# Patient Record
Sex: FEMALE | Race: WHITE | Employment: OTHER | URBAN - METROPOLITAN AREA
[De-identification: names, ages, dates, MRNs, and addresses within clinical notes are randomized per-mention and may not be internally consistent; named-entity substitution may affect disease eponyms.]

---

## 2016-08-31 LAB — PAP SMEAR, EXTERNAL: NORMAL

## 2017-01-05 ENCOUNTER — TELEPHONE (OUTPATIENT)
Dept: FAMILY MEDICINE CLINIC | Age: 52
End: 2017-01-05

## 2017-01-05 NOTE — TELEPHONE ENCOUNTER
Patient needs a refill for the Lantus 100 unit/ml injection vial. The pharmacy informed the patient that she needs to have a PA before they can fill the prescription. Patient also wants the medication sent to the [facility]. There phone number is [phone]. Please call when prescription has been approved. Her last fill was on 11- and her last OV was 11-16-17.

## 2017-01-17 ENCOUNTER — TELEPHONE (OUTPATIENT)
Dept: FAMILY MEDICINE CLINIC | Age: 52
End: 2017-01-17

## 2017-02-08 ENCOUNTER — TELEPHONE (OUTPATIENT)
Dept: FAMILY MEDICINE CLINIC | Age: 52
End: 2017-02-08

## 2017-02-13 ENCOUNTER — OFFICE VISIT (OUTPATIENT)
Dept: FAMILY MEDICINE CLINIC | Age: 52
End: 2017-02-13

## 2017-02-13 VITALS
OXYGEN SATURATION: 99 % | DIASTOLIC BLOOD PRESSURE: 60 MMHG | TEMPERATURE: 98.3 F | HEART RATE: 62 BPM | SYSTOLIC BLOOD PRESSURE: 124 MMHG | WEIGHT: 173 LBS | BODY MASS INDEX: 31.64 KG/M2

## 2017-02-13 DIAGNOSIS — J45.20 MILD INTERMITTENT ASTHMA WITHOUT COMPLICATION: ICD-10-CM

## 2017-02-13 DIAGNOSIS — E78.00 PURE HYPERCHOLESTEROLEMIA: ICD-10-CM

## 2017-02-13 DIAGNOSIS — I25.10 CORONARY ARTERY DISEASE INVOLVING NATIVE CORONARY ARTERY OF NATIVE HEART WITHOUT ANGINA PECTORIS: ICD-10-CM

## 2017-02-13 DIAGNOSIS — Z12.11 COLON CANCER SCREENING: ICD-10-CM

## 2017-02-13 DIAGNOSIS — F32.89 OTHER DEPRESSION: ICD-10-CM

## 2017-02-13 DIAGNOSIS — I10 ESSENTIAL HYPERTENSION: ICD-10-CM

## 2017-02-13 PROBLEM — E66.9 DIABETES MELLITUS TYPE 2 IN OBESE (HCC): Status: ACTIVE | Noted: 2017-01-31

## 2017-02-13 PROBLEM — E11.69 DIABETES MELLITUS TYPE 2 IN OBESE (HCC): Status: ACTIVE | Noted: 2017-01-31

## 2017-02-13 LAB
BILIRUBIN, POC: ABNORMAL
BLOOD URINE, POC: ABNORMAL
CLARITY, POC: CLEAR
COLOR, POC: YELLOW
GLUCOSE BLD-MCNC: 101 MG/DL
GLUCOSE URINE, POC: ABNORMAL
KETONES, POC: ABNORMAL
LEUKOCYTE EST, POC: ABNORMAL
NITRITE, POC: ABNORMAL
PH, POC: 7
PROTEIN, POC: ABNORMAL
SPECIFIC GRAVITY, POC: 1.02
UROBILINOGEN, POC: 1

## 2017-02-13 PROCEDURE — 81002 URINALYSIS NONAUTO W/O SCOPE: CPT | Performed by: FAMILY MEDICINE

## 2017-02-13 PROCEDURE — 99214 OFFICE O/P EST MOD 30 MIN: CPT | Performed by: FAMILY MEDICINE

## 2017-02-13 PROCEDURE — 82962 GLUCOSE BLOOD TEST: CPT | Performed by: FAMILY MEDICINE

## 2017-02-13 RX ORDER — INSULIN GLARGINE 100 [IU]/ML
24 INJECTION, SOLUTION SUBCUTANEOUS NIGHTLY
Qty: 1 VIAL | Refills: 2 | Status: CANCELLED | OUTPATIENT
Start: 2017-02-13

## 2017-02-13 RX ORDER — SYRINGE AND NEEDLE,INSULIN,1ML 31 GX5/16"
1 SYRINGE, EMPTY DISPOSABLE MISCELLANEOUS 2 TIMES DAILY
Qty: 100 EACH | Refills: 2 | Status: SHIPPED | OUTPATIENT
Start: 2017-02-13 | End: 2017-06-14 | Stop reason: SDUPTHER

## 2017-02-13 RX ORDER — FLUOXETINE HYDROCHLORIDE 20 MG/1
20 CAPSULE ORAL DAILY
Qty: 30 CAPSULE | Refills: 2 | Status: SHIPPED | OUTPATIENT
Start: 2017-02-13 | End: 2017-08-13 | Stop reason: SDUPTHER

## 2017-02-13 RX ORDER — NITROGLYCERIN 0.3 MG/1
TABLET SUBLINGUAL
COMMUNITY

## 2017-02-13 ASSESSMENT — ENCOUNTER SYMPTOMS
CHEST TIGHTNESS: 0
SHORTNESS OF BREATH: 0
BLOOD IN STOOL: 0
COUGH: 0
ABDOMINAL PAIN: 0

## 2017-03-16 ENCOUNTER — OFFICE VISIT (OUTPATIENT)
Dept: FAMILY MEDICINE CLINIC | Age: 52
End: 2017-03-16

## 2017-03-16 VITALS
DIASTOLIC BLOOD PRESSURE: 80 MMHG | HEIGHT: 62 IN | SYSTOLIC BLOOD PRESSURE: 122 MMHG | OXYGEN SATURATION: 99 % | HEART RATE: 58 BPM | WEIGHT: 176.4 LBS | RESPIRATION RATE: 13 BRPM | BODY MASS INDEX: 32.46 KG/M2

## 2017-03-16 DIAGNOSIS — I10 ESSENTIAL HYPERTENSION: ICD-10-CM

## 2017-03-16 DIAGNOSIS — Z13.9 SCREENING: ICD-10-CM

## 2017-03-16 DIAGNOSIS — M94.0 COSTOCHONDRITIS: Primary | ICD-10-CM

## 2017-03-16 DIAGNOSIS — T14.8XXA MUSCLE STRAIN: ICD-10-CM

## 2017-03-16 PROCEDURE — 99213 OFFICE O/P EST LOW 20 MIN: CPT | Performed by: CLINICAL NURSE SPECIALIST

## 2017-03-16 ASSESSMENT — ENCOUNTER SYMPTOMS
SHORTNESS OF BREATH: 0
CHEST TIGHTNESS: 0

## 2017-03-19 ASSESSMENT — ENCOUNTER SYMPTOMS
CHANGE IN BOWEL HABIT: 0
VISUAL CHANGE: 0
DIARRHEA: 0
ABDOMINAL PAIN: 0
VOMITING: 0
SORE THROAT: 0
NAUSEA: 0

## 2017-03-23 DIAGNOSIS — I10 ESSENTIAL HYPERTENSION: ICD-10-CM

## 2017-03-23 DIAGNOSIS — E78.00 PURE HYPERCHOLESTEROLEMIA: ICD-10-CM

## 2017-03-23 DIAGNOSIS — J45.20 MILD INTERMITTENT ASTHMA WITHOUT COMPLICATION: ICD-10-CM

## 2017-03-23 LAB
A/G RATIO: 1.7 (ref 1.1–2.2)
ALBUMIN SERPL-MCNC: 4.3 G/DL (ref 3.4–5)
ALP BLD-CCNC: 98 U/L (ref 40–129)
ALT SERPL-CCNC: 17 U/L (ref 10–40)
ANION GAP SERPL CALCULATED.3IONS-SCNC: 12 MMOL/L (ref 3–16)
AST SERPL-CCNC: 16 U/L (ref 15–37)
BASOPHILS ABSOLUTE: 0.1 K/UL (ref 0–0.2)
BASOPHILS RELATIVE PERCENT: 1.1 %
BILIRUB SERPL-MCNC: 0.3 MG/DL (ref 0–1)
BUN BLDV-MCNC: 14 MG/DL (ref 7–20)
CALCIUM SERPL-MCNC: 9.2 MG/DL (ref 8.3–10.6)
CHLORIDE BLD-SCNC: 101 MMOL/L (ref 99–110)
CHOLESTEROL, TOTAL: 174 MG/DL (ref 0–199)
CO2: 27 MMOL/L (ref 21–32)
CREAT SERPL-MCNC: 0.7 MG/DL (ref 0.6–1.1)
EOSINOPHILS ABSOLUTE: 0.3 K/UL (ref 0–0.6)
EOSINOPHILS RELATIVE PERCENT: 4.8 %
GFR AFRICAN AMERICAN: >60
GFR NON-AFRICAN AMERICAN: >60
GLOBULIN: 2.6 G/DL
GLUCOSE BLD-MCNC: 176 MG/DL (ref 70–99)
HCT VFR BLD CALC: 38.1 % (ref 36–48)
HDLC SERPL-MCNC: 87 MG/DL (ref 40–60)
HEMOGLOBIN: 12.6 G/DL (ref 12–16)
LDL CHOLESTEROL CALCULATED: 77 MG/DL
LYMPHOCYTES ABSOLUTE: 1.3 K/UL (ref 1–5.1)
LYMPHOCYTES RELATIVE PERCENT: 18.9 %
MCH RBC QN AUTO: 29.4 PG (ref 26–34)
MCHC RBC AUTO-ENTMCNC: 33.1 G/DL (ref 31–36)
MCV RBC AUTO: 88.9 FL (ref 80–100)
MONOCYTES ABSOLUTE: 0.5 K/UL (ref 0–1.3)
MONOCYTES RELATIVE PERCENT: 7.3 %
NEUTROPHILS ABSOLUTE: 4.6 K/UL (ref 1.7–7.7)
NEUTROPHILS RELATIVE PERCENT: 67.9 %
PDW BLD-RTO: 14.2 % (ref 12.4–15.4)
PLATELET # BLD: 298 K/UL (ref 135–450)
PMV BLD AUTO: 8.4 FL (ref 5–10.5)
POTASSIUM SERPL-SCNC: 4.6 MMOL/L (ref 3.5–5.1)
RBC # BLD: 4.29 M/UL (ref 4–5.2)
SODIUM BLD-SCNC: 140 MMOL/L (ref 136–145)
TOTAL CK: 50 U/L (ref 26–192)
TOTAL PROTEIN: 6.9 G/DL (ref 6.4–8.2)
TRIGL SERPL-MCNC: 52 MG/DL (ref 0–150)
VLDLC SERPL CALC-MCNC: 10 MG/DL
WBC # BLD: 6.7 K/UL (ref 4–11)

## 2017-03-24 LAB
ESTIMATED AVERAGE GLUCOSE: 205.9 MG/DL
HBA1C MFR BLD: 8.8 %

## 2017-03-29 RX ORDER — INSULIN GLARGINE 100 [IU]/ML
INJECTION, SOLUTION SUBCUTANEOUS
Qty: 5 PEN | Refills: 0 | Status: SHIPPED | OUTPATIENT
Start: 2017-03-29 | End: 2017-06-14 | Stop reason: SDUPTHER

## 2017-06-14 ENCOUNTER — OFFICE VISIT (OUTPATIENT)
Dept: FAMILY MEDICINE CLINIC | Age: 52
End: 2017-06-14

## 2017-06-14 VITALS
WEIGHT: 174.4 LBS | OXYGEN SATURATION: 99 % | DIASTOLIC BLOOD PRESSURE: 68 MMHG | HEART RATE: 62 BPM | TEMPERATURE: 98.1 F | BODY MASS INDEX: 31.9 KG/M2 | SYSTOLIC BLOOD PRESSURE: 126 MMHG

## 2017-06-14 DIAGNOSIS — E78.00 PURE HYPERCHOLESTEROLEMIA: ICD-10-CM

## 2017-06-14 DIAGNOSIS — F32.89 OTHER DEPRESSION: ICD-10-CM

## 2017-06-14 DIAGNOSIS — I25.10 CORONARY ARTERY DISEASE INVOLVING NATIVE CORONARY ARTERY OF NATIVE HEART WITHOUT ANGINA PECTORIS: ICD-10-CM

## 2017-06-14 DIAGNOSIS — I10 ESSENTIAL HYPERTENSION: ICD-10-CM

## 2017-06-14 DIAGNOSIS — E10.69 TYPE 1 DIABETES MELLITUS WITH OTHER SPECIFIED COMPLICATION (HCC): Primary | ICD-10-CM

## 2017-06-14 LAB
BILIRUBIN, POC: NEGATIVE
BLOOD URINE, POC: NEGATIVE
CLARITY, POC: ABNORMAL
COLOR, POC: YELLOW
GLUCOSE BLD-MCNC: 124 MG/DL
GLUCOSE URINE, POC: NEGATIVE
KETONES, POC: NEGATIVE
LEUKOCYTE EST, POC: ABNORMAL
NITRITE, POC: NEGATIVE
PH, POC: 6.5
PROTEIN, POC: 30
SPECIFIC GRAVITY, POC: 1.02
UROBILINOGEN, POC: 0.2

## 2017-06-14 PROCEDURE — 99214 OFFICE O/P EST MOD 30 MIN: CPT | Performed by: FAMILY MEDICINE

## 2017-06-14 PROCEDURE — 81002 URINALYSIS NONAUTO W/O SCOPE: CPT | Performed by: FAMILY MEDICINE

## 2017-06-14 PROCEDURE — 82962 GLUCOSE BLOOD TEST: CPT | Performed by: FAMILY MEDICINE

## 2017-06-14 RX ORDER — FLUOXETINE HYDROCHLORIDE 20 MG/1
20 CAPSULE ORAL DAILY
Qty: 30 CAPSULE | Refills: 2 | Status: CANCELLED | OUTPATIENT
Start: 2017-06-14

## 2017-06-14 RX ORDER — SYRINGE AND NEEDLE,INSULIN,1ML 31 GX5/16"
1 SYRINGE, EMPTY DISPOSABLE MISCELLANEOUS 2 TIMES DAILY
Qty: 100 EACH | Refills: 2 | Status: SHIPPED | OUTPATIENT
Start: 2017-06-14 | End: 2017-12-18 | Stop reason: SDUPTHER

## 2017-06-15 LAB
A/G RATIO: 1.6 (ref 1.1–2.2)
ALBUMIN SERPL-MCNC: 4.4 G/DL (ref 3.4–5)
ALP BLD-CCNC: 99 U/L (ref 40–129)
ALT SERPL-CCNC: 18 U/L (ref 10–40)
ANION GAP SERPL CALCULATED.3IONS-SCNC: 15 MMOL/L (ref 3–16)
AST SERPL-CCNC: 18 U/L (ref 15–37)
BASOPHILS ABSOLUTE: 0.1 K/UL (ref 0–0.2)
BASOPHILS RELATIVE PERCENT: 1 %
BILIRUB SERPL-MCNC: 0.6 MG/DL (ref 0–1)
BUN BLDV-MCNC: 10 MG/DL (ref 7–20)
CALCIUM SERPL-MCNC: 9.5 MG/DL (ref 8.3–10.6)
CHLORIDE BLD-SCNC: 100 MMOL/L (ref 99–110)
CHOLESTEROL, TOTAL: 169 MG/DL (ref 0–199)
CO2: 26 MMOL/L (ref 21–32)
CREAT SERPL-MCNC: 0.6 MG/DL (ref 0.6–1.1)
EOSINOPHILS ABSOLUTE: 0.3 K/UL (ref 0–0.6)
EOSINOPHILS RELATIVE PERCENT: 4.2 %
ESTIMATED AVERAGE GLUCOSE: 191.5 MG/DL
GFR AFRICAN AMERICAN: >60
GFR NON-AFRICAN AMERICAN: >60
GLOBULIN: 2.8 G/DL
GLUCOSE BLD-MCNC: 105 MG/DL (ref 70–99)
HBA1C MFR BLD: 8.3 %
HCT VFR BLD CALC: 39.1 % (ref 36–48)
HDLC SERPL-MCNC: 97 MG/DL (ref 40–60)
HEMOGLOBIN: 12.7 G/DL (ref 12–16)
HEPATITIS C ANTIBODY INTERPRETATION: NORMAL
LDL CHOLESTEROL CALCULATED: 59 MG/DL
LYMPHOCYTES ABSOLUTE: 1.5 K/UL (ref 1–5.1)
LYMPHOCYTES RELATIVE PERCENT: 22.4 %
MCH RBC QN AUTO: 30.2 PG (ref 26–34)
MCHC RBC AUTO-ENTMCNC: 32.4 G/DL (ref 31–36)
MCV RBC AUTO: 93.2 FL (ref 80–100)
MONOCYTES ABSOLUTE: 0.5 K/UL (ref 0–1.3)
MONOCYTES RELATIVE PERCENT: 7.8 %
NEUTROPHILS ABSOLUTE: 4.4 K/UL (ref 1.7–7.7)
NEUTROPHILS RELATIVE PERCENT: 64.6 %
PDW BLD-RTO: 13.4 % (ref 12.4–15.4)
PLATELET # BLD: 270 K/UL (ref 135–450)
PMV BLD AUTO: 8.5 FL (ref 5–10.5)
POTASSIUM SERPL-SCNC: 4.6 MMOL/L (ref 3.5–5.1)
RBC # BLD: 4.2 M/UL (ref 4–5.2)
SODIUM BLD-SCNC: 141 MMOL/L (ref 136–145)
TOTAL CK: 50 U/L (ref 26–192)
TOTAL PROTEIN: 7.2 G/DL (ref 6.4–8.2)
TRIGL SERPL-MCNC: 64 MG/DL (ref 0–150)
VLDLC SERPL CALC-MCNC: 13 MG/DL
WBC # BLD: 6.9 K/UL (ref 4–11)

## 2017-06-16 DIAGNOSIS — R80.9 PROTEIN IN URINE: Primary | ICD-10-CM

## 2017-06-20 ASSESSMENT — ENCOUNTER SYMPTOMS
SHORTNESS OF BREATH: 0
CHEST TIGHTNESS: 0
COUGH: 0
BLOOD IN STOOL: 0
ABDOMINAL PAIN: 0

## 2017-06-21 ENCOUNTER — TELEPHONE (OUTPATIENT)
Dept: FAMILY MEDICINE CLINIC | Age: 52
End: 2017-06-21

## 2017-06-23 DIAGNOSIS — R80.9 PROTEIN IN URINE: ICD-10-CM

## 2017-06-23 LAB
24HR URINE VOLUME (ML): 900 ML
MICROALBUMIN 24H UR-MCNC: 117 MG/DAY (ref 0–30)
MICROALBUMIN UR-MCNC: 81.3 MCG/MIN (ref 0–20)

## 2017-06-26 ENCOUNTER — NURSE ONLY (OUTPATIENT)
Dept: FAMILY MEDICINE CLINIC | Age: 52
End: 2017-06-26

## 2017-06-26 ENCOUNTER — TELEPHONE (OUTPATIENT)
Dept: FAMILY MEDICINE CLINIC | Age: 52
End: 2017-06-26

## 2017-06-26 DIAGNOSIS — Z12.11 COLON CANCER SCREENING: ICD-10-CM

## 2017-06-26 LAB
CONTROL: POSITIVE
HEMOCCULT STL QL: NEGATIVE

## 2017-06-26 PROCEDURE — 82274 ASSAY TEST FOR BLOOD FECAL: CPT | Performed by: FAMILY MEDICINE

## 2017-06-27 ENCOUNTER — TELEPHONE (OUTPATIENT)
Dept: FAMILY MEDICINE CLINIC | Age: 52
End: 2017-06-27

## 2017-06-27 ENCOUNTER — TELEPHONE (OUTPATIENT)
Dept: SLEEP MEDICINE | Age: 52
End: 2017-06-27

## 2017-06-29 DIAGNOSIS — R80.9 PROTEINURIA: Primary | ICD-10-CM

## 2017-06-29 DIAGNOSIS — R80.9 PROTEINURIA: ICD-10-CM

## 2017-06-30 LAB
24HR URINE VOLUME (ML): 1050 ML
MICROALBUMIN 24H UR-MCNC: 112.4 MG/DAY (ref 0–30)
MICROALBUMIN UR-MCNC: 78 MCG/MIN (ref 0–20)

## 2017-07-12 ENCOUNTER — OFFICE VISIT (OUTPATIENT)
Dept: FAMILY MEDICINE CLINIC | Age: 52
End: 2017-07-12

## 2017-07-12 VITALS
HEART RATE: 86 BPM | OXYGEN SATURATION: 98 % | SYSTOLIC BLOOD PRESSURE: 138 MMHG | DIASTOLIC BLOOD PRESSURE: 76 MMHG | TEMPERATURE: 98.9 F | WEIGHT: 174 LBS | BODY MASS INDEX: 31.83 KG/M2

## 2017-07-12 DIAGNOSIS — K59.00 CONSTIPATION, UNSPECIFIED CONSTIPATION TYPE: ICD-10-CM

## 2017-07-12 DIAGNOSIS — F32.A DEPRESSION, UNSPECIFIED DEPRESSION TYPE: ICD-10-CM

## 2017-07-12 DIAGNOSIS — E10.69 TYPE 1 DIABETES MELLITUS WITH OTHER SPECIFIED COMPLICATION (HCC): Primary | ICD-10-CM

## 2017-07-12 DIAGNOSIS — K58.9 IRRITABLE BOWEL SYNDROME, UNSPECIFIED TYPE: ICD-10-CM

## 2017-07-12 DIAGNOSIS — I10 ESSENTIAL HYPERTENSION: ICD-10-CM

## 2017-07-12 DIAGNOSIS — R10.11 RIGHT UPPER QUADRANT ABDOMINAL PAIN: ICD-10-CM

## 2017-07-12 DIAGNOSIS — R80.9 PROTEINURIA: ICD-10-CM

## 2017-07-12 LAB — GLUCOSE BLD-MCNC: 219 MG/DL

## 2017-07-12 PROCEDURE — 82962 GLUCOSE BLOOD TEST: CPT | Performed by: FAMILY MEDICINE

## 2017-07-12 PROCEDURE — 99214 OFFICE O/P EST MOD 30 MIN: CPT | Performed by: FAMILY MEDICINE

## 2017-07-12 RX ORDER — FLUOXETINE HYDROCHLORIDE 20 MG/1
20 CAPSULE ORAL DAILY
Qty: 30 CAPSULE | Refills: 0 | Status: SHIPPED | OUTPATIENT
Start: 2017-07-12 | End: 2017-09-15 | Stop reason: SDUPTHER

## 2017-07-14 RX ORDER — POLYETHYLENE GLYCOL 3350 17 G/17G
17 POWDER, FOR SOLUTION ORAL DAILY PRN
Qty: 527 G | Refills: 1 | Status: SHIPPED | OUTPATIENT
Start: 2017-07-14 | End: 2017-07-24

## 2017-07-16 ASSESSMENT — ENCOUNTER SYMPTOMS
BLOOD IN STOOL: 0
ABDOMINAL PAIN: 0
CHEST TIGHTNESS: 0
NAUSEA: 0
DIARRHEA: 0
CONSTIPATION: 1
SHORTNESS OF BREATH: 0
COUGH: 0
VOMITING: 0

## 2017-07-18 ENCOUNTER — HOSPITAL ENCOUNTER (OUTPATIENT)
Dept: ULTRASOUND IMAGING | Age: 52
Discharge: HOME | End: 2017-07-18
Attending: FAMILY MEDICINE | Admitting: FAMILY MEDICINE

## 2017-07-18 DIAGNOSIS — R10.11 RIGHT UPPER QUADRANT ABDOMINAL PAIN: ICD-10-CM

## 2017-07-18 DIAGNOSIS — R10.11 RIGHT UPPER QUADRANT PAIN: ICD-10-CM

## 2017-07-24 ENCOUNTER — TELEPHONE (OUTPATIENT)
Dept: FAMILY MEDICINE CLINIC | Age: 52
End: 2017-07-24

## 2017-07-24 ENCOUNTER — OFFICE VISIT (OUTPATIENT)
Dept: FAMILY MEDICINE CLINIC | Age: 52
End: 2017-07-24

## 2017-07-24 VITALS
SYSTOLIC BLOOD PRESSURE: 124 MMHG | BODY MASS INDEX: 31.9 KG/M2 | HEART RATE: 64 BPM | TEMPERATURE: 98.3 F | DIASTOLIC BLOOD PRESSURE: 70 MMHG | OXYGEN SATURATION: 99 % | WEIGHT: 174.4 LBS

## 2017-07-24 DIAGNOSIS — R42 VERTIGO, CONSTANT: ICD-10-CM

## 2017-07-24 DIAGNOSIS — K82.4 GALLBLADDER POLYP: ICD-10-CM

## 2017-07-24 DIAGNOSIS — K59.00 CONSTIPATION, UNSPECIFIED CONSTIPATION TYPE: Primary | ICD-10-CM
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
## 2017-07-24 DIAGNOSIS — R42 VERTIGO: ICD-10-CM

## 2017-07-24 DIAGNOSIS — F32.A DEPRESSION, UNSPECIFIED DEPRESSION TYPE: ICD-10-CM

## 2017-07-24 DIAGNOSIS — S06.0X0A CONCUSSION, WITHOUT LOSS OF CONSCIOUSNESS, INITIAL ENCOUNTER: ICD-10-CM

## 2017-07-24 DIAGNOSIS — S06.0X0A CONCUSSION, WITHOUT LOSS OF CONSCIOUSNESS, INITIAL ENCOUNTER: Primary | ICD-10-CM

## 2017-07-24 PROCEDURE — 99215 OFFICE O/P EST HI 40 MIN: CPT | Performed by: FAMILY MEDICINE

## 2017-07-24 RX ORDER — POLYETHYLENE GLYCOL 3350 17 G/17G
POWDER, FOR SOLUTION ORAL
Refills: 1 | COMMUNITY
Start: 2017-07-14 | End: 2021-11-16

## 2017-07-24 RX ORDER — MECLIZINE HYDROCHLORIDE 25 MG/1
25 TABLET ORAL 2 TIMES DAILY PRN
Qty: 12 TABLET | Refills: 0 | Status: SHIPPED | OUTPATIENT
Start: 2017-07-24 | End: 2017-08-03

## 2017-07-24 ASSESSMENT — ENCOUNTER SYMPTOMS
VOMITING: 0
ABDOMINAL PAIN: 0
COUGH: 0
NAUSEA: 0
CONSTIPATION: 0
BLOOD IN STOOL: 0
CHEST TIGHTNESS: 0
SHORTNESS OF BREATH: 0

## 2017-07-24 ASSESSMENT — PATIENT HEALTH QUESTIONNAIRE - PHQ9
SUM OF ALL RESPONSES TO PHQ9 QUESTIONS 1 & 2: 0
2. FEELING DOWN, DEPRESSED OR HOPELESS: 0
1. LITTLE INTEREST OR PLEASURE IN DOING THINGS: 0
SUM OF ALL RESPONSES TO PHQ QUESTIONS 1-9: 0

## 2017-07-26 ENCOUNTER — TELEPHONE (OUTPATIENT)
Dept: FAMILY MEDICINE CLINIC | Age: 52
End: 2017-07-26

## 2017-07-27 ENCOUNTER — TELEPHONE (OUTPATIENT)
Dept: FAMILY MEDICINE CLINIC | Age: 52
End: 2017-07-27

## 2017-07-28 RX ORDER — PEN NEEDLE, DIABETIC 30 GX3/16"
NEEDLE, DISPOSABLE MISCELLANEOUS
Qty: 100 EACH | Refills: 0 | Status: SHIPPED | OUTPATIENT
Start: 2017-07-28 | End: 2017-10-25 | Stop reason: SDUPTHER

## 2017-08-14 RX ORDER — FLUOXETINE HYDROCHLORIDE 20 MG/1
CAPSULE ORAL
Qty: 30 CAPSULE | Refills: 0 | Status: SHIPPED | OUTPATIENT
Start: 2017-08-14 | End: 2017-09-15

## 2017-08-25 ENCOUNTER — OFFICE VISIT (OUTPATIENT)
Dept: FAMILY MEDICINE CLINIC | Age: 52
End: 2017-08-25

## 2017-08-25 VITALS
OXYGEN SATURATION: 98 % | SYSTOLIC BLOOD PRESSURE: 128 MMHG | DIASTOLIC BLOOD PRESSURE: 80 MMHG | TEMPERATURE: 98.1 F | BODY MASS INDEX: 31.97 KG/M2 | HEART RATE: 68 BPM | WEIGHT: 174.8 LBS

## 2017-08-25 DIAGNOSIS — E66.9 DIABETES MELLITUS TYPE 2 IN OBESE (HCC): ICD-10-CM

## 2017-08-25 DIAGNOSIS — S20.212A CHEST WALL CONTUSION, LEFT, INITIAL ENCOUNTER: ICD-10-CM

## 2017-08-25 DIAGNOSIS — R82.2 BILIRUBIN IN URINE: ICD-10-CM

## 2017-08-25 DIAGNOSIS — S22.42XA CLOSED FRACTURE OF MULTIPLE RIBS OF LEFT SIDE, INITIAL ENCOUNTER: Primary | ICD-10-CM

## 2017-08-25 DIAGNOSIS — E11.69 DIABETES MELLITUS TYPE 2 IN OBESE (HCC): ICD-10-CM

## 2017-08-25 LAB
BACTERIA: ABNORMAL /HPF
BILIRUBIN URINE: ABNORMAL
BILIRUBIN, POC: NORMAL
BLOOD URINE, POC: NEGATIVE
BLOOD, URINE: NEGATIVE
CLARITY, POC: CLEAR
CLARITY: ABNORMAL
COLOR, POC: YELLOW
COLOR: YELLOW
EPITHELIAL CELLS, UA: 13 /HPF (ref 0–5)
GLUCOSE BLD-MCNC: 288 MG/DL
GLUCOSE URINE, POC: 1000
GLUCOSE URINE: >=1000 MG/DL
HYALINE CASTS: 4 /LPF (ref 0–8)
KETONES, POC: NEGATIVE
KETONES, URINE: NEGATIVE MG/DL
LEUKOCYTE EST, POC: NEGATIVE
LEUKOCYTE ESTERASE, URINE: NEGATIVE
MICROSCOPIC EXAMINATION: YES
NITRITE, POC: NEGATIVE
NITRITE, URINE: NEGATIVE
PH UA: 5.5
PH, POC: 5.5
PROTEIN UA: ABNORMAL MG/DL
PROTEIN, POC: NEGATIVE
RBC UA: 2 /HPF (ref 0–4)
SPECIFIC GRAVITY UA: >1.03
SPECIFIC GRAVITY, POC: 1.02
URINE TYPE: ABNORMAL
UROBILINOGEN, POC: 0.2
UROBILINOGEN, URINE: 0.2 E.U./DL
WBC UA: 3 /HPF (ref 0–5)

## 2017-08-25 PROCEDURE — 81003 URINALYSIS AUTO W/O SCOPE: CPT | Performed by: FAMILY MEDICINE

## 2017-08-25 PROCEDURE — 82962 GLUCOSE BLOOD TEST: CPT | Performed by: FAMILY MEDICINE

## 2017-08-25 PROCEDURE — 81002 URINALYSIS NONAUTO W/O SCOPE: CPT | Performed by: FAMILY MEDICINE

## 2017-08-25 PROCEDURE — 99213 OFFICE O/P EST LOW 20 MIN: CPT | Performed by: FAMILY MEDICINE

## 2017-08-25 RX ORDER — HYDROCODONE BITARTRATE AND ACETAMINOPHEN 7.5; 325 MG/1; MG/1
TABLET ORAL
Refills: 0 | COMMUNITY
Start: 2017-08-14 | End: 2018-07-12

## 2017-08-25 RX ORDER — BACLOFEN 10 MG/1
TABLET ORAL
Refills: 0 | COMMUNITY
Start: 2017-08-14 | End: 2021-11-16 | Stop reason: ALTCHOICE

## 2017-08-25 RX ORDER — MELOXICAM 15 MG/1
TABLET ORAL
COMMUNITY
Start: 2017-08-21 | End: 2018-07-12

## 2017-08-28 ENCOUNTER — TELEPHONE (OUTPATIENT)
Dept: FAMILY MEDICINE CLINIC | Age: 52
End: 2017-08-28

## 2017-08-28 DIAGNOSIS — R82.2 BILIRUBIN IN URINE: Primary | ICD-10-CM

## 2017-08-29 DIAGNOSIS — R82.2 BILIRUBIN IN URINE: ICD-10-CM

## 2017-08-29 LAB
A/G RATIO: 1.4 (ref 1.1–2.2)
ALBUMIN SERPL-MCNC: 3.9 G/DL (ref 3.4–5)
ALP BLD-CCNC: 96 U/L (ref 40–129)
ALT SERPL-CCNC: 11 U/L (ref 10–40)
ANION GAP SERPL CALCULATED.3IONS-SCNC: 13 MMOL/L (ref 3–16)
AST SERPL-CCNC: 14 U/L (ref 15–37)
BILIRUB SERPL-MCNC: 0.5 MG/DL (ref 0–1)
BUN BLDV-MCNC: 12 MG/DL (ref 7–20)
CALCIUM SERPL-MCNC: 9.3 MG/DL (ref 8.3–10.6)
CHLORIDE BLD-SCNC: 99 MMOL/L (ref 99–110)
CO2: 25 MMOL/L (ref 21–32)
CREAT SERPL-MCNC: 0.7 MG/DL (ref 0.6–1.1)
GFR AFRICAN AMERICAN: >60
GFR NON-AFRICAN AMERICAN: >60
GLOBULIN: 2.8 G/DL
GLUCOSE BLD-MCNC: 176 MG/DL (ref 70–99)
POTASSIUM SERPL-SCNC: 4.9 MMOL/L (ref 3.5–5.1)
SODIUM BLD-SCNC: 137 MMOL/L (ref 136–145)
TOTAL PROTEIN: 6.7 G/DL (ref 6.4–8.2)

## 2017-08-29 ASSESSMENT — ENCOUNTER SYMPTOMS
CHEST TIGHTNESS: 0
SINUS PRESSURE: 0
COUGH: 0
VOMITING: 0
NAUSEA: 0
CONSTIPATION: 0
ABDOMINAL PAIN: 0
DIARRHEA: 0
BLOOD IN STOOL: 0
SHORTNESS OF BREATH: 0

## 2017-09-15 ENCOUNTER — OFFICE VISIT (OUTPATIENT)
Dept: FAMILY MEDICINE CLINIC | Age: 52
End: 2017-09-15

## 2017-09-15 VITALS
DIASTOLIC BLOOD PRESSURE: 60 MMHG | HEART RATE: 64 BPM | BODY MASS INDEX: 30.87 KG/M2 | WEIGHT: 168.8 LBS | TEMPERATURE: 98.5 F | OXYGEN SATURATION: 98 % | SYSTOLIC BLOOD PRESSURE: 128 MMHG

## 2017-09-15 DIAGNOSIS — F32.A DEPRESSION, UNSPECIFIED DEPRESSION TYPE: ICD-10-CM

## 2017-09-15 DIAGNOSIS — E78.00 PURE HYPERCHOLESTEROLEMIA: ICD-10-CM

## 2017-09-15 DIAGNOSIS — I25.10 CORONARY ARTERY DISEASE INVOLVING NATIVE CORONARY ARTERY OF NATIVE HEART WITHOUT ANGINA PECTORIS: ICD-10-CM

## 2017-09-15 DIAGNOSIS — I10 ESSENTIAL HYPERTENSION: ICD-10-CM

## 2017-09-15 DIAGNOSIS — J45.20 MILD INTERMITTENT ASTHMA WITHOUT COMPLICATION: ICD-10-CM

## 2017-09-15 DIAGNOSIS — Z23 NEED FOR INFLUENZA VACCINATION: ICD-10-CM

## 2017-09-15 LAB
BILIRUBIN, POC: NORMAL
BLOOD URINE, POC: NORMAL
CLARITY, POC: CLEAR
COLOR, POC: YELLOW
GLUCOSE BLD-MCNC: 183 MG/DL
GLUCOSE URINE, POC: NORMAL
KETONES, POC: NORMAL
LEUKOCYTE EST, POC: NORMAL
NITRITE, POC: NORMAL
PH, POC: 6
PROTEIN, POC: NORMAL
SPECIFIC GRAVITY, POC: 1.01
UROBILINOGEN, POC: 0.2

## 2017-09-15 PROCEDURE — 90686 IIV4 VACC NO PRSV 0.5 ML IM: CPT | Performed by: FAMILY MEDICINE

## 2017-09-15 PROCEDURE — 81002 URINALYSIS NONAUTO W/O SCOPE: CPT | Performed by: FAMILY MEDICINE

## 2017-09-15 PROCEDURE — 90471 IMMUNIZATION ADMIN: CPT | Performed by: FAMILY MEDICINE

## 2017-09-15 PROCEDURE — 82962 GLUCOSE BLOOD TEST: CPT | Performed by: FAMILY MEDICINE

## 2017-09-15 PROCEDURE — 99214 OFFICE O/P EST MOD 30 MIN: CPT | Performed by: FAMILY MEDICINE

## 2017-09-15 RX ORDER — SYRINGE AND NEEDLE,INSULIN,1ML 31 GX5/16"
1 SYRINGE, EMPTY DISPOSABLE MISCELLANEOUS 2 TIMES DAILY
Qty: 100 EACH | Refills: 2 | Status: CANCELLED | OUTPATIENT
Start: 2017-09-15

## 2017-09-15 RX ORDER — FLUOXETINE HYDROCHLORIDE 20 MG/1
20 CAPSULE ORAL DAILY
Qty: 30 CAPSULE | Refills: 2 | Status: SHIPPED | OUTPATIENT
Start: 2017-09-15 | End: 2017-12-13 | Stop reason: SDUPTHER

## 2017-09-15 RX ORDER — BLOOD SUGAR DIAGNOSTIC
STRIP MISCELLANEOUS
Qty: 150 EACH | Refills: 1 | Status: CANCELLED | OUTPATIENT
Start: 2017-09-15

## 2017-09-15 RX ORDER — PEN NEEDLE, DIABETIC 30 GX3/16"
NEEDLE, DISPOSABLE MISCELLANEOUS
Qty: 100 EACH | Refills: 0 | Status: CANCELLED | OUTPATIENT
Start: 2017-09-15

## 2017-09-15 RX ORDER — LANCETS 28 GAUGE
1 EACH MISCELLANEOUS 2 TIMES DAILY
Qty: 100 EACH | Refills: 2 | Status: CANCELLED | OUTPATIENT
Start: 2017-09-15

## 2017-09-15 ASSESSMENT — PATIENT HEALTH QUESTIONNAIRE - PHQ9
SUM OF ALL RESPONSES TO PHQ QUESTIONS 1-9: 0
SUM OF ALL RESPONSES TO PHQ9 QUESTIONS 1 & 2: 0
2. FEELING DOWN, DEPRESSED OR HOPELESS: 0
1. LITTLE INTEREST OR PLEASURE IN DOING THINGS: 0

## 2017-09-24 ASSESSMENT — ENCOUNTER SYMPTOMS
COUGH: 0
CHEST TIGHTNESS: 0
BLOOD IN STOOL: 0
ABDOMINAL PAIN: 0
SHORTNESS OF BREATH: 0

## 2017-10-11 RX ORDER — INSULIN GLARGINE 100 [IU]/ML
INJECTION, SOLUTION SUBCUTANEOUS
Qty: 5 PEN | Refills: 0 | Status: SHIPPED | OUTPATIENT
Start: 2017-10-11 | End: 2017-11-07 | Stop reason: SDUPTHER

## 2017-10-11 NOTE — TELEPHONE ENCOUNTER
Felicia Medel last filled 9/15/17 5 pens/o refills  Last OV 9/15/17  Next OV for 3 month med check 12/8/17  Please advise

## 2017-10-27 RX ORDER — PEN NEEDLE, DIABETIC 30 GX3/16"
NEEDLE, DISPOSABLE MISCELLANEOUS
Qty: 100 EACH | Refills: 0 | Status: SHIPPED | OUTPATIENT
Start: 2017-10-27 | End: 2017-12-18 | Stop reason: SDUPTHER

## 2017-12-13 RX ORDER — FLUOXETINE HYDROCHLORIDE 20 MG/1
CAPSULE ORAL
Qty: 14 CAPSULE | Refills: 0 | Status: SHIPPED | OUTPATIENT
Start: 2017-12-13 | End: 2017-12-18 | Stop reason: SDUPTHER

## 2017-12-18 ENCOUNTER — OFFICE VISIT (OUTPATIENT)
Dept: FAMILY MEDICINE CLINIC | Age: 52
End: 2017-12-18

## 2017-12-18 VITALS
DIASTOLIC BLOOD PRESSURE: 68 MMHG | WEIGHT: 171.4 LBS | OXYGEN SATURATION: 98 % | SYSTOLIC BLOOD PRESSURE: 114 MMHG | TEMPERATURE: 98.3 F | BODY MASS INDEX: 31.35 KG/M2 | HEART RATE: 98 BPM

## 2017-12-18 DIAGNOSIS — E10.69 TYPE 1 DIABETES MELLITUS WITH OTHER SPECIFIED COMPLICATION (HCC): Primary | ICD-10-CM

## 2017-12-18 DIAGNOSIS — I10 ESSENTIAL HYPERTENSION: ICD-10-CM

## 2017-12-18 DIAGNOSIS — E78.00 PURE HYPERCHOLESTEROLEMIA: ICD-10-CM

## 2017-12-18 DIAGNOSIS — J45.30 MILD PERSISTENT ASTHMA WITHOUT COMPLICATION: ICD-10-CM

## 2017-12-18 DIAGNOSIS — I25.10 CORONARY ARTERY DISEASE INVOLVING NATIVE CORONARY ARTERY OF NATIVE HEART WITHOUT ANGINA PECTORIS: ICD-10-CM

## 2017-12-18 LAB — GLUCOSE BLD-MCNC: 195 MG/DL

## 2017-12-18 PROCEDURE — G8484 FLU IMMUNIZE NO ADMIN: HCPCS | Performed by: FAMILY MEDICINE

## 2017-12-18 PROCEDURE — 3014F SCREEN MAMMO DOC REV: CPT | Performed by: FAMILY MEDICINE

## 2017-12-18 PROCEDURE — G8598 ASA/ANTIPLAT THER USED: HCPCS | Performed by: FAMILY MEDICINE

## 2017-12-18 PROCEDURE — 3017F COLORECTAL CA SCREEN DOC REV: CPT | Performed by: FAMILY MEDICINE

## 2017-12-18 PROCEDURE — 3045F PR MOST RECENT HEMOGLOBIN A1C LEVEL 7.0-9.0%: CPT | Performed by: FAMILY MEDICINE

## 2017-12-18 PROCEDURE — G8417 CALC BMI ABV UP PARAM F/U: HCPCS | Performed by: FAMILY MEDICINE

## 2017-12-18 PROCEDURE — 82962 GLUCOSE BLOOD TEST: CPT | Performed by: FAMILY MEDICINE

## 2017-12-18 PROCEDURE — 1036F TOBACCO NON-USER: CPT | Performed by: FAMILY MEDICINE

## 2017-12-18 PROCEDURE — 99214 OFFICE O/P EST MOD 30 MIN: CPT | Performed by: FAMILY MEDICINE

## 2017-12-18 PROCEDURE — G8427 DOCREV CUR MEDS BY ELIG CLIN: HCPCS | Performed by: FAMILY MEDICINE

## 2017-12-18 RX ORDER — SYRINGE AND NEEDLE,INSULIN,1ML 31 GX5/16"
1 SYRINGE, EMPTY DISPOSABLE MISCELLANEOUS 2 TIMES DAILY
Qty: 100 EACH | Refills: 2 | Status: SHIPPED | OUTPATIENT
Start: 2017-12-18 | End: 2018-03-27 | Stop reason: SDUPTHER

## 2017-12-18 RX ORDER — PEN NEEDLE, DIABETIC 30 GX3/16"
1 NEEDLE, DISPOSABLE MISCELLANEOUS 4 TIMES DAILY
Qty: 100 EACH | Refills: 0 | Status: SHIPPED | OUTPATIENT
Start: 2017-12-18 | End: 2018-03-07 | Stop reason: SDUPTHER

## 2017-12-18 RX ORDER — FLUOXETINE HYDROCHLORIDE 20 MG/1
20 CAPSULE ORAL DAILY
Qty: 30 CAPSULE | Refills: 2 | Status: SHIPPED | OUTPATIENT
Start: 2017-12-18 | End: 2018-03-27 | Stop reason: SDUPTHER

## 2017-12-18 RX ORDER — BLOOD SUGAR DIAGNOSTIC
1 STRIP MISCELLANEOUS 3 TIMES DAILY
Qty: 150 EACH | Refills: 1 | Status: SHIPPED | OUTPATIENT
Start: 2017-12-18 | End: 2018-03-27 | Stop reason: SDUPTHER

## 2017-12-22 DIAGNOSIS — E10.69 TYPE 1 DIABETES MELLITUS WITH OTHER SPECIFIED COMPLICATION (HCC): ICD-10-CM

## 2017-12-22 DIAGNOSIS — E78.00 PURE HYPERCHOLESTEROLEMIA: ICD-10-CM

## 2017-12-22 LAB
A/G RATIO: 1.7 (ref 1.1–2.2)
ALBUMIN SERPL-MCNC: 4.3 G/DL (ref 3.4–5)
ALP BLD-CCNC: 96 U/L (ref 40–129)
ALT SERPL-CCNC: 13 U/L (ref 10–40)
ANION GAP SERPL CALCULATED.3IONS-SCNC: 12 MMOL/L (ref 3–16)
AST SERPL-CCNC: 15 U/L (ref 15–37)
BASOPHILS ABSOLUTE: 0.1 K/UL (ref 0–0.2)
BASOPHILS RELATIVE PERCENT: 1.1 %
BILIRUB SERPL-MCNC: 0.4 MG/DL (ref 0–1)
BUN BLDV-MCNC: 19 MG/DL (ref 7–20)
CALCIUM SERPL-MCNC: 9.3 MG/DL (ref 8.3–10.6)
CHLORIDE BLD-SCNC: 101 MMOL/L (ref 99–110)
CHOLESTEROL, TOTAL: 168 MG/DL (ref 0–199)
CO2: 29 MMOL/L (ref 21–32)
CREAT SERPL-MCNC: 0.8 MG/DL (ref 0.6–1.1)
EOSINOPHILS ABSOLUTE: 0.3 K/UL (ref 0–0.6)
EOSINOPHILS RELATIVE PERCENT: 4 %
GFR AFRICAN AMERICAN: >60
GFR NON-AFRICAN AMERICAN: >60
GLOBULIN: 2.5 G/DL
GLUCOSE BLD-MCNC: 216 MG/DL (ref 70–99)
HCT VFR BLD CALC: 37.9 % (ref 36–48)
HDLC SERPL-MCNC: 80 MG/DL (ref 40–60)
HEMOGLOBIN: 12.6 G/DL (ref 12–16)
LDL CHOLESTEROL CALCULATED: 77 MG/DL
LYMPHOCYTES ABSOLUTE: 1.5 K/UL (ref 1–5.1)
LYMPHOCYTES RELATIVE PERCENT: 22.6 %
MCH RBC QN AUTO: 30.9 PG (ref 26–34)
MCHC RBC AUTO-ENTMCNC: 33.2 G/DL (ref 31–36)
MCV RBC AUTO: 93.1 FL (ref 80–100)
MONOCYTES ABSOLUTE: 0.5 K/UL (ref 0–1.3)
MONOCYTES RELATIVE PERCENT: 7.2 %
NEUTROPHILS ABSOLUTE: 4.4 K/UL (ref 1.7–7.7)
NEUTROPHILS RELATIVE PERCENT: 65.1 %
PDW BLD-RTO: 13.4 % (ref 12.4–15.4)
PLATELET # BLD: 329 K/UL (ref 135–450)
PMV BLD AUTO: 8.9 FL (ref 5–10.5)
POTASSIUM SERPL-SCNC: 4.9 MMOL/L (ref 3.5–5.1)
RBC # BLD: 4.07 M/UL (ref 4–5.2)
SODIUM BLD-SCNC: 142 MMOL/L (ref 136–145)
TOTAL CK: 68 U/L (ref 26–192)
TOTAL PROTEIN: 6.8 G/DL (ref 6.4–8.2)
TRIGL SERPL-MCNC: 56 MG/DL (ref 0–150)
VLDLC SERPL CALC-MCNC: 11 MG/DL
WBC # BLD: 6.8 K/UL (ref 4–11)

## 2017-12-22 RX ORDER — BLOOD-GLUCOSE METER
1 KIT MISCELLANEOUS 4 TIMES DAILY
Qty: 1 DEVICE | Refills: 0 | Status: SHIPPED | OUTPATIENT
Start: 2017-12-22 | End: 2018-03-27 | Stop reason: SDUPTHER

## 2017-12-22 NOTE — TELEPHONE ENCOUNTER
Pt calling states she is a diabetic and has lost her glucose monitor, pt going to [facility] to get a new one.      Pt states if a Rx: is written then medicaid will pay for it.     [phone] [id number] Isis Blandon to leave dtvm

## 2017-12-23 LAB
ESTIMATED AVERAGE GLUCOSE: 200.1 MG/DL
HBA1C MFR BLD: 8.6 %

## 2017-12-27 ASSESSMENT — ENCOUNTER SYMPTOMS
CHEST TIGHTNESS: 0
BLOOD IN STOOL: 0
WHEEZING: 0
ABDOMINAL PAIN: 0
COUGH: 0
SHORTNESS OF BREATH: 0

## 2017-12-27 NOTE — PROGRESS NOTES
SUBJECTIVE:  Erendira Petty   1965   female   No Known Allergies    Chief Complaint   Patient presents with    Diabetes     3 month med check         Patient Active Problem List    Diagnosis Date Noted    Diabetes mellitus type 2 in obese ([address] 01/31/2017    Angina effort ([address] 11/09/2015    Coronary artery disease involving native coronary artery of native heart without angina pectoris 10/29/2015    Mild intermittent asthma without complication 80/60/2060    Type 1 diabetes mellitus ([address]      IDDM      Diabetic retinopathy ([address]     Asthma     Irritable bowel syndrome     Dysthymia 04/21/2014    HTN (hypertension) 04/21/2014    IDDM (insulin dependent diabetes mellitus) ([address] 04/21/2014    CAD (coronary artery disease) 04/21/2014    Hyperlipidemia 04/21/2014    Dyslipidemia 10/28/2012       HPI   Pt Is here today for fu on HTN, hyperlipidemia, CAD, depression, and diabetes. She has been stable on current mgt. Denies CP,SOB or myalgias. No ha,change in vision or neurologic sx. jeremi sx of depression or anxiety. Sleeping well. Reports ambulatory BS to be good. Has not been adhering to a very good diet. Pt has a hx of CAD and follows up regularly with cardiologist. No recent changes in meds or recommendations. Remote hx of asthma. Has not had any recent exacerbations. Very occasional Alb use. Past Medical History:   Diagnosis Date    Asthma     CAD (coronary artery disease)     Depression     Diabetes ([address]     IDDM    Diabetic retinopathy ([address]     Irritable bowel syndrome      Social History     Social History    Marital status: Single     Spouse name: N/A    Number of children: N/A    Years of education: N/A     Occupational History    Not on file.      Social History Main Topics    Smoking status: Former Smoker     Quit date: 4/21/2002    Smokeless tobacco: Never Used    Alcohol use 0.0 oz/week    Drug use: No    Sexual activity: Not on file     Other Topics Concern    Not on file     Social History Narrative    Lives by herself     Family History   Problem Relation Age of Onset    Depression Mother     Heart Disease Mother     Depression Maternal Grandmother     Heart Disease Maternal Grandmother     Heart Disease Maternal Grandfather     Heart Disease Paternal Aunt        Review of Systems   Constitutional: Negative for activity change, appetite change and unexpected weight change. Respiratory: Negative for cough, chest tightness, shortness of breath and wheezing. Cardiovascular: Negative for chest pain, palpitations and leg swelling. Gastrointestinal: Negative for abdominal pain and blood in stool. Endocrine: Negative for cold intolerance and heat intolerance. Musculoskeletal: Negative for arthralgias and myalgias. Skin: Negative for rash. Neurological: Negative for light-headedness and headaches. Hematological: Negative for adenopathy. Does not bruise/bleed easily. Psychiatric/Behavioral: Negative for dysphoric mood, sleep disturbance and suicidal ideas. The patient is not nervous/anxious. OBJECTIVE:  /68 (Site: Right Arm, Position: Sitting, Cuff Size: Medium Adult)   Pulse 98   Temp 98.3 °F (36.8 °C) (Oral)   Wt 171 lb 6.4 oz (77.7 kg)   SpO2 98%   BMI 31.35 kg/m²   Physical Exam   Constitutional: She is oriented to person, place, and time. She appears well-developed and well-nourished. Eyes: EOM are normal. Pupils are equal, round, and reactive to light. Neck: Normal range of motion. Neck supple. No JVD present. No thyromegaly present. Cardiovascular: Normal rate and regular rhythm. Pulmonary/Chest: Effort normal and breath sounds normal.   Abdominal: Soft. Bowel sounds are normal. There is no tenderness. Musculoskeletal:   Feet-no lesions   Neurological: She is alert and oriented to person, place, and time. No cranial nerve deficit. Skin: No rash noted. Psychiatric: She has a normal mood and affect.  Her behavior is normal. Thought content normal.   Nursing note and vitals reviewed. ASSESSMENT/PLAN:    1. Type 1 diabetes mellitus with other specified complication (HCC)  Continue current mgt  Refill meds  Continue ambulatory BS checks  Labs  Encouraged appr diet mgt  - POCT Glucose  - Comprehensive Metabolic Panel; Future  - Hemoglobin A1C; Future    2. Coronary artery disease involving native coronary artery of native heart without angina pectoris  Continue fu with cardiologist as advised    3. Essential hypertension  Refill meds  Intermittent ambulatory BP checks  labs    4. Pure hypercholesterolemia  Refill Lipitor  appr diet mgt  labs  - CK; Future  - CBC Auto Differential; Future  - Lipid Panel; Future    5.  Mild persistent asthma without complication  Prn Alb    6.depression  Continue Prozac  Orders Placed This Encounter   Procedures    Comprehensive Metabolic Panel     Standing Status:   Future     Number of Occurrences:   1     Standing Expiration Date:   1/7/2019    CK     Standing Status:   Future     Number of Occurrences:   1     Standing Expiration Date:   1/7/2019    CBC Auto Differential     Standing Status:   Future     Number of Occurrences:   1     Standing Expiration Date:   1/7/2019    Lipid Panel     Standing Status:   Future     Number of Occurrences:   1     Standing Expiration Date:   1/7/2019     Order Specific Question:   Is Patient Fasting?/# of Hours     Answer:   10 hrs    Hemoglobin A1C     Standing Status:   Future     Number of Occurrences:   1     Standing Expiration Date:   1/7/2019    POCT Glucose     Current Outpatient Prescriptions   Medication Sig Dispense Refill    insulin glargine (BASAGLAR KWIKPEN) 100 UNIT/ML injection pen Inject 24 Units into the skin nightly INJECT 24 UNITS UNDER THE SKIN EVERY NIGHT 10 pen 0    insulin aspart (NOVOLOG) 100 UNIT/ML injection vial Inject 5 Units into the skin 3 times daily (before meals) 1 vial 1    FLUoxetine (PROZAC) 20 MG capsule Take 1 capsule patient needs the One Touch Ultra Glucometer. Would not come up in epic.  aspirin 81 MG tablet Take 81 mg by mouth daily.  clopidogrel (PLAVIX) 75 MG tablet Take 75 mg by mouth daily. No current facility-administered medications for this visit. Mammogram and pap advised  Return in about 3 months (around 3/18/2018), or if symptoms worsen or fail to improve, for diabetes,HTN,depression.     Car Driver MD

## 2018-01-17 RX ORDER — INSULIN GLARGINE 100 [IU]/ML
INJECTION, SOLUTION SUBCUTANEOUS
Qty: 10 PEN | Refills: 1 | Status: SHIPPED | OUTPATIENT
Start: 2018-01-17 | End: 2018-03-27 | Stop reason: SDUPTHER

## 2018-01-24 ENCOUNTER — TELEPHONE (OUTPATIENT)
Dept: FAMILY MEDICINE CLINIC | Age: 53
End: 2018-01-24

## 2018-01-24 NOTE — TELEPHONE ENCOUNTER
The patient had labs at Dr. Fanta Rivera office and is due there for a med check but her ride cancelled and she cant get there. They wont go over her test results with her. She cant get in there until the end of next month and she wonders if any of the results how anything critical that she needs to know about. They are in [facility]. She would like Dr. Yoli Bass to review them if possible and someone call her from here since we are her PCP.   Told her I will ask Yoli Sneed

## 2018-03-07 RX ORDER — PEN NEEDLE, DIABETIC 30 GX3/16"
NEEDLE, DISPOSABLE MISCELLANEOUS
Qty: 50 EACH | Refills: 0 | Status: SHIPPED | OUTPATIENT
Start: 2018-03-07 | End: 2018-03-27 | Stop reason: SDUPTHER

## 2018-03-27 ENCOUNTER — OFFICE VISIT (OUTPATIENT)
Dept: FAMILY MEDICINE CLINIC | Age: 53
End: 2018-03-27

## 2018-03-27 VITALS
DIASTOLIC BLOOD PRESSURE: 60 MMHG | HEART RATE: 63 BPM | OXYGEN SATURATION: 98 % | WEIGHT: 177.8 LBS | BODY MASS INDEX: 32.52 KG/M2 | SYSTOLIC BLOOD PRESSURE: 122 MMHG | TEMPERATURE: 98.4 F

## 2018-03-27 DIAGNOSIS — I25.10 CORONARY ARTERY DISEASE INVOLVING NATIVE CORONARY ARTERY OF NATIVE HEART WITHOUT ANGINA PECTORIS: ICD-10-CM

## 2018-03-27 DIAGNOSIS — J45.20 MILD INTERMITTENT ASTHMA WITHOUT COMPLICATION: ICD-10-CM

## 2018-03-27 DIAGNOSIS — R80.9 MICROALBUMINURIA: ICD-10-CM

## 2018-03-27 DIAGNOSIS — I10 ESSENTIAL HYPERTENSION: ICD-10-CM

## 2018-03-27 DIAGNOSIS — E78.00 PURE HYPERCHOLESTEROLEMIA: ICD-10-CM

## 2018-03-27 DIAGNOSIS — F32.A DEPRESSION, UNSPECIFIED DEPRESSION TYPE: ICD-10-CM

## 2018-03-27 LAB
BILIRUBIN, POC: NORMAL
BLOOD URINE, POC: NORMAL
CLARITY, POC: CLEAR
COLOR, POC: YELLOW
GLUCOSE BLD-MCNC: 146 MG/DL
GLUCOSE URINE, POC: NORMAL
KETONES, POC: NORMAL
LEUKOCYTE EST, POC: NORMAL
NITRITE, POC: NORMAL
PH, POC: 7
PROTEIN, POC: NORMAL
SPECIFIC GRAVITY, POC: 1.02
UROBILINOGEN, POC: 0.2

## 2018-03-27 PROCEDURE — G8427 DOCREV CUR MEDS BY ELIG CLIN: HCPCS | Performed by: FAMILY MEDICINE

## 2018-03-27 PROCEDURE — 3046F HEMOGLOBIN A1C LEVEL >9.0%: CPT | Performed by: FAMILY MEDICINE

## 2018-03-27 PROCEDURE — G8417 CALC BMI ABV UP PARAM F/U: HCPCS | Performed by: FAMILY MEDICINE

## 2018-03-27 PROCEDURE — 1036F TOBACCO NON-USER: CPT | Performed by: FAMILY MEDICINE

## 2018-03-27 PROCEDURE — 82962 GLUCOSE BLOOD TEST: CPT | Performed by: FAMILY MEDICINE

## 2018-03-27 PROCEDURE — 81002 URINALYSIS NONAUTO W/O SCOPE: CPT | Performed by: FAMILY MEDICINE

## 2018-03-27 PROCEDURE — G8482 FLU IMMUNIZE ORDER/ADMIN: HCPCS | Performed by: FAMILY MEDICINE

## 2018-03-27 PROCEDURE — 3014F SCREEN MAMMO DOC REV: CPT | Performed by: FAMILY MEDICINE

## 2018-03-27 PROCEDURE — 99214 OFFICE O/P EST MOD 30 MIN: CPT | Performed by: FAMILY MEDICINE

## 2018-03-27 PROCEDURE — G8598 ASA/ANTIPLAT THER USED: HCPCS | Performed by: FAMILY MEDICINE

## 2018-03-27 PROCEDURE — 3017F COLORECTAL CA SCREEN DOC REV: CPT | Performed by: FAMILY MEDICINE

## 2018-03-27 RX ORDER — PEN NEEDLE, DIABETIC 30 GX3/16"
1 NEEDLE, DISPOSABLE MISCELLANEOUS 4 TIMES DAILY
Qty: 50 EACH | Refills: 0 | Status: SHIPPED | OUTPATIENT
Start: 2018-03-27 | End: 2018-04-21 | Stop reason: SDUPTHER

## 2018-03-27 RX ORDER — BLOOD SUGAR DIAGNOSTIC
1 STRIP MISCELLANEOUS 3 TIMES DAILY
Qty: 150 EACH | Refills: 1 | Status: SHIPPED | OUTPATIENT
Start: 2018-03-27 | End: 2018-06-25 | Stop reason: SDUPTHER

## 2018-03-27 RX ORDER — FLUOXETINE HYDROCHLORIDE 20 MG/1
20 CAPSULE ORAL DAILY
Qty: 30 CAPSULE | Refills: 2 | Status: SHIPPED | OUTPATIENT
Start: 2018-03-27 | End: 2018-06-25 | Stop reason: SDUPTHER

## 2018-03-27 RX ORDER — BLOOD-GLUCOSE METER
1 KIT MISCELLANEOUS 4 TIMES DAILY
Qty: 1 DEVICE | Refills: 0 | Status: SHIPPED | OUTPATIENT
Start: 2018-03-27 | End: 2021-11-16 | Stop reason: ALTCHOICE

## 2018-03-27 RX ORDER — SYRINGE AND NEEDLE,INSULIN,1ML 31 GX5/16"
1 SYRINGE, EMPTY DISPOSABLE MISCELLANEOUS 2 TIMES DAILY
Qty: 100 EACH | Refills: 2 | Status: SHIPPED | OUTPATIENT
Start: 2018-03-27 | End: 2019-04-15 | Stop reason: SDUPTHER

## 2018-03-27 ASSESSMENT — PATIENT HEALTH QUESTIONNAIRE - PHQ9
SUM OF ALL RESPONSES TO PHQ9 QUESTIONS 1 & 2: 0
1. LITTLE INTEREST OR PLEASURE IN DOING THINGS: 0
2. FEELING DOWN, DEPRESSED OR HOPELESS: 0
SUM OF ALL RESPONSES TO PHQ QUESTIONS 1-9: 0

## 2018-03-28 ASSESSMENT — ENCOUNTER SYMPTOMS
CHEST TIGHTNESS: 0
SHORTNESS OF BREATH: 0
BLOOD IN STOOL: 0
ABDOMINAL PAIN: 0
COUGH: 0

## 2018-03-28 NOTE — PROGRESS NOTES
SUBJECTIVE:  Bal Petty   1965   female   No Known Allergies    Chief Complaint   Patient presents with    Diabetes     3 month check    Anxiety        Patient Active Problem List    Diagnosis Date Noted    Diabetes mellitus type 2 in obese ([address] 01/31/2017    Angina effort ([address] 11/09/2015    CAD S/P percutaneous coronary angioplasty 11/09/2015    Coronary artery disease involving native coronary artery of native heart without angina pectoris 10/29/2015     Overview:   ang mLAD + pLCX st'd diffuse D + dLCX dz  '03  ang dRCAst'd LCA same '05  dRCA 35%ISR LCA same EF 60  5/06  MPI nml EF58  8/07      Mild intermittent asthma without complication 49/79/7176    Type 1 diabetes mellitus ([address]      IDDM      Di[facility] [address]     Asthma     Irritable bowel syndrome     Dysthymia 04/21/2014    HTN (hypertension) 04/21/2014    IDDM (insulin dependent diabetes mellitus) ([facility] 04/21/2014    CAD (coronary artery disease) 04/21/2014    Hyperlipidemia 04/21/2014    Dyslipidemia 10/28/2012       HPI   Pt is here today for fu on type 1 diabetes, HTN, hyperlipidemia, depression, mild/intermittent asthma, and CAD. She is currently being evaluated by nephrologist and has been advised to fu for lab results and further consultation. She has been stable on current medication regimen and reports BS are good at home. She is also followed by cardiology for CAD and no recent changes in tx or new recommendations. No other complaints today. Denies sx of depression or anxiety. Sleeping well. No GI sx. No asthma flare up or need for tx for a while.   Past Medical History:   Diagnosis Date    Asthma     CAD (coronary artery disease)     Depression     Diabetes ([facility]     IDDM    Diabetic retinopathy ([facility]     Irritable bowel syndrome      Social History     Social History    Marital status: Single     Spouse name: N/A    Number of children: N/A    Years of education: N/A     Occupational History    Not tenderness. Musculoskeletal:   Feet-no lesions   Neurological: She is alert and oriented to person, place, and time. Skin: No rash noted. Psychiatric: She has a normal mood and affect. Her behavior is normal. Thought content normal.   Nursing note and vitals reviewed. ASSESSMENT/PLAN:    1. IDDM (insulin dependent diabetes mellitus) ([facility]  continue current mgt  Labs  appr diet mgt and increase physical activity    - POCT Urinalysis no Micro  - POCT Glucose  - CBC Auto Differential; Future  - Hemoglobin A1C; Future    2. Coronary artery disease involving native coronary artery of native heart without angina pectoris  continue fu with cardiologist as advised    3. Pure hypercholesterolemia  Refill Lipitor  Labs  Diet mgt  - Comprehensive Metabolic Panel; Future  - Lipid Panel; Future    4. Depression, unspecified depression type  Continue Prozac    5. Mild intermittent asthma without complication  Prn tx    6. Essential hypertension  Continue Zestril  Intermittent ambulatory BP checks    7.  Microalbuminuria  contineu fu with nephrologist      Orders Placed This Encounter   Procedures    CBC Auto Differential     Standing Status:   Future     Standing Expiration Date:   4/16/2019    Comprehensive Metabolic Panel     Standing Status:   Future     Standing Expiration Date:   4/16/2019    Lipid Panel     Standing Status:   Future     Standing Expiration Date:   4/16/2019     Order Specific Question:   Is Patient Fasting?/# of Hours     Answer:   10 hrs    Hemoglobin A1C     Standing Status:   Future     Standing Expiration Date:   4/16/2019    POCT Urinalysis no Micro    POCT Glucose     Current Outpatient Prescriptions   Medication Sig Dispense Refill    Insulin Pen Needle (PEN NEEDLES) 32G X 4 MM MISC Inject 1 Units into the skin 4 times daily 50 each 0    insulin glargine (BASAGLAR KWIKPEN) 100 UNIT/ML injection pen INJECT 24 UNITS INTO THE SKIN NIGHTLY 10 pen 1    Blood Glucose Monitoring Suppl (FREESTYLE LITE) QUAN Inject 1 Device into the skin 4 times daily 1 Device 0    insulin aspart (NOVOLOG) 100 UNIT/ML injection vial Inject 5 Units into the skin 3 times daily (before meals) 1 vial 1    FLUoxetine (PROZAC) 20 MG capsule Take 1 capsule by mouth daily 30 capsule 2    Insulin Syringe-Needle U-100 (B-D INS SYR ULTRAFINE 1CC/31G) 31G X 5/16\" 1 ML MISC Inject 1 each into the skin 3 times daily 150 each 1    glucose blood VI test strips (ASCENSIA AUTODISC VI;ONE TOUCH ULTRA TEST VI) strip 1 each by In Vitro route 6 times daily The patient tests six times daily. She uses the freestyle strips 180 each 2    COMFORT ASSIST INSULIN SYRINGE 31G X 5/16\" 1 ML MISC Inject 1 each into the skin 2 times daily 100 each 2    polyethylene glycol (GLYCOLAX) powder MIX 17 GRAMS IN LIQUID OF CHOICE AND TK ONCE D PRF CONSTIPATION  1    nitroGLYCERIN (NITROSTAT) 0.3 MG SL tablet       FREESTYLE LANCETS MISC Inject 1 each into the skin 2 times daily 100 each 2    VENTOLIN  (90 BASE) MCG/ACT inhaler INHALE TWO PUFFS BY MOUTH EVERY 6 HOURS AS NEEDED FOR WHEEZING 1 Inhaler 0    atorvastatin (LIPITOR) 80 MG tablet       lisinopril (PRINIVIL;ZESTRIL) 20 MG tablet Take 20 mg by mouth      metoprolol (TOPROL-XL) 50 MG XL tablet Take 50 mg by mouth      amLODIPine (NORVASC) 5 MG tablet Take 5 mg by mouth      Blood Glucose Monitoring Suppl (ONE TOUCH ULTRA MINI) W/DEVICE KIT 1 kit by Does not apply route daily as needed 1 kit 0    Tuberculin-Allergy Syringes (B-D PLASTIPAK SYRINGES ALLERGY) 28G X 1/2\" 1 ML MISC 1 each by Does not apply route daily as needed 50 each 1    Glucose Blood (GLUCOMETER ELITE TEST VI) by In Vitro route. The patient needs the One Touch Ultra Glucometer. Would not come up in epic.  aspirin 81 MG tablet Take 81 mg by mouth daily.  clopidogrel (PLAVIX) 75 MG tablet Take 75 mg by mouth daily.       baclofen (LIORESAL) 10 MG tablet TK 1 T PO TID PRF MUSCLE SPASMS  0   

## 2018-03-29 DIAGNOSIS — E78.00 PURE HYPERCHOLESTEROLEMIA: ICD-10-CM

## 2018-03-29 LAB
A/G RATIO: 1.8 (ref 1.1–2.2)
ALBUMIN SERPL-MCNC: 4.2 G/DL (ref 3.4–5)
ALP BLD-CCNC: 89 U/L (ref 40–129)
ALT SERPL-CCNC: 31 U/L (ref 10–40)
ANION GAP SERPL CALCULATED.3IONS-SCNC: 13 MMOL/L (ref 3–16)
AST SERPL-CCNC: 24 U/L (ref 15–37)
BASOPHILS ABSOLUTE: 0.1 K/UL (ref 0–0.2)
BASOPHILS RELATIVE PERCENT: 1.3 %
BILIRUB SERPL-MCNC: 0.4 MG/DL (ref 0–1)
BUN BLDV-MCNC: 14 MG/DL (ref 7–20)
CALCIUM SERPL-MCNC: 8.9 MG/DL (ref 8.3–10.6)
CHLORIDE BLD-SCNC: 99 MMOL/L (ref 99–110)
CHOLESTEROL, TOTAL: 145 MG/DL (ref 0–199)
CO2: 28 MMOL/L (ref 21–32)
CREAT SERPL-MCNC: 0.7 MG/DL (ref 0.6–1.1)
EOSINOPHILS ABSOLUTE: 0.3 K/UL (ref 0–0.6)
EOSINOPHILS RELATIVE PERCENT: 5.3 %
GFR AFRICAN AMERICAN: >60
GFR NON-AFRICAN AMERICAN: >60
GLOBULIN: 2.4 G/DL
GLUCOSE BLD-MCNC: 156 MG/DL (ref 70–99)
HCT VFR BLD CALC: 37.3 % (ref 36–48)
HDLC SERPL-MCNC: 67 MG/DL (ref 40–60)
HEMOGLOBIN: 12.5 G/DL (ref 12–16)
LDL CHOLESTEROL CALCULATED: 63 MG/DL
LYMPHOCYTES ABSOLUTE: 1.8 K/UL (ref 1–5.1)
LYMPHOCYTES RELATIVE PERCENT: 27 %
MCH RBC QN AUTO: 30 PG (ref 26–34)
MCHC RBC AUTO-ENTMCNC: 33.4 G/DL (ref 31–36)
MCV RBC AUTO: 90 FL (ref 80–100)
MONOCYTES ABSOLUTE: 0.6 K/UL (ref 0–1.3)
MONOCYTES RELATIVE PERCENT: 8.5 %
NEUTROPHILS ABSOLUTE: 3.8 K/UL (ref 1.7–7.7)
NEUTROPHILS RELATIVE PERCENT: 57.9 %
PDW BLD-RTO: 12.7 % (ref 12.4–15.4)
PLATELET # BLD: 317 K/UL (ref 135–450)
PMV BLD AUTO: 8.8 FL (ref 5–10.5)
POTASSIUM SERPL-SCNC: 4.9 MMOL/L (ref 3.5–5.1)
RBC # BLD: 4.15 M/UL (ref 4–5.2)
SODIUM BLD-SCNC: 140 MMOL/L (ref 136–145)
TOTAL PROTEIN: 6.6 G/DL (ref 6.4–8.2)
TRIGL SERPL-MCNC: 73 MG/DL (ref 0–150)
VLDLC SERPL CALC-MCNC: 15 MG/DL
WBC # BLD: 6.6 K/UL (ref 4–11)

## 2018-03-30 LAB
ESTIMATED AVERAGE GLUCOSE: 197.3 MG/DL
HBA1C MFR BLD: 8.5 %

## 2018-04-24 RX ORDER — PEN NEEDLE, DIABETIC 30 GX3/16"
NEEDLE, DISPOSABLE MISCELLANEOUS
Qty: 100 EACH | Refills: 0 | Status: SHIPPED | OUTPATIENT
Start: 2018-04-24 | End: 2022-01-04 | Stop reason: SDUPTHER

## 2018-05-09 ENCOUNTER — TELEPHONE (OUTPATIENT)
Dept: FAMILY MEDICINE CLINIC | Age: 53
End: 2018-05-09

## 2018-05-14 RX ORDER — PEN NEEDLE, DIABETIC 30 GX3/16"
NEEDLE, DISPOSABLE MISCELLANEOUS
Qty: 30 EACH | Refills: 0 | Status: SHIPPED | OUTPATIENT
Start: 2018-05-14 | End: 2018-06-25 | Stop reason: SDUPTHER

## 2018-06-25 ENCOUNTER — OFFICE VISIT (OUTPATIENT)
Dept: FAMILY MEDICINE CLINIC | Age: 53
End: 2018-06-25

## 2018-06-25 VITALS
SYSTOLIC BLOOD PRESSURE: 134 MMHG | HEART RATE: 56 BPM | BODY MASS INDEX: 31.17 KG/M2 | OXYGEN SATURATION: 98 % | WEIGHT: 170.4 LBS | TEMPERATURE: 97.8 F | DIASTOLIC BLOOD PRESSURE: 72 MMHG

## 2018-06-25 DIAGNOSIS — Z12.11 COLON CANCER SCREENING: ICD-10-CM

## 2018-06-25 DIAGNOSIS — E78.00 PURE HYPERCHOLESTEROLEMIA: ICD-10-CM

## 2018-06-25 DIAGNOSIS — R07.89 CHEST WALL PAIN: ICD-10-CM

## 2018-06-25 DIAGNOSIS — I25.10 CORONARY ARTERY DISEASE INVOLVING NATIVE CORONARY ARTERY OF NATIVE HEART WITHOUT ANGINA PECTORIS: ICD-10-CM

## 2018-06-25 DIAGNOSIS — I10 ESSENTIAL HYPERTENSION: ICD-10-CM

## 2018-06-25 DIAGNOSIS — E10.69 TYPE 1 DIABETES MELLITUS WITH OTHER SPECIFIED COMPLICATION (HCC): Primary | ICD-10-CM

## 2018-06-25 DIAGNOSIS — F32.A DEPRESSION, UNSPECIFIED DEPRESSION TYPE: ICD-10-CM

## 2018-06-25 LAB — GLUCOSE BLD-MCNC: 311 MG/DL

## 2018-06-25 PROCEDURE — 81002 URINALYSIS NONAUTO W/O SCOPE: CPT | Performed by: FAMILY MEDICINE

## 2018-06-25 PROCEDURE — 99214 OFFICE O/P EST MOD 30 MIN: CPT | Performed by: FAMILY MEDICINE

## 2018-06-25 PROCEDURE — G8427 DOCREV CUR MEDS BY ELIG CLIN: HCPCS | Performed by: FAMILY MEDICINE

## 2018-06-25 PROCEDURE — 3017F COLORECTAL CA SCREEN DOC REV: CPT | Performed by: FAMILY MEDICINE

## 2018-06-25 PROCEDURE — 3045F PR MOST RECENT HEMOGLOBIN A1C LEVEL 7.0-9.0%: CPT | Performed by: FAMILY MEDICINE

## 2018-06-25 PROCEDURE — G8417 CALC BMI ABV UP PARAM F/U: HCPCS | Performed by: FAMILY MEDICINE

## 2018-06-25 PROCEDURE — 2022F DILAT RTA XM EVC RTNOPTHY: CPT | Performed by: FAMILY MEDICINE

## 2018-06-25 PROCEDURE — 1036F TOBACCO NON-USER: CPT | Performed by: FAMILY MEDICINE

## 2018-06-25 PROCEDURE — 82962 GLUCOSE BLOOD TEST: CPT | Performed by: FAMILY MEDICINE

## 2018-06-25 PROCEDURE — G8598 ASA/ANTIPLAT THER USED: HCPCS | Performed by: FAMILY MEDICINE

## 2018-06-25 RX ORDER — PEN NEEDLE, DIABETIC 30 GX3/16"
NEEDLE, DISPOSABLE MISCELLANEOUS
Qty: 30 EACH | Refills: 0 | Status: SHIPPED | OUTPATIENT
Start: 2018-06-25 | End: 2019-04-15 | Stop reason: SDUPTHER

## 2018-06-25 RX ORDER — ATORVASTATIN CALCIUM 80 MG/1
TABLET, FILM COATED ORAL
COMMUNITY
Start: 2018-06-15 | End: 2018-06-25

## 2018-06-25 RX ORDER — METOPROLOL SUCCINATE 50 MG/1
TABLET, EXTENDED RELEASE ORAL
COMMUNITY
Start: 2018-06-15 | End: 2018-06-25

## 2018-06-25 RX ORDER — BLOOD SUGAR DIAGNOSTIC
1 STRIP MISCELLANEOUS 3 TIMES DAILY
Qty: 150 EACH | Refills: 1 | Status: SHIPPED | OUTPATIENT
Start: 2018-06-25 | End: 2022-03-31 | Stop reason: SDUPTHER

## 2018-06-25 RX ORDER — SYRINGE AND NEEDLE,INSULIN,1ML 31 GX5/16"
1 SYRINGE, EMPTY DISPOSABLE MISCELLANEOUS 2 TIMES DAILY
Qty: 100 EACH | Refills: 2 | Status: CANCELLED | OUTPATIENT
Start: 2018-06-25

## 2018-06-25 RX ORDER — FLUOXETINE HYDROCHLORIDE 20 MG/1
20 CAPSULE ORAL DAILY
Qty: 30 CAPSULE | Refills: 2 | Status: SHIPPED | OUTPATIENT
Start: 2018-06-25 | End: 2018-09-18 | Stop reason: SDUPTHER

## 2018-06-25 RX ORDER — AMLODIPINE BESYLATE 5 MG/1
TABLET ORAL
COMMUNITY
Start: 2018-04-23 | End: 2018-06-25

## 2018-06-25 RX ORDER — BLOOD-GLUCOSE METER
1 KIT MISCELLANEOUS 4 TIMES DAILY
Qty: 1 DEVICE | Refills: 0 | Status: CANCELLED | OUTPATIENT
Start: 2018-06-25

## 2018-06-25 ASSESSMENT — PATIENT HEALTH QUESTIONNAIRE - PHQ9
1. LITTLE INTEREST OR PLEASURE IN DOING THINGS: 0
SUM OF ALL RESPONSES TO PHQ QUESTIONS 1-9: 0
SUM OF ALL RESPONSES TO PHQ9 QUESTIONS 1 & 2: 0
2. FEELING DOWN, DEPRESSED OR HOPELESS: 0

## 2018-06-26 ASSESSMENT — ENCOUNTER SYMPTOMS
ABDOMINAL PAIN: 0
DIARRHEA: 0
COUGH: 0
CONSTIPATION: 0
SHORTNESS OF BREATH: 0
WHEEZING: 0
BLOOD IN STOOL: 0
CHEST TIGHTNESS: 0

## 2018-07-06 DIAGNOSIS — E10.69 TYPE 1 DIABETES MELLITUS WITH OTHER SPECIFIED COMPLICATION (HCC): ICD-10-CM

## 2018-07-06 DIAGNOSIS — E78.00 PURE HYPERCHOLESTEROLEMIA: ICD-10-CM

## 2018-07-06 LAB
A/G RATIO: 2 (ref 1.1–2.2)
ALBUMIN SERPL-MCNC: 4.3 G/DL (ref 3.4–5)
ALP BLD-CCNC: 87 U/L (ref 40–129)
ALT SERPL-CCNC: 19 U/L (ref 10–40)
ANION GAP SERPL CALCULATED.3IONS-SCNC: 10 MMOL/L (ref 3–16)
AST SERPL-CCNC: 16 U/L (ref 15–37)
BASOPHILS ABSOLUTE: 0.1 K/UL (ref 0–0.2)
BASOPHILS RELATIVE PERCENT: 1.2 %
BILIRUB SERPL-MCNC: 0.4 MG/DL (ref 0–1)
BUN BLDV-MCNC: 12 MG/DL (ref 7–20)
CALCIUM SERPL-MCNC: 9.2 MG/DL (ref 8.3–10.6)
CHLORIDE BLD-SCNC: 103 MMOL/L (ref 99–110)
CHOLESTEROL, TOTAL: 136 MG/DL (ref 0–199)
CO2: 27 MMOL/L (ref 21–32)
CREAT SERPL-MCNC: 0.6 MG/DL (ref 0.6–1.1)
EOSINOPHILS ABSOLUTE: 0.4 K/UL (ref 0–0.6)
EOSINOPHILS RELATIVE PERCENT: 6.1 %
GFR AFRICAN AMERICAN: >60
GFR NON-AFRICAN AMERICAN: >60
GLOBULIN: 2.2 G/DL
GLUCOSE BLD-MCNC: 93 MG/DL (ref 70–99)
HCT VFR BLD CALC: 36.5 % (ref 36–48)
HDLC SERPL-MCNC: 66 MG/DL (ref 40–60)
HEMOGLOBIN: 12.5 G/DL (ref 12–16)
LDL CHOLESTEROL CALCULATED: 58 MG/DL
LYMPHOCYTES ABSOLUTE: 1.5 K/UL (ref 1–5.1)
LYMPHOCYTES RELATIVE PERCENT: 25.3 %
MCH RBC QN AUTO: 30.4 PG (ref 26–34)
MCHC RBC AUTO-ENTMCNC: 34.2 G/DL (ref 31–36)
MCV RBC AUTO: 88.9 FL (ref 80–100)
MONOCYTES ABSOLUTE: 0.4 K/UL (ref 0–1.3)
MONOCYTES RELATIVE PERCENT: 7.2 %
NEUTROPHILS ABSOLUTE: 3.5 K/UL (ref 1.7–7.7)
NEUTROPHILS RELATIVE PERCENT: 60.2 %
PDW BLD-RTO: 12.8 % (ref 12.4–15.4)
PLATELET # BLD: 316 K/UL (ref 135–450)
PMV BLD AUTO: 8.8 FL (ref 5–10.5)
POTASSIUM SERPL-SCNC: 4.6 MMOL/L (ref 3.5–5.1)
RBC # BLD: 4.1 M/UL (ref 4–5.2)
SODIUM BLD-SCNC: 140 MMOL/L (ref 136–145)
TOTAL CK: 57 U/L (ref 26–192)
TOTAL PROTEIN: 6.5 G/DL (ref 6.4–8.2)
TRIGL SERPL-MCNC: 61 MG/DL (ref 0–150)
VLDLC SERPL CALC-MCNC: 12 MG/DL
WBC # BLD: 5.9 K/UL (ref 4–11)

## 2018-07-07 LAB
ESTIMATED AVERAGE GLUCOSE: 205.9 MG/DL
HBA1C MFR BLD: 8.8 %

## 2018-07-16 ENCOUNTER — HOSPITAL ENCOUNTER (OUTPATIENT)
Dept: ENDOSCOPY | Age: 53
Discharge: HOME | End: 2018-07-16
Attending: INTERNAL MEDICINE | Admitting: INTERNAL MEDICINE

## 2018-07-16 LAB
GLUCOSE BLD-MCNC: 250 MG/DL (ref 70–99)
GLUCOSE BLD-MCNC: 267 MG/DL (ref 70–99)
PERFORMED ON: ABNORMAL
PERFORMED ON: ABNORMAL

## 2018-07-16 NOTE — ANESTHESIA PRE-OP
(before meals) 6/25/18   Jeremiah Magallanes MD   FLUoxetine (PROZAC) 20 MG capsule Take 1 capsule by mouth daily 6/25/18   Jeremiah Magallanes MD   Insulin Syringe-Needle U-100 (B-D INS SYR ULTRAFINE 1CC/31G) 31G X 5/16\" 1 ML MISC Inject 1 each into the skin 3 times daily 6/25/18   Dimple Varma MD   blood glucose test strips (ASCENSIA AUTODISC VI;ONE TOUCH ULTRA TEST VI) strip 1 each by In Vitro route 6 times daily The patient tests six times daily.   She uses the freestyle strips 6/25/18   Dimple Varma MD   Insulin Pen Needle (PEN NEEDLES) 32G X 4 MM MISC USE ONE NEW NEEDLE DAILY WITH BASAGLAR 4/24/18   Dimple Varma MD   Blood Glucose Monitoring Suppl (FREESTYLE LITE) QUAN Inject 1 Device into the skin 4 times daily 3/27/18   Jeremiah Magallanes MD   COMFORT ASSIST INSULIN SYRINGE 31G X 5/16\" 1 ML MISC Inject 1 each into the skin 2 times daily 3/27/18   Jeremiah Magallanes MD   baclofen (LIORESAL) 10 MG tablet TK 1 T PO TID PRF MUSCLE SPASMS 8/14/17   Historical Provider, MD   polyethylene glycol (GLYCOLAX) powder MIX 17 GRAMS IN LIQUID OF CHOICE AND TK ONCE D PRF CONSTIPATION 7/14/17   Historical Provider, MD   nitroGLYCERIN (NITROSTAT) 0.3 MG SL tablet     Historical Provider, MD   FREESTYLE LANCETS MISC Inject 1 each into the skin 2 times daily 11/16/16   Lucas Varma MD   VENTOLIN  (90 BASE) MCG/ACT inhaler INHALE TWO PUFFS BY MOUTH EVERY 6 HOURS AS NEEDED FOR WHEEZING 10/24/16   Jeremiah Magallanes MD   atorvastatin (LIPITOR) 80 MG tablet  9/24/16   Historical Provider, MD   lisinopril (PRINIVIL;ZESTRIL) 20 MG tablet Take 20 mg by mouth    Historical Provider, MD   metoprolol (TOPROL-XL) 50 MG XL tablet Take 50 mg by mouth 12/14/15   Historical Provider, MD   amLODIPine (NORVASC) 5 MG tablet Take 5 mg by mouth 12/14/15   Historical Provider, MD   Blood Glucose Monitoring Suppl (ONE TOUCH ULTRA MINI) W/DEVICE KIT 1 kit by Does not apply route daily as needed 6/6/16   Jeremiah Magallanes MD   Tuberculin-Allergy Syringes (B-D PLASTIPAK (NITROSTAT) 0.3 MG SL tablet       FREESTYLE LANCETS MISC Inject 1 each into the skin 2 times daily 100 each 2    VENTOLIN  (90 BASE) MCG/ACT inhaler INHALE TWO PUFFS BY MOUTH EVERY 6 HOURS AS NEEDED FOR WHEEZING 1 Inhaler 0    atorvastatin (LIPITOR) 80 MG tablet       lisinopril (PRINIVIL;ZESTRIL) 20 MG tablet Take 20 mg by mouth      metoprolol (TOPROL-XL) 50 MG XL tablet Take 50 mg by mouth      amLODIPine (NORVASC) 5 MG tablet Take 5 mg by mouth      Blood Glucose Monitoring Suppl (ONE TOUCH ULTRA MINI) W/DEVICE KIT 1 kit by Does not apply route daily as needed 1 kit 0    Tuberculin-Allergy Syringes (B-D PLASTIPAK SYRINGES ALLERGY) 28G X 1/2\" 1 ML MISC 1 each by Does not apply route daily as needed 50 each 1    Insulin NPH Isophane & Regular (HUMULIN 70/30 PEN) (70-30) 100 UNIT/ML injection pen Inject 25 Units into the skin      Glucose Blood (GLUCOMETER ELITE TEST VI) by In Vitro route. The patient needs the One Touch Ultra Glucometer. Would not come up in epic.  aspirin 81 MG tablet Take 81 mg by mouth daily.  clopidogrel (PLAVIX) 75 MG tablet Take 75 mg by mouth daily. No current facility-administered medications for this encounter. Vital Signs  (Current) There were no vitals filed for this visit.   (for past 48 hrs)  No Data Recorded  (last three values)   BP Readings from Last 3 Encounters:   06/25/18 134/72   03/27/18 122/60   12/18/17 114/68       CBC  Lab Results   Component Value Date    WBC 5.9 07/06/2018    RBC 4.10 07/06/2018    HGB 12.5 07/06/2018    HCT 36.5 07/06/2018    MCV 88.9 07/06/2018    RDW 12.8 07/06/2018     07/06/2018       CMP    Lab Results   Component Value Date     07/06/2018    K 4.6 07/06/2018     07/06/2018    CO2 27 07/06/2018    BUN 12 07/06/2018    CREATININE 0.6 07/06/2018    GFRAA >60 07/06/2018    AGRATIO 2.0 07/06/2018    LABGLOM >60 07/06/2018    GLUCOSE 93 07/06/2018    PROT 6.5 07/06/2018    CALCIUM 9.2 07/06/2018    BILITOT 0.4 07/06/2018    ALKPHOS 87 07/06/2018    AST 16 07/06/2018    ALT 19 07/06/2018       BMP    Lab Results   Component Value Date     07/06/2018    K 4.6 07/06/2018     07/06/2018    CO2 27 07/06/2018    BUN 12 07/06/2018    CREATININE 0.6 07/06/2018    CALCIUM 9.2 07/06/2018    GFRAA >60 07/06/2018    LABGLOM >60 07/06/2018    GLUCOSE 93 07/06/2018       Coags   No results found for: PROTIME, INR, APTT    HCG (If Applicable) No results found for: PREGTESTUR, PREGSERUM, HCG, HCGQUANT     ABGs  No results found for: PHART, PO2ART, KOS8PEP, XLB9TKO, BEART, Z8XLSJKO     Type & Screen (If Applicable)  No results found for: LABABO, LABRH      POCGlucose  No results for input(s): GLUCOSE in the last 72 hours. NPO Status  > 8 hours                       BMI  There is no height or weight on file to calculate BMI. Estimated body mass index is 31.09 kg/m² as calculated from the following:    Height as of 7/12/18: 5' 2\" (1.575 m). Weight as of 7/12/18: 170 lb (77.1 kg). Additional Testing (Echo, Stress, ECG, PFTs, etc)        Anesthesia Evaluation    Airway: Mallampati: II  TM distance: >3 FB   Neck ROM: full  Mouth opening: > = 3 FB Dental:          Pulmonary:   (+) asthma:                            Cardiovascular:    (+) hypertension:, angina:, CAD:,         Rhythm: regular  Rate: normal                    Neuro/Psych:   (+) psychiatric history:            GI/Hepatic/Renal:             Endo/Other:    (+) Diabetes, . Abdominal:           Vascular:                                        Anesthesia Plan      MAC     ASA 2       Induction: intravenous. Anesthetic plan and risks discussed with patient. Plan discussed with CRNA. DOS STAFF ADDENDUM:    Pt seen and examined, chart reviewed (including anesthesia, drug and allergy history). No interval changes to history and physical examination.   Anesthetic plan, risks, benefits, alternatives,

## 2018-07-16 NOTE — ANESTHESIA POST-OP
[address] [facility] Anesthesiology  Post-Anesthesia Note       Name:  Dawit Renteria                                         Age:  48 y.o. MRN:  [id number]     Last Vitals & Oxygen Saturation: [facility] 08/26/2016   No data found. Level of consciousness:  Awake, alert    Respiratory: Respirations easy, no distress. Stable. Cardiovascular: Hemodynamically stable. Hydration: Adequate. PONV: Adequately managed. Post-op pain: Adequately controlled. Post-op assessment: Tolerated anesthetic well without complication. Complications:  None.     Shannan Spain MD  July 16, 2018   11:30 AM

## 2018-08-14 RX ORDER — PEN NEEDLE, DIABETIC 30 GX3/16"
NEEDLE, DISPOSABLE MISCELLANEOUS
Qty: 100 EACH | Refills: 0 | Status: SHIPPED | OUTPATIENT
Start: 2018-08-14 | End: 2018-10-08 | Stop reason: SDUPTHER

## 2018-08-14 NOTE — TELEPHONE ENCOUNTER
Medication:   Requested Prescriptions     Pending Prescriptions Disp Refills    Insulin Pen Needle (PEN NEEDLES) 32G X 4 MM MISC [Pharmacy Med Name: Delmer Rao PEN NEEDLES 32G 4MM]  0     Sig: USE 1 NEEDLE DAILY WITH BASAGLAR     Last Filled:  6/25/2018  30/0    Last appt: 6/25/2018   Next appt: 9/24/2018    Last OARRS: No flowsheet data found.    Please advise

## 2018-08-27 ENCOUNTER — OFFICE VISIT (OUTPATIENT)
Dept: FAMILY MEDICINE CLINIC | Age: 53
End: 2018-08-27

## 2018-08-27 VITALS
BODY MASS INDEX: 31.28 KG/M2 | WEIGHT: 171 LBS | OXYGEN SATURATION: 99 % | HEART RATE: 61 BPM | SYSTOLIC BLOOD PRESSURE: 130 MMHG | DIASTOLIC BLOOD PRESSURE: 76 MMHG | TEMPERATURE: 98.2 F

## 2018-08-27 DIAGNOSIS — E11.69 DIABETES MELLITUS TYPE 2 IN OBESE (HCC): ICD-10-CM

## 2018-08-27 DIAGNOSIS — M79.641 RIGHT HAND PAIN: Primary | ICD-10-CM

## 2018-08-27 DIAGNOSIS — E66.9 DIABETES MELLITUS TYPE 2 IN OBESE (HCC): ICD-10-CM

## 2018-08-27 LAB — GLUCOSE BLD-MCNC: 162 MG/DL

## 2018-08-27 PROCEDURE — 82962 GLUCOSE BLOOD TEST: CPT | Performed by: FAMILY MEDICINE

## 2018-08-27 PROCEDURE — G8417 CALC BMI ABV UP PARAM F/U: HCPCS | Performed by: FAMILY MEDICINE

## 2018-08-27 PROCEDURE — 2022F DILAT RTA XM EVC RTNOPTHY: CPT | Performed by: FAMILY MEDICINE

## 2018-08-27 PROCEDURE — 3045F PR MOST RECENT HEMOGLOBIN A1C LEVEL 7.0-9.0%: CPT | Performed by: FAMILY MEDICINE

## 2018-08-27 PROCEDURE — 99213 OFFICE O/P EST LOW 20 MIN: CPT | Performed by: FAMILY MEDICINE

## 2018-08-27 PROCEDURE — 3017F COLORECTAL CA SCREEN DOC REV: CPT | Performed by: FAMILY MEDICINE

## 2018-08-27 PROCEDURE — G8427 DOCREV CUR MEDS BY ELIG CLIN: HCPCS | Performed by: FAMILY MEDICINE

## 2018-08-27 PROCEDURE — G8598 ASA/ANTIPLAT THER USED: HCPCS | Performed by: FAMILY MEDICINE

## 2018-08-27 PROCEDURE — 1036F TOBACCO NON-USER: CPT | Performed by: FAMILY MEDICINE

## 2018-08-27 RX ORDER — NAPROXEN SODIUM 550 MG/1
550 TABLET ORAL 2 TIMES DAILY WITH MEALS
Qty: 30 TABLET | Refills: 0 | Status: SHIPPED | OUTPATIENT
Start: 2018-08-27 | End: 2021-11-16 | Stop reason: ALTCHOICE

## 2018-08-28 ASSESSMENT — ENCOUNTER SYMPTOMS
COUGH: 0
ABDOMINAL PAIN: 0
SHORTNESS OF BREATH: 0

## 2018-08-28 NOTE — PROGRESS NOTES
reviewed. ASSESSMENT/PLAN:    1. Right hand pain  Ice, Anaprox with food prn  - XR HAND RIGHT (2 VIEWS); Future    2. Diabetes mellitus type 2 in obese (HCC)  Continue  Current mgt  - POCT Glucose      Orders Placed This Encounter   Procedures    XR HAND RIGHT (2 VIEWS)     Standing Status:   Future     Standing Expiration Date:   8/27/2019     Order Specific Question:   Reason for exam:     Answer:   pain    POCT Glucose     Current Outpatient Prescriptions   Medication Sig Dispense Refill    naproxen sodium (ANAPROX DS) 550 MG tablet Take 1 tablet by mouth 2 times daily (with meals) 30 tablet 0    Insulin Pen Needle (PEN NEEDLES) 32G X 4 MM MISC USE 1 NEEDLE DAILY WITH BASAGLAR 100 each 0    Insulin Pen Needle (PEN NEEDLES) 32G X 4 MM MISC USE ONE NEW NEEDLE DAILY WITH BASAGLAR 30 each 0    insulin glargine (BASAGLAR KWIKPEN) 100 UNIT/ML injection pen INJECT 24 UNITS INTO THE SKIN NIGHTLY 10 pen 1    insulin aspart (NOVOLOG) 100 UNIT/ML injection vial Inject 5 Units into the skin 3 times daily (before meals) 1 vial 1    FLUoxetine (PROZAC) 20 MG capsule Take 1 capsule by mouth daily 30 capsule 2    Insulin Syringe-Needle U-100 (B-D INS SYR ULTRAFINE 1CC/31G) 31G X 5/16\" 1 ML MISC Inject 1 each into the skin 3 times daily 150 each 1    blood glucose test strips (ASCENSIA AUTODISC VI;ONE TOUCH ULTRA TEST VI) strip 1 each by In Vitro route 6 times daily The patient tests six times daily.   She uses the freestyle strips 180 each 2    Insulin Pen Needle (PEN NEEDLES) 32G X 4 MM MISC USE ONE NEW NEEDLE DAILY WITH BASAGLAR 100 each 0    Blood Glucose Monitoring Suppl (FREESTYLE LITE) QUAN Inject 1 Device into the skin 4 times daily 1 Device 0    COMFORT ASSIST INSULIN SYRINGE 31G X 5/16\" 1 ML MISC Inject 1 each into the skin 2 times daily 100 each 2    FREESTYLE LANCETS MISC Inject 1 each into the skin 2 times daily 100 each 2    atorvastatin (LIPITOR) 80 MG tablet       lisinopril (PRINIVIL;ZESTRIL) 20 MG tablet Take 20 mg by mouth      metoprolol (TOPROL-XL) 50 MG XL tablet Take 50 mg by mouth      amLODIPine (NORVASC) 5 MG tablet Take 5 mg by mouth      Blood Glucose Monitoring Suppl (ONE TOUCH ULTRA MINI) W/DEVICE KIT 1 kit by Does not apply route daily as needed 1 kit 0    Tuberculin-Allergy Syringes (B-D PLASTIPAK SYRINGES ALLERGY) 28G X 1/2\" 1 ML MISC 1 each by Does not apply route daily as needed 50 each 1    Insulin NPH Isophane & Regular (HUMULIN 70/30 PEN) (70-30) 100 UNIT/ML injection pen Inject 25 Units into the skin      Glucose Blood (GLUCOMETER ELITE TEST VI) by In Vitro route. The patient needs the One Touch Ultra Glucometer. Would not come up in epic.  aspirin 81 MG tablet Take 81 mg by mouth daily.  clopidogrel (PLAVIX) 75 MG tablet Take 75 mg by mouth daily.  baclofen (LIORESAL) 10 MG tablet TK 1 T PO TID PRF MUSCLE SPASMS  0    polyethylene glycol (GLYCOLAX) powder MIX 17 GRAMS IN LIQUID OF CHOICE AND TK ONCE D PRF CONSTIPATION  1    nitroGLYCERIN (NITROSTAT) 0.3 MG SL tablet       VENTOLIN  (90 BASE) MCG/ACT inhaler INHALE TWO PUFFS BY MOUTH EVERY 6 HOURS AS NEEDED FOR WHEEZING 1 Inhaler 0     No current facility-administered medications for this visit. Return if symptoms worsen or fail to improve.     Hussein Garza MD

## 2018-08-30 ENCOUNTER — OFFICE VISIT (OUTPATIENT)
Dept: FAMILY MEDICINE CLINIC | Age: 53
End: 2018-08-30

## 2018-08-30 VITALS
HEIGHT: 62 IN | HEART RATE: 92 BPM | SYSTOLIC BLOOD PRESSURE: 120 MMHG | BODY MASS INDEX: 30.99 KG/M2 | DIASTOLIC BLOOD PRESSURE: 80 MMHG | WEIGHT: 168.4 LBS | OXYGEN SATURATION: 98 % | RESPIRATION RATE: 17 BRPM | TEMPERATURE: 100.5 F

## 2018-08-30 DIAGNOSIS — R05.9 COUGH: ICD-10-CM

## 2018-08-30 DIAGNOSIS — J40 BRONCHITIS: Primary | ICD-10-CM

## 2018-08-30 DIAGNOSIS — I10 ESSENTIAL HYPERTENSION: ICD-10-CM

## 2018-08-30 DIAGNOSIS — E10.69 TYPE 1 DIABETES MELLITUS WITH OTHER SPECIFIED COMPLICATION (HCC): ICD-10-CM

## 2018-08-30 PROCEDURE — G8427 DOCREV CUR MEDS BY ELIG CLIN: HCPCS | Performed by: CLINICAL NURSE SPECIALIST

## 2018-08-30 PROCEDURE — 99214 OFFICE O/P EST MOD 30 MIN: CPT | Performed by: CLINICAL NURSE SPECIALIST

## 2018-08-30 PROCEDURE — 1036F TOBACCO NON-USER: CPT | Performed by: CLINICAL NURSE SPECIALIST

## 2018-08-30 PROCEDURE — 94640 AIRWAY INHALATION TREATMENT: CPT | Performed by: CLINICAL NURSE SPECIALIST

## 2018-08-30 PROCEDURE — G8417 CALC BMI ABV UP PARAM F/U: HCPCS | Performed by: CLINICAL NURSE SPECIALIST

## 2018-08-30 PROCEDURE — 3017F COLORECTAL CA SCREEN DOC REV: CPT | Performed by: CLINICAL NURSE SPECIALIST

## 2018-08-30 PROCEDURE — 3045F PR MOST RECENT HEMOGLOBIN A1C LEVEL 7.0-9.0%: CPT | Performed by: CLINICAL NURSE SPECIALIST

## 2018-08-30 PROCEDURE — G8598 ASA/ANTIPLAT THER USED: HCPCS | Performed by: CLINICAL NURSE SPECIALIST

## 2018-08-30 PROCEDURE — 2022F DILAT RTA XM EVC RTNOPTHY: CPT | Performed by: CLINICAL NURSE SPECIALIST

## 2018-08-30 RX ORDER — BENZONATATE 100 MG/1
100 CAPSULE ORAL 3 TIMES DAILY PRN
Qty: 30 CAPSULE | Refills: 0 | Status: SHIPPED | OUTPATIENT
Start: 2018-08-30 | End: 2018-09-09

## 2018-08-30 RX ORDER — ALBUTEROL SULFATE 2.5 MG/3ML
2.5 SOLUTION RESPIRATORY (INHALATION) ONCE
Status: COMPLETED | OUTPATIENT
Start: 2018-08-30 | End: 2018-08-30

## 2018-08-30 RX ORDER — ALBUTEROL SULFATE 90 UG/1
2 AEROSOL, METERED RESPIRATORY (INHALATION) EVERY 6 HOURS PRN
Qty: 1 INHALER | Refills: 0 | Status: SHIPPED | OUTPATIENT
Start: 2018-08-30

## 2018-08-30 RX ORDER — METHYLPREDNISOLONE 4 MG/1
TABLET ORAL
Qty: 1 KIT | Refills: 0 | Status: SHIPPED | OUTPATIENT
Start: 2018-08-30 | End: 2018-09-05

## 2018-08-30 RX ORDER — AMOXICILLIN AND CLAVULANATE POTASSIUM 500; 125 MG/1; MG/1
1 TABLET, FILM COATED ORAL 2 TIMES DAILY
Qty: 14 TABLET | Refills: 0 | Status: SHIPPED | OUTPATIENT
Start: 2018-08-30 | End: 2018-09-06

## 2018-08-30 RX ADMIN — ALBUTEROL SULFATE 2.5 MG: 2.5 SOLUTION RESPIRATORY (INHALATION) at 13:53

## 2018-08-30 ASSESSMENT — ENCOUNTER SYMPTOMS
COUGH: 1
SINUS PRESSURE: 1
RHINORRHEA: 0
DIARRHEA: 0
SHORTNESS OF BREATH: 1
VOMITING: 0
WHEEZING: 1
ABDOMINAL PAIN: 0
SORE THROAT: 0
CHEST TIGHTNESS: 1
NAUSEA: 1

## 2018-08-30 NOTE — PROGRESS NOTES
canal normal.   Nose: Nose normal. Right sinus exhibits no maxillary sinus tenderness and no frontal sinus tenderness. Left sinus exhibits no maxillary sinus tenderness and no frontal sinus tenderness. Mouth/Throat: Oropharynx is clear and moist and mucous membranes are normal.   Eyes: Pupils are equal, round, and reactive to light. Conjunctivae are normal.   Neck: Normal range of motion. Neck supple. No tracheal deviation present. Cardiovascular: Normal rate, regular rhythm and normal heart sounds. Pulmonary/Chest: Effort normal. No respiratory distress. She has wheezes. She has no rales. She exhibits no tenderness. Abdominal: Soft. Bowel sounds are normal.   Musculoskeletal: Normal range of motion. She exhibits no edema. Neurological: She is alert and oriented to person, place, and time. Skin: Skin is warm and dry. No rash noted. Psychiatric: She has a normal mood and affect. Her behavior is normal.   Nursing note and vitals reviewed. /80   Pulse 92   Temp 100.5 °F (38.1 °C)   Resp 17   Ht 5' 2\" (1.575 m)   Wt 168 lb 6.4 oz (76.4 kg)   LMP 08/26/2016   SpO2 98%   BMI 30.80 kg/m²    BP Readings from Last 3 Encounters:   08/30/18 120/80   08/27/18 130/76   06/25/18 134/72      Wt Readings from Last 3 Encounters:   08/30/18 168 lb 6.4 oz (76.4 kg)   08/27/18 171 lb (77.6 kg)   07/12/18 170 lb (77.1 kg)       ASSESSMENT & PLAN:    1. Bronchitis  - amoxicillin-clavulanate (AUGMENTIN) 500-125 MG per tablet; Take 1 tablet by mouth 2 times daily for 7 days  Dispense: 14 tablet; Refill: 0  - albuterol sulfate HFA (PROAIR HFA) 108 (90 Base) MCG/ACT inhaler; Inhale 2 puffs into the lungs every 6 hours as needed for Wheezing  Dispense: 1 Inhaler; Refill: 0  - benzonatate (TESSALON PERLES) 100 MG capsule; Take 1 capsule by mouth 3 times daily as needed for Cough  Dispense: 30 capsule; Refill: 0  - methylPREDNISolone (MEDROL DOSEPACK) 4 MG tablet; Take by mouth. Dispense: 1 kit;  Refill: 0  - 48746 - [place] INHAL RX, AIRWAY OBST/DX SPUTUM INDUCT  - albuterol (PROVENTIL) nebulizer solution 2.5 mg; Take 3 mLs by nebulization once (positive response to respiratory treatment)    2. Cough  - albuterol sulfate HFA (PROAIR HFA) 108 (90 Base) MCG/ACT inhaler; Inhale 2 puffs into the lungs every 6 hours as needed for Wheezing  Dispense: 1 Inhaler; Refill: 0  - benzonatate (TESSALON PERLES) 100 MG capsule; Take 1 capsule by mouth 3 times daily as needed for Cough  Dispense: 30 capsule; Refill: 0  - methylPREDNISolone (MEDROL DOSEPACK) 4 MG tablet; Take by mouth. Dispense: 1 kit; Refill: 0  - 72767 - SC INHAL RX, AIRWAY OBST/DX SPUTUM INDUCT  - albuterol (PROVENTIL) nebulizer solution 2.5 mg; Take 3 mLs by nebulization once (positive response to respiratory treatment)    3. Acute URI  - Antibiotic as directed, twice a day for 7 days  - Flonase 1-2 puffs to each nostril daily. - Albuterol 2 puffs 4 times a day for 1-2 days then as needed. - Steroids as directed, take with food. - Watch blood sugars call is consistently above 350    - Prescription cough medication 3 times a day as needed for cough, may cause drowsiness.  - Cepacol Lozenges as needed for sore throat. - Tylenol and or Motrin as needed/directed for pain and fever.    - Encourage rest and fluids. 4. Type 1 diabetes mellitus with other specified complication (HCC)  - Stable, continue current regimen  - Monitor blood sugars closely well on steroids, call if glucose consistently greater than 350    5. Essential hypertension  - Stable on current regimen      Continue current treatment plan.     Current Outpatient Prescriptions   Medication Sig Dispense Refill    amoxicillin-clavulanate (AUGMENTIN) 500-125 MG per tablet Take 1 tablet by mouth 2 times daily for 7 days 14 tablet 0    albuterol sulfate HFA (PROAIR HFA) 108 (90 Base) MCG/ACT inhaler Inhale 2 puffs into the lungs every 6 hours as needed for Wheezing 1 Inhaler 0    benzonatate mouth      amLODIPine (NORVASC) 5 MG tablet Take 5 mg by mouth      Blood Glucose Monitoring Suppl (ONE TOUCH ULTRA MINI) W/DEVICE KIT 1 kit by Does not apply route daily as needed 1 kit 0    Tuberculin-Allergy Syringes (B-D PLASTIPAK SYRINGES ALLERGY) 28G X 1/2\" 1 ML MISC 1 each by Does not apply route daily as needed 50 each 1    Insulin NPH Isophane & Regular (HUMULIN 70/30 PEN) (70-30) 100 UNIT/ML injection pen Inject 25 Units into the skin      Glucose Blood (GLUCOMETER ELITE TEST VI) by In Vitro route. The patient needs the One Touch Ultra Glucometer. Would not come up in epic.  aspirin 81 MG tablet Take 81 mg by mouth daily.  clopidogrel (PLAVIX) 75 MG tablet Take 75 mg by mouth daily. No current facility-administered medications for this visit. Return if symptoms worsen or fail to improve, for bronchitis, cough, UTI, diabetes, HTN. Call office if experience side effects from medications. Please note that some or all of this record was generated using voice recognition software. If there are any questions about the content of this document, please contact the author as some errors in transcription may have occurred.

## 2018-09-18 ENCOUNTER — OFFICE VISIT (OUTPATIENT)
Dept: FAMILY MEDICINE CLINIC | Age: 53
End: 2018-09-18

## 2018-09-18 VITALS
HEART RATE: 61 BPM | TEMPERATURE: 98.6 F | BODY MASS INDEX: 30.95 KG/M2 | OXYGEN SATURATION: 98 % | DIASTOLIC BLOOD PRESSURE: 70 MMHG | WEIGHT: 169.2 LBS | SYSTOLIC BLOOD PRESSURE: 120 MMHG

## 2018-09-18 DIAGNOSIS — Z12.39 BREAST CANCER SCREENING: ICD-10-CM

## 2018-09-18 DIAGNOSIS — Z12.4 CERVICAL CANCER SCREENING: ICD-10-CM

## 2018-09-18 DIAGNOSIS — E78.5 HYPERLIPIDEMIA, UNSPECIFIED HYPERLIPIDEMIA TYPE: ICD-10-CM

## 2018-09-18 DIAGNOSIS — I10 ESSENTIAL HYPERTENSION: ICD-10-CM

## 2018-09-18 DIAGNOSIS — E66.9 DIABETES MELLITUS TYPE 2 IN OBESE (HCC): Primary | ICD-10-CM

## 2018-09-18 DIAGNOSIS — I25.10 CORONARY ARTERY DISEASE INVOLVING NATIVE CORONARY ARTERY OF NATIVE HEART WITHOUT ANGINA PECTORIS: ICD-10-CM

## 2018-09-18 DIAGNOSIS — F32.A DEPRESSION, UNSPECIFIED DEPRESSION TYPE: ICD-10-CM

## 2018-09-18 DIAGNOSIS — Z23 NEED FOR INFLUENZA VACCINATION: ICD-10-CM

## 2018-09-18 DIAGNOSIS — E11.69 DIABETES MELLITUS TYPE 2 IN OBESE (HCC): Primary | ICD-10-CM

## 2018-09-18 LAB
BILIRUBIN, POC: NORMAL
BLOOD URINE, POC: NORMAL
CLARITY, POC: CLEAR
COLOR, POC: YELLOW
GLUCOSE BLD-MCNC: 155 MG/DL
GLUCOSE URINE, POC: NORMAL
KETONES, POC: NORMAL
LEUKOCYTE EST, POC: NORMAL
NITRITE, POC: NORMAL
PH, POC: 6.5
PROTEIN, POC: NORMAL
SPECIFIC GRAVITY, POC: 1.02
UROBILINOGEN, POC: 0.2

## 2018-09-18 PROCEDURE — 1036F TOBACCO NON-USER: CPT | Performed by: FAMILY MEDICINE

## 2018-09-18 PROCEDURE — 90686 IIV4 VACC NO PRSV 0.5 ML IM: CPT | Performed by: FAMILY MEDICINE

## 2018-09-18 PROCEDURE — G8427 DOCREV CUR MEDS BY ELIG CLIN: HCPCS | Performed by: FAMILY MEDICINE

## 2018-09-18 PROCEDURE — 90471 IMMUNIZATION ADMIN: CPT | Performed by: FAMILY MEDICINE

## 2018-09-18 PROCEDURE — G8598 ASA/ANTIPLAT THER USED: HCPCS | Performed by: FAMILY MEDICINE

## 2018-09-18 PROCEDURE — G8417 CALC BMI ABV UP PARAM F/U: HCPCS | Performed by: FAMILY MEDICINE

## 2018-09-18 PROCEDURE — 81002 URINALYSIS NONAUTO W/O SCOPE: CPT | Performed by: FAMILY MEDICINE

## 2018-09-18 PROCEDURE — 82962 GLUCOSE BLOOD TEST: CPT | Performed by: FAMILY MEDICINE

## 2018-09-18 PROCEDURE — 99214 OFFICE O/P EST MOD 30 MIN: CPT | Performed by: FAMILY MEDICINE

## 2018-09-18 PROCEDURE — 2022F DILAT RTA XM EVC RTNOPTHY: CPT | Performed by: FAMILY MEDICINE

## 2018-09-18 PROCEDURE — 3017F COLORECTAL CA SCREEN DOC REV: CPT | Performed by: FAMILY MEDICINE

## 2018-09-18 PROCEDURE — 3045F PR MOST RECENT HEMOGLOBIN A1C LEVEL 7.0-9.0%: CPT | Performed by: FAMILY MEDICINE

## 2018-09-18 RX ORDER — FLUOXETINE HYDROCHLORIDE 20 MG/1
20 CAPSULE ORAL DAILY
Qty: 30 CAPSULE | Refills: 2 | Status: SHIPPED | OUTPATIENT
Start: 2018-09-18 | End: 2018-12-07 | Stop reason: SDUPTHER

## 2018-09-18 ASSESSMENT — ENCOUNTER SYMPTOMS
ABDOMINAL PAIN: 0
BLOOD IN STOOL: 0
CHEST TIGHTNESS: 0
COUGH: 0
SHORTNESS OF BREATH: 0

## 2018-09-18 NOTE — PROGRESS NOTES
Vaccine Information Sheet, \"Influenza - Inactivated\"  given to [facility], or parent/legal guardian of  [facility] and verbalized understanding. Patient responses:    Have you ever had a reaction to a flu vaccine? No  Are you able to eat eggs without adverse effects? Yes  Do you have any current illness? No  Have you ever had Guillian Houma Syndrome? No    Flu vaccine given per order. Please see immunization tab.

## 2018-09-18 NOTE — PROGRESS NOTES
Years of education: N/A     Occupational History    Not on file. Social History Main Topics    Smoking status: Former Smoker     Quit date: 4/21/2002    Smokeless tobacco: Never Used    Alcohol use No    Drug use: No    Sexual activity: Not on file     Other Topics Concern    Not on file     Social History Narrative    Lives by herself     Family History   Problem Relation Age of Onset    Depression Mother     Heart Disease Mother     Depression Maternal Grandmother     Heart Disease Maternal Grandmother     Heart Disease Maternal Grandfather     Heart Disease Paternal Aunt        Review of Systems   Constitutional: Negative for activity change, appetite change and unexpected weight change. Respiratory: Negative for cough, chest tightness and shortness of breath. Cardiovascular: Negative for chest pain, palpitations and leg swelling. Gastrointestinal: Negative for abdominal pain and blood in stool. Endocrine: Negative for cold intolerance and heat intolerance. Musculoskeletal: Negative for arthralgias and myalgias. Skin: Negative for rash. Neurological: Negative for light-headedness and headaches. Hematological: Negative for adenopathy. Does not bruise/bleed easily. Psychiatric/Behavioral: Negative for dysphoric mood, sleep disturbance and suicidal ideas. The patient is not nervous/anxious. OBJECTIVE:  /70 (Site: Left Upper Arm, Position: Sitting, Cuff Size: Medium Adult)   Pulse 61   Temp 98.6 °F (37 °C) (Oral)   Wt 169 lb 3.2 oz (76.7 kg)   LMP 08/26/2016   SpO2 98%   BMI 30.95 kg/m²   Physical Exam   Constitutional: She is oriented to person, place, and time. She appears well-developed and well-nourished. Eyes: Pupils are equal, round, and reactive to light. EOM are normal.   Neck: Normal range of motion. Neck supple. No JVD present. No thyromegaly present. Cardiovascular: Normal rate and regular rhythm.     Pulmonary/Chest: Effort normal and breath sounds normal.   Abdominal: Soft. Bowel sounds are normal. There is no tenderness. Musculoskeletal:   Feet-no lesions   Neurological: She is alert and oriented to person, place, and time. Skin: No rash noted. Psychiatric: She has a normal mood and affect. Her behavior is normal. Thought content normal.   Nursing note and vitals reviewed. ASSESSMENT/PLAN:    1. Diabetes mellitus type 2 in obese (HCC)  conitinue current mgt  Discussed using sliding scale regularly and ambulatory BS checks  Diabetic eye exam advised   - POCT Glucose  - POCT Urinalysis no Micro    2. Depression, unspecified depression type  Refill prozac    3. Breast cancer screening    - [facility] DIGITAL SCREEN W OR WO CAD BILATERAL; Future    4. Need for influenza vaccination    - INFLUENZA, QUADV, 3 YRS AND OLDER, IM, PF, PREFILL SYR OR SDV, 0.5ML (FLUZONE QUADV, PF)    5. Coronary artery disease involving native coronary artery of native heart without angina pectoris  Continue fu with cardiology    6. Essential hypertension  Refill meds  Intermittent ambulatory BP checks    7.  Hyperlipidemia, unspecified hyperlipidemia type  continue Lipitor   Reviewed appr diet mg      Orders Placed This Encounter   Procedures    [facility] DIGITAL SCREEN W OR WO CAD BILATERAL     Standing Status:   Future     Standing Expiration Date:   11/18/2019    INFLUENZA, QUADV, 3 YRS AND OLDER, IM, PF, PREFILL SYR OR SDV, 0.5ML (Keyla Escalona, PF)   Zay Baugh MD     Referral Priority:   Routine     Referral Type:   Consult for Advice and Opinion     Referral Reason:   Specialty Services Required     Referred to Provider:   Shana Lunsford MD     Requested Specialty:   Obstetrics & Gynecology     Number of Visits Requested:   1    POCT Glucose    POCT Urinalysis no Micro     Current Outpatient Prescriptions   Medication Sig Dispense Refill    FLUoxetine (PROZAC) 20 MG capsule Take 1 capsule by mouth daily 30 capsule 2    albuterol sulfate HFA (PROAIR HFA)

## 2018-09-28 ENCOUNTER — TELEPHONE (OUTPATIENT)
Dept: FAMILY MEDICINE CLINIC | Age: 53
End: 2018-09-28

## 2018-09-28 NOTE — TELEPHONE ENCOUNTER
Called  ophthalmology spoke with Armando Srinivasan. They are not accepting referrals from PCP outside of the [address]. They are unable to accept a referral from us. Spoke to patient and gave her some more names. Patient will check [employer] and see if in network and call back with name. Placed referral in sytem but will need to be updated with doctor's name.

## 2018-09-28 NOTE — TELEPHONE ENCOUNTER
Pt called stating that [facility] no longer accepts her insurance and needs a referral to  Opthamology  For her diabetic retinopathy. Phone # [phone], fax # [phone].

## 2018-10-01 ENCOUNTER — TELEPHONE (OUTPATIENT)
Dept: FAMILY MEDICINE CLINIC | Age: 53
End: 2018-10-01

## 2018-10-08 RX ORDER — PEN NEEDLE, DIABETIC 32GX 5/32"
NEEDLE, DISPOSABLE MISCELLANEOUS
Qty: 100 EACH | Refills: 0 | Status: SHIPPED | OUTPATIENT
Start: 2018-10-08 | End: 2018-11-14 | Stop reason: SDUPTHER

## 2018-11-14 RX ORDER — PEN NEEDLE, DIABETIC 30 GX3/16"
NEEDLE, DISPOSABLE MISCELLANEOUS
Qty: 100 EACH | Refills: 0 | Status: SHIPPED | OUTPATIENT
Start: 2018-11-14 | End: 2021-11-16 | Stop reason: ALTCHOICE

## 2018-12-07 RX ORDER — FLUOXETINE HYDROCHLORIDE 20 MG/1
20 CAPSULE ORAL DAILY
Qty: 30 CAPSULE | Refills: 2 | OUTPATIENT
Start: 2018-12-07

## 2018-12-07 RX ORDER — FLUOXETINE HYDROCHLORIDE 20 MG/1
20 CAPSULE ORAL DAILY
Qty: 7 CAPSULE | Refills: 0 | Status: SHIPPED | OUTPATIENT
Start: 2018-12-07 | End: 2019-01-22 | Stop reason: SDUPTHER

## 2018-12-13 ENCOUNTER — TELEPHONE (OUTPATIENT)
Dept: FAMILY MEDICINE CLINIC | Age: 53
End: 2018-12-13

## 2019-01-22 ENCOUNTER — OFFICE VISIT (OUTPATIENT)
Dept: FAMILY MEDICINE CLINIC | Age: 54
End: 2019-01-22
Payer: COMMERCIAL

## 2019-01-22 VITALS
BODY MASS INDEX: 32.61 KG/M2 | TEMPERATURE: 98.6 F | RESPIRATION RATE: 16 BRPM | HEIGHT: 62 IN | WEIGHT: 177.2 LBS | OXYGEN SATURATION: 99 % | DIASTOLIC BLOOD PRESSURE: 72 MMHG | HEART RATE: 62 BPM | SYSTOLIC BLOOD PRESSURE: 124 MMHG

## 2019-01-22 DIAGNOSIS — I10 ESSENTIAL HYPERTENSION: ICD-10-CM

## 2019-01-22 DIAGNOSIS — E78.00 PURE HYPERCHOLESTEROLEMIA: ICD-10-CM

## 2019-01-22 DIAGNOSIS — F43.9 SITUATIONAL STRESS: ICD-10-CM

## 2019-01-22 DIAGNOSIS — F32.A DEPRESSION, UNSPECIFIED DEPRESSION TYPE: ICD-10-CM

## 2019-01-22 DIAGNOSIS — T14.8XXA HEMATOMA: ICD-10-CM

## 2019-01-22 DIAGNOSIS — F07.81 POST CONCUSSION SYNDROME: ICD-10-CM

## 2019-01-22 DIAGNOSIS — I25.10 CORONARY ARTERY DISEASE INVOLVING NATIVE CORONARY ARTERY OF NATIVE HEART WITHOUT ANGINA PECTORIS: ICD-10-CM

## 2019-01-22 DIAGNOSIS — I10 ESSENTIAL HYPERTENSION: Primary | ICD-10-CM

## 2019-01-22 DIAGNOSIS — E10.69 TYPE 1 DIABETES MELLITUS WITH OTHER SPECIFIED COMPLICATION (HCC): ICD-10-CM

## 2019-01-22 LAB
A/G RATIO: 1.6 (ref 1.1–2.2)
ALBUMIN SERPL-MCNC: 4.6 G/DL (ref 3.4–5)
ALP BLD-CCNC: 111 U/L (ref 40–129)
ALT SERPL-CCNC: 20 U/L (ref 10–40)
ANION GAP SERPL CALCULATED.3IONS-SCNC: 14 MMOL/L (ref 3–16)
AST SERPL-CCNC: 23 U/L (ref 15–37)
BASOPHILS ABSOLUTE: 0.1 K/UL (ref 0–0.2)
BASOPHILS RELATIVE PERCENT: 1.2 %
BILIRUB SERPL-MCNC: 0.3 MG/DL (ref 0–1)
BUN BLDV-MCNC: 13 MG/DL (ref 7–20)
CALCIUM SERPL-MCNC: 9.8 MG/DL (ref 8.3–10.6)
CHLORIDE BLD-SCNC: 100 MMOL/L (ref 99–110)
CHOLESTEROL, TOTAL: 215 MG/DL (ref 0–199)
CO2: 26 MMOL/L (ref 21–32)
CREAT SERPL-MCNC: 0.7 MG/DL (ref 0.6–1.1)
EOSINOPHILS ABSOLUTE: 0.2 K/UL (ref 0–0.6)
EOSINOPHILS RELATIVE PERCENT: 3.8 %
GFR AFRICAN AMERICAN: >60
GFR NON-AFRICAN AMERICAN: >60
GLOBULIN: 2.9 G/DL
GLUCOSE BLD-MCNC: 71 MG/DL (ref 70–99)
HCT VFR BLD CALC: 37.3 % (ref 36–48)
HDLC SERPL-MCNC: 82 MG/DL (ref 40–60)
HEMOGLOBIN: 12.6 G/DL (ref 12–16)
LDL CHOLESTEROL CALCULATED: 123 MG/DL
LYMPHOCYTES ABSOLUTE: 1.6 K/UL (ref 1–5.1)
LYMPHOCYTES RELATIVE PERCENT: 25.5 %
MCH RBC QN AUTO: 30.6 PG (ref 26–34)
MCHC RBC AUTO-ENTMCNC: 33.8 G/DL (ref 31–36)
MCV RBC AUTO: 90.6 FL (ref 80–100)
MONOCYTES ABSOLUTE: 0.5 K/UL (ref 0–1.3)
MONOCYTES RELATIVE PERCENT: 8.1 %
NEUTROPHILS ABSOLUTE: 4 K/UL (ref 1.7–7.7)
NEUTROPHILS RELATIVE PERCENT: 61.4 %
PDW BLD-RTO: 13.1 % (ref 12.4–15.4)
PLATELET # BLD: 381 K/UL (ref 135–450)
PMV BLD AUTO: 8 FL (ref 5–10.5)
POTASSIUM SERPL-SCNC: 4.5 MMOL/L (ref 3.5–5.1)
RBC # BLD: 4.12 M/UL (ref 4–5.2)
SODIUM BLD-SCNC: 140 MMOL/L (ref 136–145)
TOTAL CK: 57 U/L (ref 26–192)
TOTAL PROTEIN: 7.5 G/DL (ref 6.4–8.2)
TRIGL SERPL-MCNC: 49 MG/DL (ref 0–150)
VLDLC SERPL CALC-MCNC: 10 MG/DL
WBC # BLD: 6.4 K/UL (ref 4–11)

## 2019-01-22 PROCEDURE — 2022F DILAT RTA XM EVC RTNOPTHY: CPT | Performed by: CLINICAL NURSE SPECIALIST

## 2019-01-22 PROCEDURE — 3017F COLORECTAL CA SCREEN DOC REV: CPT | Performed by: CLINICAL NURSE SPECIALIST

## 2019-01-22 PROCEDURE — G8417 CALC BMI ABV UP PARAM F/U: HCPCS | Performed by: CLINICAL NURSE SPECIALIST

## 2019-01-22 PROCEDURE — G8598 ASA/ANTIPLAT THER USED: HCPCS | Performed by: CLINICAL NURSE SPECIALIST

## 2019-01-22 PROCEDURE — G8427 DOCREV CUR MEDS BY ELIG CLIN: HCPCS | Performed by: CLINICAL NURSE SPECIALIST

## 2019-01-22 PROCEDURE — G8482 FLU IMMUNIZE ORDER/ADMIN: HCPCS | Performed by: CLINICAL NURSE SPECIALIST

## 2019-01-22 PROCEDURE — 99214 OFFICE O/P EST MOD 30 MIN: CPT | Performed by: CLINICAL NURSE SPECIALIST

## 2019-01-22 PROCEDURE — 3045F PR MOST RECENT HEMOGLOBIN A1C LEVEL 7.0-9.0%: CPT | Performed by: CLINICAL NURSE SPECIALIST

## 2019-01-22 PROCEDURE — 1036F TOBACCO NON-USER: CPT | Performed by: CLINICAL NURSE SPECIALIST

## 2019-01-22 RX ORDER — FLUOXETINE HYDROCHLORIDE 20 MG/1
20 CAPSULE ORAL DAILY
Qty: 90 CAPSULE | Refills: 0 | Status: SHIPPED | OUTPATIENT
Start: 2019-01-22 | End: 2019-04-15 | Stop reason: SDUPTHER

## 2019-01-22 ASSESSMENT — ENCOUNTER SYMPTOMS
BLURRED VISION: 1
NAUSEA: 0
CONSTIPATION: 0
CHEST TIGHTNESS: 0
VOMITING: 0
COUGH: 0
DIARRHEA: 0
WHEEZING: 0
SHORTNESS OF BREATH: 0
ABDOMINAL PAIN: 0

## 2019-01-23 LAB
ESTIMATED AVERAGE GLUCOSE: 200.1 MG/DL
HBA1C MFR BLD: 8.6 %

## 2019-01-30 ENCOUNTER — TELEPHONE (OUTPATIENT)
Dept: FAMILY MEDICINE CLINIC | Age: 54
End: 2019-01-30

## 2019-02-17 ASSESSMENT — ENCOUNTER SYMPTOMS
ORTHOPNEA: 0
VISUAL CHANGE: 0

## 2019-04-10 RX ORDER — PEN NEEDLE, DIABETIC 30 GX3/16"
NEEDLE, DISPOSABLE MISCELLANEOUS
Qty: 90 EACH | Refills: 0 | Status: SHIPPED | OUTPATIENT
Start: 2019-04-10 | End: 2019-12-06 | Stop reason: SDUPTHER

## 2019-04-10 NOTE — TELEPHONE ENCOUNTER
Requested Prescriptions     Pending Prescriptions Disp Refills    Insulin Pen Needle (PEN NEEDLES) 32G X 4 MM MISC [Pharmacy Med Name: Amy Aragon PEN NEEDLES 32G 4MM]  0     Sig: ONE NEEDLE - DAILY WITH BASAGLAR     LV: 01/22/2019  NV: 04/15/2019

## 2019-04-15 ENCOUNTER — OFFICE VISIT (OUTPATIENT)
Dept: FAMILY MEDICINE CLINIC | Age: 54
End: 2019-04-15
Payer: COMMERCIAL

## 2019-04-15 VITALS
WEIGHT: 178.2 LBS | RESPIRATION RATE: 16 BRPM | OXYGEN SATURATION: 99 % | TEMPERATURE: 98.1 F | HEART RATE: 57 BPM | SYSTOLIC BLOOD PRESSURE: 122 MMHG | DIASTOLIC BLOOD PRESSURE: 68 MMHG | BODY MASS INDEX: 32.59 KG/M2

## 2019-04-15 DIAGNOSIS — F32.A DEPRESSION, UNSPECIFIED DEPRESSION TYPE: ICD-10-CM

## 2019-04-15 DIAGNOSIS — I25.10 CORONARY ARTERY DISEASE INVOLVING NATIVE CORONARY ARTERY OF NATIVE HEART WITHOUT ANGINA PECTORIS: ICD-10-CM

## 2019-04-15 DIAGNOSIS — E78.5 HYPERLIPIDEMIA, UNSPECIFIED HYPERLIPIDEMIA TYPE: ICD-10-CM

## 2019-04-15 DIAGNOSIS — I10 ESSENTIAL HYPERTENSION: ICD-10-CM

## 2019-04-15 LAB
A/G RATIO: 1.6 (ref 1.1–2.2)
ALBUMIN SERPL-MCNC: 4.2 G/DL (ref 3.4–5)
ALP BLD-CCNC: 94 U/L (ref 40–129)
ALT SERPL-CCNC: 17 U/L (ref 10–40)
ANION GAP SERPL CALCULATED.3IONS-SCNC: 11 MMOL/L (ref 3–16)
AST SERPL-CCNC: 19 U/L (ref 15–37)
BASOPHILS ABSOLUTE: 0.1 K/UL (ref 0–0.2)
BASOPHILS RELATIVE PERCENT: 1.2 %
BILIRUB SERPL-MCNC: 0.4 MG/DL (ref 0–1)
BILIRUBIN, POC: NEGATIVE
BLOOD URINE, POC: NEGATIVE
BUN BLDV-MCNC: 11 MG/DL (ref 7–20)
CALCIUM SERPL-MCNC: 9.2 MG/DL (ref 8.3–10.6)
CHLORIDE BLD-SCNC: 101 MMOL/L (ref 99–110)
CHOLESTEROL, TOTAL: 145 MG/DL (ref 0–199)
CHP ED QC CHECK: NORMAL
CLARITY, POC: CLEAR
CO2: 27 MMOL/L (ref 21–32)
COLOR, POC: YELLOW
CREAT SERPL-MCNC: 0.7 MG/DL (ref 0.6–1.1)
EOSINOPHILS ABSOLUTE: 0.2 K/UL (ref 0–0.6)
EOSINOPHILS RELATIVE PERCENT: 4.5 %
GFR AFRICAN AMERICAN: >60
GFR NON-AFRICAN AMERICAN: >60
GLOBULIN: 2.6 G/DL
GLUCOSE BLD-MCNC: 134 MG/DL
GLUCOSE BLD-MCNC: 138 MG/DL (ref 70–99)
GLUCOSE URINE, POC: NEGATIVE
HCT VFR BLD CALC: 37.2 % (ref 36–48)
HDLC SERPL-MCNC: 65 MG/DL (ref 40–60)
HEMOGLOBIN: 12.3 G/DL (ref 12–16)
KETONES, POC: NEGATIVE
LDL CHOLESTEROL CALCULATED: 68 MG/DL
LEUKOCYTE EST, POC: NEGATIVE
LYMPHOCYTES ABSOLUTE: 1.6 K/UL (ref 1–5.1)
LYMPHOCYTES RELATIVE PERCENT: 29.6 %
MCH RBC QN AUTO: 29.4 PG (ref 26–34)
MCHC RBC AUTO-ENTMCNC: 33 G/DL (ref 31–36)
MCV RBC AUTO: 88.9 FL (ref 80–100)
MONOCYTES ABSOLUTE: 0.4 K/UL (ref 0–1.3)
MONOCYTES RELATIVE PERCENT: 8 %
NEUTROPHILS ABSOLUTE: 3.1 K/UL (ref 1.7–7.7)
NEUTROPHILS RELATIVE PERCENT: 56.7 %
NITRITE, POC: NEGATIVE
PDW BLD-RTO: 12.9 % (ref 12.4–15.4)
PH, POC: 6
PLATELET # BLD: 366 K/UL (ref 135–450)
PMV BLD AUTO: 8.6 FL (ref 5–10.5)
POTASSIUM SERPL-SCNC: 4.8 MMOL/L (ref 3.5–5.1)
PROTEIN, POC: 30
RBC # BLD: 4.18 M/UL (ref 4–5.2)
SODIUM BLD-SCNC: 139 MMOL/L (ref 136–145)
SPECIFIC GRAVITY, POC: >=1.03
TOTAL CK: 52 U/L (ref 26–192)
TOTAL PROTEIN: 6.8 G/DL (ref 6.4–8.2)
TRIGL SERPL-MCNC: 59 MG/DL (ref 0–150)
UROBILINOGEN, POC: 0.2
VLDLC SERPL CALC-MCNC: 12 MG/DL
WBC # BLD: 5.6 K/UL (ref 4–11)

## 2019-04-15 PROCEDURE — 1036F TOBACCO NON-USER: CPT | Performed by: FAMILY MEDICINE

## 2019-04-15 PROCEDURE — 2022F DILAT RTA XM EVC RTNOPTHY: CPT | Performed by: FAMILY MEDICINE

## 2019-04-15 PROCEDURE — 3045F PR MOST RECENT HEMOGLOBIN A1C LEVEL 7.0-9.0%: CPT | Performed by: FAMILY MEDICINE

## 2019-04-15 PROCEDURE — 3017F COLORECTAL CA SCREEN DOC REV: CPT | Performed by: FAMILY MEDICINE

## 2019-04-15 PROCEDURE — 82962 GLUCOSE BLOOD TEST: CPT | Performed by: FAMILY MEDICINE

## 2019-04-15 PROCEDURE — G8417 CALC BMI ABV UP PARAM F/U: HCPCS | Performed by: FAMILY MEDICINE

## 2019-04-15 PROCEDURE — 81002 URINALYSIS NONAUTO W/O SCOPE: CPT | Performed by: FAMILY MEDICINE

## 2019-04-15 PROCEDURE — G8598 ASA/ANTIPLAT THER USED: HCPCS | Performed by: FAMILY MEDICINE

## 2019-04-15 PROCEDURE — 99214 OFFICE O/P EST MOD 30 MIN: CPT | Performed by: FAMILY MEDICINE

## 2019-04-15 PROCEDURE — G8427 DOCREV CUR MEDS BY ELIG CLIN: HCPCS | Performed by: FAMILY MEDICINE

## 2019-04-15 RX ORDER — PEN NEEDLE, DIABETIC 30 GX3/16"
NEEDLE, DISPOSABLE MISCELLANEOUS
Qty: 100 EACH | Refills: 0 | Status: SHIPPED | OUTPATIENT
Start: 2019-04-15 | End: 2019-07-08 | Stop reason: SDUPTHER

## 2019-04-15 RX ORDER — SYRINGE AND NEEDLE,INSULIN,1ML 31 GX5/16"
1 SYRINGE, EMPTY DISPOSABLE MISCELLANEOUS 2 TIMES DAILY
Qty: 100 EACH | Refills: 2 | Status: SHIPPED | OUTPATIENT
Start: 2019-04-15 | End: 2019-05-03 | Stop reason: SDUPTHER

## 2019-04-15 RX ORDER — FLUOXETINE HYDROCHLORIDE 20 MG/1
20 CAPSULE ORAL DAILY
Qty: 90 CAPSULE | Refills: 0 | Status: SHIPPED | OUTPATIENT
Start: 2019-04-15 | End: 2020-06-15

## 2019-04-15 RX ORDER — LANCETS 28 GAUGE
1 EACH MISCELLANEOUS 2 TIMES DAILY
Qty: 100 EACH | Refills: 2 | Status: SHIPPED | OUTPATIENT
Start: 2019-04-15 | End: 2019-05-03 | Stop reason: SDUPTHER

## 2019-04-16 LAB
ESTIMATED AVERAGE GLUCOSE: 203 MG/DL
HBA1C MFR BLD: 8.7 %

## 2019-04-20 ASSESSMENT — ENCOUNTER SYMPTOMS
CHEST TIGHTNESS: 0
SHORTNESS OF BREATH: 0
ABDOMINAL PAIN: 0
BLOOD IN STOOL: 0
COUGH: 0

## 2019-04-20 NOTE — PROGRESS NOTES
SUBJECTIVE:  More FERNANDEZ [facility]   1965   female   No Known Allergies    Chief Complaint   Patient presents with    Diabetes    Hyperlipidemia    Hypertension        Patient Active Problem List    Diagnosis Date Noted    Diabetes mellitus type 2 in obese ([address] 01/31/2017    Angina effort ([address] 11/09/2015    CAD S/P percutaneous coronary angioplasty 11/09/2015    Coronary artery disease involving native coronary artery of native heart without angina pectoris 10/29/2015     Overview:   ang mLAD + pLCX st'd diffuse D + dLCX dz  '03  ang dRCAst'd LCA same '05  dRCA 35%ISR LCA same EF 60  5/06  MPI nml EF58  8/07      Mild intermittent asthma without complication 42/44/2456    Type 1 diabetes mellitus ([facility]      IDDM      Diabetic retinopathy ([facility]     Asthma     Irritable bowel syndrome     Dysthymia 04/21/2014    HTN (hypertension) 04/21/2014    IDDM (insulin dependent diabetes mellitus) ([facility] 04/21/2014    CAD (coronary artery disease) 04/21/2014    Hyperlipidemia 04/21/2014    Dyslipidemia 10/28/2012       HPI   Pt is here today for fu on hyperlipidemia, HTN, CAD, IDDM and depression. She has been stable on current mgt. Denies CP,SOB or myalgias. No ha,change in vision or neurologic sx. Last HgA1c 8.6. Reports she has not been adhering to a very good diet. Denies depression or anxiety. Sleeping well. She is followed by cardiology once a yr. Has not had any trouble with asthma.   Past Medical History:   Diagnosis Date    Asthma     CAD (coronary artery disease)     Depression     Diabetes ([facility]     IDDM    Diabetic retinopathy ([facility]     Hyperlipidemia     Hypertension     Irritable bowel syndrome      Social History     Socioeconomic History    Marital status: Single     Spouse name: Not on file    Number of children: Not on file    Years of education: Not on file    Highest education level: Not on file   Occupational History    Not on file   Social Needs    Financial resource strain: Not on file    Food insecurity:     Worry: Not on file     Inability: Not on file    Transportation needs:     Medical: Not on file     Non-medical: Not on file   Tobacco Use    Smoking status: Former Smoker     Last attempt to quit: 2002     Years since quittin.0    Smokeless tobacco: Never Used   Substance and Sexual Activity    Alcohol use: No     Alcohol/week: 0.0 oz    Drug use: No    Sexual activity: Not on file   Lifestyle    Physical activity:     Days per week: Not on file     Minutes per session: Not on file    Stress: Not on file   Relationships    Social connections:     Talks on phone: Not on file     Gets together: Not on file     Attends Yazidism service: Not on file     Active member of club or organization: Not on file     Attends meetings of clubs or organizations: Not on file     Relationship status: Not on file    Intimate partner violence:     Fear of current or ex partner: Not on file     Emotionally abused: Not on file     Physically abused: Not on file     Forced sexual activity: Not on file   Other Topics Concern    Not on file   Social History Narrative    Lives by herself     Family History   Problem Relation Age of Onset    Depression Mother     Heart Disease Mother     Depression Maternal Grandmother     Heart Disease Maternal Grandmother     Heart Disease Maternal Grandfather     Heart Disease Paternal Aunt         Review of Systems   Constitutional: Negative for activity change, appetite change and unexpected weight change. Respiratory: Negative for cough, chest tightness and shortness of breath. Cardiovascular: Negative for chest pain, palpitations and leg swelling. Gastrointestinal: Negative for abdominal pain and blood in stool. Musculoskeletal: Negative for arthralgias and myalgias. Skin: Negative for rash. Neurological: Negative for light-headedness and headaches. Hematological: Negative for adenopathy. Does not bruise/bleed easily. Psychiatric/Behavioral: Negative for dysphoric mood, sleep disturbance and suicidal ideas. The patient is not nervous/anxious. OBJECTIVE:  /68 (Site: Right Upper Arm, Position: Sitting, Cuff Size: Medium Adult)   Pulse 57   Temp 98.1 °F (36.7 °C) (Oral)   Resp 16   Wt 178 lb 3.2 oz (80.8 kg)   LMP 08/26/2016   SpO2 99%   BMI 32.59 kg/m²   Physical Exam   Constitutional: She is oriented to person, place, and time. She appears well-developed and well-nourished. Eyes: Pupils are equal, round, and reactive to light. EOM are normal.   Neck: Normal range of motion. Neck supple. No JVD present. No thyromegaly present. Cardiovascular: Normal rate and regular rhythm. Pulmonary/Chest: Effort normal and breath sounds normal.   Abdominal: Soft. Bowel sounds are normal. There is no tenderness. Neurological: She is alert and oriented to person, place, and time. Skin: No rash noted. Psychiatric: She has a normal mood and affect. Her behavior is normal. Thought content normal.   Nursing note and vitals reviewed. ASSESSMENT/PLAN:    1. IDDM (insulin dependent diabetes mellitus) ([facility]  Continue current mgt  Discussed appr diet and increase physical activity  Labs  Encouraged regular ambulatory BS checks  Diabetic eye exam  - POCT Glucose  - POCT Urinalysis no Micro  - insulin aspart (NOVOLOG) 100 UNIT/ML injection vial; Inject 5 Units into the skin 3 times daily (before meals)  Dispense: 1 vial; Refill: 1  - insulin glargine (LANTUS SOLOSTAR) 100 UNIT/ML injection pen; Inject 24 Units into the skin nightly  Dispense: 30 pen; Refill: 0  - Hemoglobin A1C; Future    2. Depression, unspecified depression type    - FLUoxetine (PROZAC) 20 MG capsule; Take 1 capsule by mouth daily  Dispense: 90 capsule; Refill: 0    3. Hyperlipidemia, unspecified hyperlipidemia type  Refill Lipitor  Labs  Diet mgt  - Comprehensive Metabolic Panel; Future  - CK;  Future  - CBC Auto Differential; Future  - Lipid Panel; Future    4. Coronary artery disease involving native coronary artery of native heart without angina pectoris  Continue fu with cardiology as advised    5.  Essential hypertension  Refill Lisinopril  Intermittent ambulatory BP checks      Orders Placed This Encounter   Procedures    Comprehensive Metabolic Panel     Standing Status:   Future     Number of Occurrences:   1     Standing Expiration Date:   5/4/2020    CK     Standing Status:   Future     Number of Occurrences:   1     Standing Expiration Date:   5/4/2020    CBC Auto Differential     Standing Status:   Future     Number of Occurrences:   1     Standing Expiration Date:   5/4/2020    Lipid Panel     Standing Status:   Future     Number of Occurrences:   1     Standing Expiration Date:   5/4/2020     Order Specific Question:   Is Patient Fasting?/# of Hours     Answer:   10 hrs    Hemoglobin A1C     Standing Status:   Future     Number of Occurrences:   1     Standing Expiration Date:   5/4/2020    POCT Glucose    POCT Urinalysis no Micro     Current Outpatient Medications   Medication Sig Dispense Refill    FLUoxetine (PROZAC) 20 MG capsule Take 1 capsule by mouth daily 90 capsule 0    insulin aspart (NOVOLOG) 100 UNIT/ML injection vial Inject 5 Units into the skin 3 times daily (before meals) 1 vial 1    insulin glargine (LANTUS SOLOSTAR) 100 UNIT/ML injection pen Inject 24 Units into the skin nightly 30 pen 0    COMFORT ASSIST INSULIN SYRINGE 31G X 5/16\" 1 ML MISC Inject 1 each into the skin 2 times daily 100 each 2    FREESTYLE LANCETS MISC Inject 1 each into the skin 2 times daily 100 each 2    Insulin Pen Needle (PEN NEEDLES) 32G X 4 MM MISC USE ONE NEW NEEDLE DAILY WITH BASAGLAR 100 each 0    Insulin Pen Needle (PEN NEEDLES) 32G X 4 MM MISC ONE NEEDLE - DAILY WITH BASAGLAR 90 each 0    blood glucose test strips (FREESTYLE LITE) strip 4 times a day and as needed 100 each 2    Insulin Pen Needle (PEN NEEDLES) 32G X 4 MM MISC ONE NEEDLE - DAILY WITH BASAGLAR 100 each 0    albuterol sulfate HFA (PROAIR HFA) 108 (90 Base) MCG/ACT inhaler Inhale 2 puffs into the lungs every 6 hours as needed for Wheezing 1 Inhaler 0    naproxen sodium (ANAPROX DS) 550 MG tablet Take 1 tablet by mouth 2 times daily (with meals) 30 tablet 0    insulin glargine (BASAGLAR KWIKPEN) 100 UNIT/ML injection pen INJECT 24 UNITS INTO THE SKIN NIGHTLY 10 pen 1    Insulin Syringe-Needle U-100 (B-D INS SYR ULTRAFINE 1CC/31G) 31G X 5/16\" 1 ML MISC Inject 1 each into the skin 3 times daily 150 each 1    blood glucose test strips (ASCENSIA AUTODISC VI;ONE TOUCH ULTRA TEST VI) strip 1 each by In Vitro route 6 times daily The patient tests six times daily. She uses the freestyle strips 180 each 2    Insulin Pen Needle (PEN NEEDLES) 32G X 4 MM MISC USE ONE NEW NEEDLE DAILY WITH BASAGLAR 100 each 0    Blood Glucose Monitoring Suppl (FREESTYLE LITE) QUAN Inject 1 Device into the skin 4 times daily 1 Device 0    baclofen (LIORESAL) 10 MG tablet TK 1 T PO TID PRF MUSCLE SPASMS  0    polyethylene glycol (GLYCOLAX) powder MIX 17 GRAMS IN LIQUID OF CHOICE AND TK ONCE D PRF CONSTIPATION  1    nitroGLYCERIN (NITROSTAT) 0.3 MG SL tablet       atorvastatin (LIPITOR) 80 MG tablet       lisinopril (PRINIVIL;ZESTRIL) 20 MG tablet Take 20 mg by mouth      metoprolol (TOPROL-XL) 50 MG XL tablet Take 50 mg by mouth      amLODIPine (NORVASC) 5 MG tablet Take 5 mg by mouth      Blood Glucose Monitoring Suppl (ONE TOUCH ULTRA MINI) W/DEVICE KIT 1 kit by Does not apply route daily as needed 1 kit 0    Tuberculin-Allergy Syringes (B-D PLASTIPAK SYRINGES ALLERGY) 28G X 1/2\" 1 ML MISC 1 each by Does not apply route daily as needed 50 each 1    Insulin NPH Isophane & Regular (HUMULIN 70/30 PEN) (70-30) 100 UNIT/ML injection pen Inject 25 Units into the skin      Glucose Blood (GLUCOMETER ELITE TEST VI) by In Vitro route. The patient needs the One Touch Ultra Glucometer.   Would not come up in epic.      aspirin 81 MG tablet Take 81 mg by mouth daily.  clopidogrel (PLAVIX) 75 MG tablet Take 75 mg by mouth daily. No current facility-administered medications for this visit. Pap and GYN exam advised  Shingrix advised  Mammogram advised   Return in about 3 months (around 7/15/2019), or if symptoms worsen or fail to improve.     Lazaro Scanlon MD

## 2019-05-06 ENCOUNTER — TELEPHONE (OUTPATIENT)
Dept: INTERNAL MEDICINE CLINIC | Age: 54
End: 2019-05-06

## 2019-05-06 NOTE — TELEPHONE ENCOUNTER
PA SUBMITTED FOR Comfort EZ Insulin Syringe 31G X 5/16\"0.3 ML XX MISC  Key: [id number] - PA Case ID: [id number] - Rx #: [id number]  STATUS: PENDING

## 2019-06-17 ENCOUNTER — OFFICE VISIT (OUTPATIENT)
Dept: FAMILY MEDICINE CLINIC | Age: 54
End: 2019-06-17
Payer: COMMERCIAL

## 2019-06-17 VITALS
DIASTOLIC BLOOD PRESSURE: 72 MMHG | WEIGHT: 175 LBS | OXYGEN SATURATION: 98 % | BODY MASS INDEX: 32.01 KG/M2 | SYSTOLIC BLOOD PRESSURE: 136 MMHG | TEMPERATURE: 98.1 F | HEART RATE: 64 BPM

## 2019-06-17 DIAGNOSIS — I25.10 CORONARY ARTERY DISEASE INVOLVING NATIVE CORONARY ARTERY OF NATIVE HEART WITHOUT ANGINA PECTORIS: ICD-10-CM

## 2019-06-17 DIAGNOSIS — E10.9 TYPE 1 DIABETES MELLITUS WITHOUT COMPLICATION (HCC): ICD-10-CM

## 2019-06-17 DIAGNOSIS — R10.11 RIGHT UPPER QUADRANT ABDOMINAL PAIN: Primary | ICD-10-CM

## 2019-06-17 DIAGNOSIS — I10 ESSENTIAL HYPERTENSION: ICD-10-CM

## 2019-06-17 DIAGNOSIS — R53.83 FATIGUE, UNSPECIFIED TYPE: ICD-10-CM

## 2019-06-17 DIAGNOSIS — F32.A DEPRESSION, UNSPECIFIED DEPRESSION TYPE: ICD-10-CM

## 2019-06-17 PROCEDURE — G8598 ASA/ANTIPLAT THER USED: HCPCS | Performed by: FAMILY MEDICINE

## 2019-06-17 PROCEDURE — 99214 OFFICE O/P EST MOD 30 MIN: CPT | Performed by: FAMILY MEDICINE

## 2019-06-17 PROCEDURE — G8427 DOCREV CUR MEDS BY ELIG CLIN: HCPCS | Performed by: FAMILY MEDICINE

## 2019-06-17 PROCEDURE — 2022F DILAT RTA XM EVC RTNOPTHY: CPT | Performed by: FAMILY MEDICINE

## 2019-06-17 PROCEDURE — 3045F PR MOST RECENT HEMOGLOBIN A1C LEVEL 7.0-9.0%: CPT | Performed by: FAMILY MEDICINE

## 2019-06-17 PROCEDURE — G8417 CALC BMI ABV UP PARAM F/U: HCPCS | Performed by: FAMILY MEDICINE

## 2019-06-17 PROCEDURE — 1036F TOBACCO NON-USER: CPT | Performed by: FAMILY MEDICINE

## 2019-06-17 PROCEDURE — 3017F COLORECTAL CA SCREEN DOC REV: CPT | Performed by: FAMILY MEDICINE

## 2019-06-24 ASSESSMENT — ENCOUNTER SYMPTOMS
SHORTNESS OF BREATH: 0
ABDOMINAL PAIN: 0
NAUSEA: 0
DIARRHEA: 0
BLOOD IN STOOL: 0
VOMITING: 0
COUGH: 0
CONSTIPATION: 0
CHEST TIGHTNESS: 0

## 2019-06-25 NOTE — PROGRESS NOTES
SUBJECTIVE:  Eli FERNANDEZ [facility]   1965   female   No Known Allergies    Chief Complaint   Patient presents with    Abdominal Pain     upper abdominal pain seeing cardio july 2        Patient Active Problem List    Diagnosis Date Noted    Diabetes mellitus type 2 in obese ([address] 01/31/2017    Angina effort 11/09/2015     Replacing deprecated diagnoses      CAD S/P percutaneous coronary angioplasty 11/09/2015    Coronary artery disease involving native coronary artery of native heart without angina pectoris 10/29/2015     Overview:   ang mLAD + pLCX st'd diffuse D + dLCX dz  '03  ang dRCAst'd LCA same '05  dRCA 35%ISR LCA same EF 60  5/06  MPI nml EF58  8/07      Mild intermittent asthma without complication 87/15/8979    Type 1 diabetes mellitus ([address]      IDDM      Diabetic retinopathy ([address]     Asthma     Irritable bowel syndrome     Dysthymia 04/21/2014    HTN (hypertension) 04/21/2014    IDDM (insulin dependent diabetes mellitus) ([address] 04/21/2014    CAD (coronary artery disease) 04/21/2014    Hyperlipidemia 04/21/2014    Dyslipidemia 10/28/2012       HPI   Pt with hx of diabetes, HTN, CAD, and depression is here today co ongoing intermittent RUQ abdominal \"discomfort\". Reports she thinks sx can worsen with eating. Denies N/V or bowel changes. Good PO. No weight changes. She has had a gallbladder US which was negative in 2017 when co similar sx. Denies CP,SOB. She has been getting more tired during the day. Sleeping ok but waking up tired. She has contacted her cardiologist and has an appointment coming up. No cough or URI x. Last HgA1c was elevated at 8.7. Reports she has not been adhering to a very good diet. Little physical activity. Last LDL 68.   Past Medical History:   Diagnosis Date    Asthma     CAD (coronary artery disease)     Depression     Diabetes ([address]     IDDM    Diabetic retinopathy ([address]     Hyperlipidemia     Hypertension     Irritable bowel syndrome      Social History daily. No current facility-administered medications for this visit. Return if symptoms worsen or fail to improve.     Farzana Fair MD

## 2019-07-08 ENCOUNTER — OFFICE VISIT (OUTPATIENT)
Dept: FAMILY MEDICINE CLINIC | Age: 54
End: 2019-07-08
Payer: COMMERCIAL

## 2019-07-08 VITALS
TEMPERATURE: 99.1 F | HEART RATE: 60 BPM | DIASTOLIC BLOOD PRESSURE: 78 MMHG | OXYGEN SATURATION: 98 % | WEIGHT: 172.8 LBS | SYSTOLIC BLOOD PRESSURE: 124 MMHG | RESPIRATION RATE: 16 BRPM | BODY MASS INDEX: 31.61 KG/M2

## 2019-07-08 DIAGNOSIS — E78.5 HYPERLIPIDEMIA, UNSPECIFIED HYPERLIPIDEMIA TYPE: ICD-10-CM

## 2019-07-08 DIAGNOSIS — F32.A DEPRESSION, UNSPECIFIED DEPRESSION TYPE: ICD-10-CM

## 2019-07-08 DIAGNOSIS — M79.641 RIGHT HAND PAIN: Primary | ICD-10-CM

## 2019-07-08 DIAGNOSIS — I25.10 CORONARY ARTERY DISEASE INVOLVING NATIVE CORONARY ARTERY OF NATIVE HEART WITHOUT ANGINA PECTORIS: ICD-10-CM

## 2019-07-08 LAB
BILIRUBIN, POC: NORMAL
BLOOD URINE, POC: NORMAL
CHP ED QC CHECK: NORMAL
CLARITY, POC: CLEAR
COLOR, POC: YELLOW
GLUCOSE BLD-MCNC: 319 MG/DL
GLUCOSE URINE, POC: NORMAL
KETONES, POC: NORMAL
LEUKOCYTE EST, POC: NORMAL
NITRITE, POC: NORMAL
PH, POC: 5.5
PROTEIN, POC: NORMAL
SPECIFIC GRAVITY, POC: 1.02
UROBILINOGEN, POC: 0.2

## 2019-07-08 PROCEDURE — 3017F COLORECTAL CA SCREEN DOC REV: CPT | Performed by: FAMILY MEDICINE

## 2019-07-08 PROCEDURE — G8598 ASA/ANTIPLAT THER USED: HCPCS | Performed by: FAMILY MEDICINE

## 2019-07-08 PROCEDURE — 1036F TOBACCO NON-USER: CPT | Performed by: FAMILY MEDICINE

## 2019-07-08 PROCEDURE — 81002 URINALYSIS NONAUTO W/O SCOPE: CPT | Performed by: FAMILY MEDICINE

## 2019-07-08 PROCEDURE — G8428 CUR MEDS NOT DOCUMENT: HCPCS | Performed by: FAMILY MEDICINE

## 2019-07-08 PROCEDURE — G8417 CALC BMI ABV UP PARAM F/U: HCPCS | Performed by: FAMILY MEDICINE

## 2019-07-08 PROCEDURE — 2022F DILAT RTA XM EVC RTNOPTHY: CPT | Performed by: FAMILY MEDICINE

## 2019-07-08 PROCEDURE — 3045F PR MOST RECENT HEMOGLOBIN A1C LEVEL 7.0-9.0%: CPT | Performed by: FAMILY MEDICINE

## 2019-07-08 PROCEDURE — 82962 GLUCOSE BLOOD TEST: CPT | Performed by: FAMILY MEDICINE

## 2019-07-08 PROCEDURE — 99214 OFFICE O/P EST MOD 30 MIN: CPT | Performed by: FAMILY MEDICINE

## 2019-07-08 RX ORDER — SYRINGE AND NEEDLE,INSULIN,1ML 31 GX5/16"
1 SYRINGE, EMPTY DISPOSABLE MISCELLANEOUS 2 TIMES DAILY
Qty: 100 EACH | Refills: 2 | Status: SHIPPED | OUTPATIENT
Start: 2019-07-08 | End: 2019-10-07 | Stop reason: SDUPTHER

## 2019-07-08 RX ORDER — FLUOXETINE HYDROCHLORIDE 20 MG/1
20 CAPSULE ORAL DAILY
Qty: 30 CAPSULE | Refills: 2 | Status: SHIPPED | OUTPATIENT
Start: 2019-07-08 | End: 2019-10-07 | Stop reason: SDUPTHER

## 2019-07-08 RX ORDER — PEN NEEDLE, DIABETIC 30 GX3/16"
NEEDLE, DISPOSABLE MISCELLANEOUS
Qty: 90 EACH | Refills: 0 | Status: CANCELLED | OUTPATIENT
Start: 2019-07-08

## 2019-07-08 RX ORDER — PEN NEEDLE, DIABETIC 30 GX3/16"
NEEDLE, DISPOSABLE MISCELLANEOUS
Qty: 100 EACH | Refills: 0 | Status: SHIPPED | OUTPATIENT
Start: 2019-07-08 | End: 2019-09-10 | Stop reason: SDUPTHER

## 2019-07-14 ASSESSMENT — ENCOUNTER SYMPTOMS
SHORTNESS OF BREATH: 0
COUGH: 0
BLOOD IN STOOL: 0
CHEST TIGHTNESS: 0
ABDOMINAL PAIN: 0

## 2019-07-14 NOTE — PROGRESS NOTES
[facility]     Referral Priority:   Routine     Referral Type:   Eval and Treat     Referral Reason:   Specialty Services Required     Referred to Provider:   Chiara Purcell MD     Requested Specialty:   Orthopedic Surgery     Number of Visits Requested:   1    POCT Glucose    POCT Urinalysis no Micro     Current Outpatient Medications   Medication Sig Dispense Refill    insulin aspart (NOVOLOG) 100 UNIT/ML injection vial Inject 5 Units into the skin 3 times daily (before meals) 1 vial 1    Insulin Pen Needle (PEN NEEDLES) 32G X 4 MM MISC USE ONE NEW NEEDLE DAILY WITH BASAGLAR 100 each 0    insulin glargine (LANTUS SOLOSTAR) 100 UNIT/ML injection pen Inject 24 Units into the skin nightly 10 pen 0    FLUoxetine (PROZAC) 20 MG capsule Take 1 capsule by mouth daily 30 capsule 2    COMFORT ASSIST INSULIN SYRINGE 31G X 5/16\" 1 ML MISC Inject 1 each into the skin 2 times daily 100 each 2    blood glucose test strips (FREESTYLE LITE) strip USE ONE STRIP TO TEST FOUR TIMES A DAY AND AS NEEDED 100 strip 1    FREESTYLE LANCETS MISC Inject 1 each into the skin 2 times daily 100 each 2    FLUoxetine (PROZAC) 20 MG capsule Take 1 capsule by mouth daily 90 capsule 0    Insulin Pen Needle (PEN NEEDLES) 32G X 4 MM MISC ONE NEEDLE - DAILY WITH BASAGLAR 90 each 0    Insulin Pen Needle (PEN NEEDLES) 32G X 4 MM MISC ONE NEEDLE - DAILY WITH BASAGLAR 100 each 0    albuterol sulfate HFA (PROAIR HFA) 108 (90 Base) MCG/ACT inhaler Inhale 2 puffs into the lungs every 6 hours as needed for Wheezing 1 Inhaler 0    naproxen sodium (ANAPROX DS) 550 MG tablet Take 1 tablet by mouth 2 times daily (with meals) 30 tablet 0    insulin glargine (BASAGLAR KWIKPEN) 100 UNIT/ML injection pen INJECT 24 UNITS INTO THE SKIN NIGHTLY 10 pen 1    Insulin Syringe-Needle U-100 (B-D INS SYR ULTRAFINE 1CC/31G) 31G X 5/16\" 1 ML MISC Inject 1 each into the skin 3 times daily 150 each 1    blood glucose test strips (ASCENSIA AUTODISC VI;ONE TOUCH ULTRA TEST VI) strip 1 each by In Vitro route 6 times daily The patient tests six times daily. She uses the freestyle strips 180 each 2    Insulin Pen Needle (PEN NEEDLES) 32G X 4 MM MISC USE ONE NEW NEEDLE DAILY WITH BASAGLAR 100 each 0    Blood Glucose Monitoring Suppl (FREESTYLE LITE) QUAN Inject 1 Device into the skin 4 times daily 1 Device 0    baclofen (LIORESAL) 10 MG tablet TK 1 T PO TID PRF MUSCLE SPASMS  0    polyethylene glycol (GLYCOLAX) powder MIX 17 GRAMS IN LIQUID OF CHOICE AND TK ONCE D PRF CONSTIPATION  1    nitroGLYCERIN (NITROSTAT) 0.3 MG SL tablet       atorvastatin (LIPITOR) 80 MG tablet       lisinopril (PRINIVIL;ZESTRIL) 20 MG tablet Take 20 mg by mouth      metoprolol (TOPROL-XL) 50 MG XL tablet Take 50 mg by mouth      amLODIPine (NORVASC) 5 MG tablet Take 5 mg by mouth      Blood Glucose Monitoring Suppl (ONE TOUCH ULTRA MINI) W/DEVICE KIT 1 kit by Does not apply route daily as needed 1 kit 0    Tuberculin-Allergy Syringes (B-D PLASTIPAK SYRINGES ALLERGY) 28G X 1/2\" 1 ML MISC 1 each by Does not apply route daily as needed 50 each 1    Insulin NPH Isophane & Regular (HUMULIN 70/30 PEN) (70-30) 100 UNIT/ML injection pen Inject 25 Units into the skin      Glucose Blood (GLUCOMETER ELITE TEST VI) by In Vitro route. The patient needs the One Touch Ultra Glucometer. Would not come up in epic.  aspirin 81 MG tablet Take 81 mg by mouth daily.  clopidogrel (PLAVIX) 75 MG tablet Take 75 mg by mouth daily. No current facility-administered medications for this visit. pap and GYN exam advised  Shingrix advised  mammogram advised     Return in about 3 months (around 10/8/2019), or if symptoms worsen or fail to improve.     Delonte Pineda MD

## 2019-07-23 ENCOUNTER — OFFICE VISIT (OUTPATIENT)
Dept: ORTHOPEDIC SURGERY | Age: 54
End: 2019-07-23
Payer: COMMERCIAL

## 2019-07-23 VITALS — WEIGHT: 172.84 LBS | HEIGHT: 62 IN | BODY MASS INDEX: 31.81 KG/M2

## 2019-07-23 DIAGNOSIS — M65.341 TRIGGER FINGER, RIGHT RING FINGER: ICD-10-CM

## 2019-07-23 DIAGNOSIS — M79.642 LEFT HAND PAIN: ICD-10-CM

## 2019-07-23 DIAGNOSIS — M79.641 RIGHT HAND PAIN: Primary | ICD-10-CM

## 2019-07-23 PROCEDURE — G8427 DOCREV CUR MEDS BY ELIG CLIN: HCPCS | Performed by: ORTHOPAEDIC SURGERY

## 2019-07-23 PROCEDURE — G8417 CALC BMI ABV UP PARAM F/U: HCPCS | Performed by: ORTHOPAEDIC SURGERY

## 2019-07-23 PROCEDURE — 1036F TOBACCO NON-USER: CPT | Performed by: ORTHOPAEDIC SURGERY

## 2019-07-23 PROCEDURE — 3017F COLORECTAL CA SCREEN DOC REV: CPT | Performed by: ORTHOPAEDIC SURGERY

## 2019-07-23 PROCEDURE — G8598 ASA/ANTIPLAT THER USED: HCPCS | Performed by: ORTHOPAEDIC SURGERY

## 2019-07-23 PROCEDURE — 20550 NJX 1 TENDON SHEATH/LIGAMENT: CPT | Performed by: ORTHOPAEDIC SURGERY

## 2019-07-23 PROCEDURE — 99213 OFFICE O/P EST LOW 20 MIN: CPT | Performed by: ORTHOPAEDIC SURGERY

## 2019-07-23 NOTE — PROGRESS NOTES
swelling, fluctuance or additional skin changes throughout the right upper extremity        right ring finger                              moderate  tenderness at the A1 pulley                            moderate triggering with flexion and     extension. All other fingers/thumbs:   No tenderness at A1 pulley, no triggering with                                                   flexion/extension, and no locking    Neurological:  Sensation is subjectively normal in hands bilaterally with gross sensation intact to median ulnar and radial nerve without deficit        Radiology:     X-rays obtained and reviewed in office:  Views 3  Location right hand  Impression no acute fracture dislocation  Degenerative changes throughout multiple IP joints without destructive osseous abnormality    3 views of left hand:  No acute fracture, dislocation or additional acute osseous abnormality  Multiple mild degenerative changes throughout IP joints of all fingers    Additional Studies:    IMPRESSION AND PLAN:  60-year-old female presenting with history of right ring finger catching locking and pain as well as bilateral hand diffuse stiffness  1. Right ring finger trigger finger  2. 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 I discussed the entity of trigger finger with the patient. I fully outlined the mechanics behind the triggering and locking and appropriate conservative and surgical options. All their questions were answered fully. At this point the patient is symptomatic, and injection was recommended. The risks and benefits of steroid injection were discussed thoroughly. Risks discussed included infection, adverse drug reaction, increased pain post-injection, skin de-pigmentation, fatty atrophy, and persistent clinical symptoms despite injection.   The injection was

## 2019-08-21 ENCOUNTER — OFFICE VISIT (OUTPATIENT)
Dept: ORTHOPEDIC SURGERY | Age: 54
End: 2019-08-21
Payer: COMMERCIAL

## 2019-08-21 DIAGNOSIS — M65.332 TRIGGER FINGER, LEFT MIDDLE FINGER: ICD-10-CM

## 2019-08-21 DIAGNOSIS — M65.341 TRIGGER FINGER, RIGHT RING FINGER: Primary | ICD-10-CM

## 2019-08-21 PROCEDURE — G8598 ASA/ANTIPLAT THER USED: HCPCS | Performed by: ORTHOPAEDIC SURGERY

## 2019-08-21 PROCEDURE — 99213 OFFICE O/P EST LOW 20 MIN: CPT | Performed by: ORTHOPAEDIC SURGERY

## 2019-08-21 PROCEDURE — G8417 CALC BMI ABV UP PARAM F/U: HCPCS | Performed by: ORTHOPAEDIC SURGERY

## 2019-08-21 PROCEDURE — G8427 DOCREV CUR MEDS BY ELIG CLIN: HCPCS | Performed by: ORTHOPAEDIC SURGERY

## 2019-08-21 PROCEDURE — 20550 NJX 1 TENDON SHEATH/LIGAMENT: CPT | Performed by: ORTHOPAEDIC SURGERY

## 2019-08-21 PROCEDURE — 3017F COLORECTAL CA SCREEN DOC REV: CPT | Performed by: ORTHOPAEDIC SURGERY

## 2019-08-21 PROCEDURE — 1036F TOBACCO NON-USER: CPT | Performed by: ORTHOPAEDIC SURGERY

## 2019-08-21 NOTE — PROGRESS NOTES
.Injection administration details:  Date & Time: 8/21/19 10:39 AM  Site & Comments: LEFT LONG FINGER administered by Dr Earnest Borjas   CC# : 0.5  [facility]:[id number]   Lot number: [id number]  EXP: 09/20    Lidocaine 1%  CC# : 0.5  NDC: 9885-3233-96  Lot number: [id number]  EXP: 10/20

## 2019-08-21 NOTE — PROGRESS NOTES
Assessment: 60-year-old female presenting with history of right ring finger catching locking and pain as well as bilateral hand diffuse stiffness  1. Right ring finger trigger finger  2. Left long finger trigger finger  2. Suspect bilateral hand mild IP joint osteoarthritis  Treatment Plan: The patient has had excellent benefit currently after right ring finger trigger finger injection. Right now, we will continue with conservative management and observation for her right ring finger but if she does begin to experience the symptoms we would discuss surgical intervention versus repeat injection  For her left long finger, she has not had any formal treatment today. We did discuss options for treatment including injection versus surgical intervention. The patient would like to consider injection today. She is a diabetic and again we did discuss unique side effects including transient hyperglycemia today. The risks and benefits of steroid injection were discussed thoroughly. Risks discussed included infection, adverse drug reaction, increased pain post-injection, skin de-pigmentation, fatty atrophy, and persistent clinical symptoms despite injection. The injection was given after cleansing the skin with alcohol. The patients left long finger A1 pulley was prepped for steroid injection. Using sterile technique, the left long finger A1 pulley was injected with a mixture of 0.5 ml of 40mg/ml Kenalog and 0.5 mL 1% lidocaine. Appropriate post injection instructions were given. The patient tolerated the injection well and a Band-aid was placed. I have offered the patient follow-up in approximately 1 month for reevaluation of her symptoms and further discussion of treatment options    No follow-ups on file. History of Present Illness  Seferino Hogan is a 47 y.o. female here today for a follow-up for bilateral hands, specifically her right ring finger and left long finger trigger fingers.   She was seen a

## 2019-09-11 RX ORDER — PEN NEEDLE, DIABETIC 30 GX3/16"
NEEDLE, DISPOSABLE MISCELLANEOUS
Qty: 100 EACH | Refills: 0 | Status: SHIPPED | OUTPATIENT
Start: 2019-09-11 | End: 2019-10-07 | Stop reason: SDUPTHER

## 2019-10-07 ENCOUNTER — OFFICE VISIT (OUTPATIENT)
Dept: FAMILY MEDICINE CLINIC | Age: 54
End: 2019-10-07
Payer: COMMERCIAL

## 2019-10-07 VITALS
DIASTOLIC BLOOD PRESSURE: 60 MMHG | BODY MASS INDEX: 31.82 KG/M2 | OXYGEN SATURATION: 98 % | SYSTOLIC BLOOD PRESSURE: 130 MMHG | HEART RATE: 78 BPM | WEIGHT: 174 LBS

## 2019-10-07 DIAGNOSIS — I10 ESSENTIAL HYPERTENSION: ICD-10-CM

## 2019-10-07 DIAGNOSIS — Z78.0 MENOPAUSE: ICD-10-CM

## 2019-10-07 DIAGNOSIS — Z12.39 BREAST CANCER SCREENING: ICD-10-CM

## 2019-10-07 DIAGNOSIS — F32.A DEPRESSION, UNSPECIFIED DEPRESSION TYPE: ICD-10-CM

## 2019-10-07 DIAGNOSIS — I25.10 CORONARY ARTERY DISEASE INVOLVING NATIVE CORONARY ARTERY OF NATIVE HEART WITHOUT ANGINA PECTORIS: ICD-10-CM

## 2019-10-07 DIAGNOSIS — E78.5 HYPERLIPIDEMIA, UNSPECIFIED HYPERLIPIDEMIA TYPE: ICD-10-CM

## 2019-10-07 DIAGNOSIS — Z23 NEED FOR VACCINATION: ICD-10-CM

## 2019-10-07 LAB
BILIRUBIN, POC: ABNORMAL
BLOOD URINE, POC: ABNORMAL
CHP ED QC CHECK: NORMAL
CLARITY, POC: CLEAR
COLOR, POC: ABNORMAL
GLUCOSE BLD-MCNC: 80 MG/DL
GLUCOSE URINE, POC: 500
KETONES, POC: ABNORMAL
LEUKOCYTE EST, POC: ABNORMAL
NITRITE, POC: ABNORMAL
PH, POC: 7.5
PROTEIN, POC: 30
SPECIFIC GRAVITY, POC: 1.02
UROBILINOGEN, POC: 0.2

## 2019-10-07 PROCEDURE — 3017F COLORECTAL CA SCREEN DOC REV: CPT | Performed by: FAMILY MEDICINE

## 2019-10-07 PROCEDURE — 82962 GLUCOSE BLOOD TEST: CPT | Performed by: FAMILY MEDICINE

## 2019-10-07 PROCEDURE — 1036F TOBACCO NON-USER: CPT | Performed by: FAMILY MEDICINE

## 2019-10-07 PROCEDURE — G8427 DOCREV CUR MEDS BY ELIG CLIN: HCPCS | Performed by: FAMILY MEDICINE

## 2019-10-07 PROCEDURE — 3045F PR MOST RECENT HEMOGLOBIN A1C LEVEL 7.0-9.0%: CPT | Performed by: FAMILY MEDICINE

## 2019-10-07 PROCEDURE — G8417 CALC BMI ABV UP PARAM F/U: HCPCS | Performed by: FAMILY MEDICINE

## 2019-10-07 PROCEDURE — 90715 TDAP VACCINE 7 YRS/> IM: CPT | Performed by: FAMILY MEDICINE

## 2019-10-07 PROCEDURE — 90472 IMMUNIZATION ADMIN EACH ADD: CPT | Performed by: FAMILY MEDICINE

## 2019-10-07 PROCEDURE — 81002 URINALYSIS NONAUTO W/O SCOPE: CPT | Performed by: FAMILY MEDICINE

## 2019-10-07 PROCEDURE — 2022F DILAT RTA XM EVC RTNOPTHY: CPT | Performed by: FAMILY MEDICINE

## 2019-10-07 PROCEDURE — G8598 ASA/ANTIPLAT THER USED: HCPCS | Performed by: FAMILY MEDICINE

## 2019-10-07 PROCEDURE — 90471 IMMUNIZATION ADMIN: CPT | Performed by: FAMILY MEDICINE

## 2019-10-07 PROCEDURE — G8482 FLU IMMUNIZE ORDER/ADMIN: HCPCS | Performed by: FAMILY MEDICINE

## 2019-10-07 PROCEDURE — 90686 IIV4 VACC NO PRSV 0.5 ML IM: CPT | Performed by: FAMILY MEDICINE

## 2019-10-07 PROCEDURE — 99214 OFFICE O/P EST MOD 30 MIN: CPT | Performed by: FAMILY MEDICINE

## 2019-10-07 RX ORDER — SYRINGE AND NEEDLE,INSULIN,1ML 31 GX5/16"
1 SYRINGE, EMPTY DISPOSABLE MISCELLANEOUS 2 TIMES DAILY
Qty: 100 EACH | Refills: 2 | Status: SHIPPED | OUTPATIENT
Start: 2019-10-07 | End: 2021-11-16 | Stop reason: ALTCHOICE

## 2019-10-07 RX ORDER — FLUOXETINE HYDROCHLORIDE 40 MG/1
40 CAPSULE ORAL DAILY
Qty: 30 CAPSULE | Refills: 2 | Status: SHIPPED | OUTPATIENT
Start: 2019-10-07 | End: 2019-12-06 | Stop reason: SDUPTHER

## 2019-10-07 RX ORDER — PEN NEEDLE, DIABETIC 30 GX3/16"
NEEDLE, DISPOSABLE MISCELLANEOUS
Qty: 100 EACH | Refills: 0 | Status: SHIPPED | OUTPATIENT
Start: 2019-10-07 | End: 2020-12-08 | Stop reason: SDUPTHER

## 2019-10-07 ASSESSMENT — ENCOUNTER SYMPTOMS
CHEST TIGHTNESS: 0
ABDOMINAL PAIN: 0
SHORTNESS OF BREATH: 0
COUGH: 0
BLOOD IN STOOL: 0

## 2019-12-06 ENCOUNTER — OFFICE VISIT (OUTPATIENT)
Dept: FAMILY MEDICINE CLINIC | Age: 54
End: 2019-12-06
Payer: COMMERCIAL

## 2019-12-06 VITALS
WEIGHT: 174.4 LBS | BODY MASS INDEX: 31.89 KG/M2 | OXYGEN SATURATION: 99 % | SYSTOLIC BLOOD PRESSURE: 128 MMHG | TEMPERATURE: 98.3 F | HEART RATE: 64 BPM | DIASTOLIC BLOOD PRESSURE: 80 MMHG

## 2019-12-06 DIAGNOSIS — I10 ESSENTIAL HYPERTENSION: ICD-10-CM

## 2019-12-06 DIAGNOSIS — E78.5 HYPERLIPIDEMIA, UNSPECIFIED HYPERLIPIDEMIA TYPE: ICD-10-CM

## 2019-12-06 DIAGNOSIS — F32.A DEPRESSION, UNSPECIFIED DEPRESSION TYPE: ICD-10-CM

## 2019-12-06 DIAGNOSIS — I25.10 CORONARY ARTERY DISEASE INVOLVING NATIVE CORONARY ARTERY OF NATIVE HEART WITHOUT ANGINA PECTORIS: ICD-10-CM

## 2019-12-06 LAB
A/G RATIO: 1.6 (ref 1.1–2.2)
ALBUMIN SERPL-MCNC: 4.2 G/DL (ref 3.4–5)
ALP BLD-CCNC: 106 U/L (ref 40–129)
ALT SERPL-CCNC: 19 U/L (ref 10–40)
ANION GAP SERPL CALCULATED.3IONS-SCNC: 13 MMOL/L (ref 3–16)
AST SERPL-CCNC: 16 U/L (ref 15–37)
BASOPHILS ABSOLUTE: 0.1 K/UL (ref 0–0.2)
BASOPHILS RELATIVE PERCENT: 1.1 %
BILIRUB SERPL-MCNC: 0.4 MG/DL (ref 0–1)
BILIRUBIN, POC: NORMAL
BLOOD URINE, POC: NORMAL
BUN BLDV-MCNC: 14 MG/DL (ref 7–20)
CALCIUM SERPL-MCNC: 9.3 MG/DL (ref 8.3–10.6)
CHLORIDE BLD-SCNC: 96 MMOL/L (ref 99–110)
CHOLESTEROL, TOTAL: 166 MG/DL (ref 0–199)
CHP ED QC CHECK: NORMAL
CLARITY, POC: CLEAR
CO2: 23 MMOL/L (ref 21–32)
COLOR, POC: YELLOW
CREAT SERPL-MCNC: 0.7 MG/DL (ref 0.6–1.1)
EOSINOPHILS ABSOLUTE: 0.2 K/UL (ref 0–0.6)
EOSINOPHILS RELATIVE PERCENT: 4 %
GFR AFRICAN AMERICAN: >60
GFR NON-AFRICAN AMERICAN: >60
GLOBULIN: 2.6 G/DL
GLUCOSE BLD-MCNC: 335 MG/DL
GLUCOSE BLD-MCNC: 346 MG/DL (ref 70–99)
GLUCOSE URINE, POC: 1000
HCT VFR BLD CALC: 38.1 % (ref 36–48)
HDLC SERPL-MCNC: 63 MG/DL (ref 40–60)
HEMOGLOBIN: 12.8 G/DL (ref 12–16)
KETONES, POC: NORMAL
LDL CHOLESTEROL CALCULATED: 85 MG/DL
LEUKOCYTE EST, POC: NORMAL
LYMPHOCYTES ABSOLUTE: 1.1 K/UL (ref 1–5.1)
LYMPHOCYTES RELATIVE PERCENT: 18.5 %
MCH RBC QN AUTO: 30.4 PG (ref 26–34)
MCHC RBC AUTO-ENTMCNC: 33.6 G/DL (ref 31–36)
MCV RBC AUTO: 90.4 FL (ref 80–100)
MONOCYTES ABSOLUTE: 0.4 K/UL (ref 0–1.3)
MONOCYTES RELATIVE PERCENT: 6.8 %
NEUTROPHILS ABSOLUTE: 4.1 K/UL (ref 1.7–7.7)
NEUTROPHILS RELATIVE PERCENT: 69.6 %
NITRITE, POC: NORMAL
PDW BLD-RTO: 12.4 % (ref 12.4–15.4)
PH, POC: 5.5
PLATELET # BLD: 331 K/UL (ref 135–450)
PMV BLD AUTO: 8.6 FL (ref 5–10.5)
POTASSIUM SERPL-SCNC: 5 MMOL/L (ref 3.5–5.1)
PROTEIN, POC: NORMAL
RBC # BLD: 4.21 M/UL (ref 4–5.2)
SODIUM BLD-SCNC: 132 MMOL/L (ref 136–145)
SPECIFIC GRAVITY, POC: 1.02
TOTAL CK: 45 U/L (ref 26–192)
TOTAL PROTEIN: 6.8 G/DL (ref 6.4–8.2)
TRIGL SERPL-MCNC: 88 MG/DL (ref 0–150)
UROBILINOGEN, POC: 0.2
VLDLC SERPL CALC-MCNC: 18 MG/DL
WBC # BLD: 5.9 K/UL (ref 4–11)

## 2019-12-06 PROCEDURE — 81002 URINALYSIS NONAUTO W/O SCOPE: CPT | Performed by: FAMILY MEDICINE

## 2019-12-06 PROCEDURE — G8417 CALC BMI ABV UP PARAM F/U: HCPCS | Performed by: FAMILY MEDICINE

## 2019-12-06 PROCEDURE — 2022F DILAT RTA XM EVC RTNOPTHY: CPT | Performed by: FAMILY MEDICINE

## 2019-12-06 PROCEDURE — 1036F TOBACCO NON-USER: CPT | Performed by: FAMILY MEDICINE

## 2019-12-06 PROCEDURE — 3052F HG A1C>EQUAL 8.0%<EQUAL 9.0%: CPT | Performed by: FAMILY MEDICINE

## 2019-12-06 PROCEDURE — G8482 FLU IMMUNIZE ORDER/ADMIN: HCPCS | Performed by: FAMILY MEDICINE

## 2019-12-06 PROCEDURE — G8598 ASA/ANTIPLAT THER USED: HCPCS | Performed by: FAMILY MEDICINE

## 2019-12-06 PROCEDURE — 99214 OFFICE O/P EST MOD 30 MIN: CPT | Performed by: FAMILY MEDICINE

## 2019-12-06 PROCEDURE — 82962 GLUCOSE BLOOD TEST: CPT | Performed by: FAMILY MEDICINE

## 2019-12-06 PROCEDURE — 3017F COLORECTAL CA SCREEN DOC REV: CPT | Performed by: FAMILY MEDICINE

## 2019-12-06 PROCEDURE — G8427 DOCREV CUR MEDS BY ELIG CLIN: HCPCS | Performed by: FAMILY MEDICINE

## 2019-12-06 RX ORDER — FLUOXETINE HYDROCHLORIDE 40 MG/1
40 CAPSULE ORAL DAILY
Qty: 30 CAPSULE | Refills: 2 | Status: SHIPPED | OUTPATIENT
Start: 2019-12-06 | End: 2020-03-06 | Stop reason: SDUPTHER

## 2019-12-06 RX ORDER — PEN NEEDLE, DIABETIC 30 GX3/16"
1 NEEDLE, DISPOSABLE MISCELLANEOUS 4 TIMES DAILY
Qty: 90 EACH | Refills: 0 | Status: SHIPPED | OUTPATIENT
Start: 2019-12-06 | End: 2020-03-06 | Stop reason: SDUPTHER

## 2019-12-07 LAB
ESTIMATED AVERAGE GLUCOSE: 208.7 MG/DL
HBA1C MFR BLD: 8.9 %
HIV AG/AB: NORMAL
HIV ANTIGEN: NORMAL
HIV-1 ANTIBODY: NORMAL
HIV-2 AB: NORMAL
VARICELLA-ZOSTER VIRUS AB, IGG: NORMAL

## 2019-12-07 ASSESSMENT — ENCOUNTER SYMPTOMS
BLOOD IN STOOL: 0
ABDOMINAL PAIN: 0
SHORTNESS OF BREATH: 0
COUGH: 0

## 2019-12-09 ENCOUNTER — TELEPHONE (OUTPATIENT)
Dept: FAMILY MEDICINE CLINIC | Age: 54
End: 2019-12-09

## 2019-12-10 DIAGNOSIS — E87.1 HYPONATREMIA: Primary | ICD-10-CM

## 2020-03-06 ENCOUNTER — OFFICE VISIT (OUTPATIENT)
Dept: FAMILY MEDICINE CLINIC | Age: 55
End: 2020-03-06
Payer: COMMERCIAL

## 2020-03-06 VITALS
WEIGHT: 173.6 LBS | DIASTOLIC BLOOD PRESSURE: 80 MMHG | HEART RATE: 61 BPM | BODY MASS INDEX: 31.74 KG/M2 | SYSTOLIC BLOOD PRESSURE: 124 MMHG | TEMPERATURE: 98 F | OXYGEN SATURATION: 99 %

## 2020-03-06 DIAGNOSIS — E78.5 HYPERLIPIDEMIA, UNSPECIFIED HYPERLIPIDEMIA TYPE: ICD-10-CM

## 2020-03-06 LAB
A/G RATIO: 1.5 (ref 1.1–2.2)
ALBUMIN SERPL-MCNC: 4.1 G/DL (ref 3.4–5)
ALP BLD-CCNC: 112 U/L (ref 40–129)
ALT SERPL-CCNC: 18 U/L (ref 10–40)
ANION GAP SERPL CALCULATED.3IONS-SCNC: 12 MMOL/L (ref 3–16)
AST SERPL-CCNC: 16 U/L (ref 15–37)
BASOPHILS ABSOLUTE: 0.1 K/UL (ref 0–0.2)
BASOPHILS RELATIVE PERCENT: 1.4 %
BILIRUB SERPL-MCNC: 0.4 MG/DL (ref 0–1)
BILIRUBIN, POC: NORMAL
BLOOD URINE, POC: NORMAL
BUN BLDV-MCNC: 12 MG/DL (ref 7–20)
CALCIUM SERPL-MCNC: 9.4 MG/DL (ref 8.3–10.6)
CHLORIDE BLD-SCNC: 97 MMOL/L (ref 99–110)
CHOLESTEROL, TOTAL: 152 MG/DL (ref 0–199)
CHP ED QC CHECK: NORMAL
CLARITY, POC: CLEAR
CO2: 27 MMOL/L (ref 21–32)
COLOR, POC: YELLOW
CREAT SERPL-MCNC: 0.7 MG/DL (ref 0.6–1.1)
CREATININE URINE POCT: 200
EOSINOPHILS ABSOLUTE: 0.3 K/UL (ref 0–0.6)
EOSINOPHILS RELATIVE PERCENT: 5.7 %
GFR AFRICAN AMERICAN: >60
GFR NON-AFRICAN AMERICAN: >60
GLOBULIN: 2.7 G/DL
GLUCOSE BLD-MCNC: 243 MG/DL (ref 70–99)
GLUCOSE BLD-MCNC: 326 MG/DL
GLUCOSE URINE, POC: 1000
HCT VFR BLD CALC: 33.8 % (ref 36–48)
HDLC SERPL-MCNC: 60 MG/DL (ref 40–60)
HEMOGLOBIN: 11.3 G/DL (ref 12–16)
KETONES, POC: NORMAL
LDL CHOLESTEROL CALCULATED: 80 MG/DL
LEUKOCYTE EST, POC: NORMAL
LYMPHOCYTES ABSOLUTE: 1.3 K/UL (ref 1–5.1)
LYMPHOCYTES RELATIVE PERCENT: 23 %
MCH RBC QN AUTO: 30.3 PG (ref 26–34)
MCHC RBC AUTO-ENTMCNC: 33.5 G/DL (ref 31–36)
MCV RBC AUTO: 90.4 FL (ref 80–100)
MICROALBUMIN/CREAT 24H UR: 150 MG/G{CREAT}
MICROALBUMIN/CREAT UR-RTO: 30
MONOCYTES ABSOLUTE: 0.3 K/UL (ref 0–1.3)
MONOCYTES RELATIVE PERCENT: 5.6 %
NEUTROPHILS ABSOLUTE: 3.7 K/UL (ref 1.7–7.7)
NEUTROPHILS RELATIVE PERCENT: 64.3 %
NITRITE, POC: NORMAL
PDW BLD-RTO: 12.8 % (ref 12.4–15.4)
PH, POC: 6
PLATELET # BLD: 300 K/UL (ref 135–450)
PMV BLD AUTO: 8.3 FL (ref 5–10.5)
POTASSIUM SERPL-SCNC: 4.5 MMOL/L (ref 3.5–5.1)
PROTEIN, POC: 30
RBC # BLD: 3.73 M/UL (ref 4–5.2)
SODIUM BLD-SCNC: 136 MMOL/L (ref 136–145)
SPECIFIC GRAVITY, POC: 1.02
TOTAL CK: 62 U/L (ref 26–192)
TOTAL PROTEIN: 6.8 G/DL (ref 6.4–8.2)
TRIGL SERPL-MCNC: 62 MG/DL (ref 0–150)
UROBILINOGEN, POC: 0.2
VLDLC SERPL CALC-MCNC: 12 MG/DL
WBC # BLD: 5.7 K/UL (ref 4–11)

## 2020-03-06 PROCEDURE — G8482 FLU IMMUNIZE ORDER/ADMIN: HCPCS | Performed by: FAMILY MEDICINE

## 2020-03-06 PROCEDURE — 2022F DILAT RTA XM EVC RTNOPTHY: CPT | Performed by: FAMILY MEDICINE

## 2020-03-06 PROCEDURE — 82962 GLUCOSE BLOOD TEST: CPT | Performed by: FAMILY MEDICINE

## 2020-03-06 PROCEDURE — 3052F HG A1C>EQUAL 8.0%<EQUAL 9.0%: CPT | Performed by: FAMILY MEDICINE

## 2020-03-06 PROCEDURE — G8417 CALC BMI ABV UP PARAM F/U: HCPCS | Performed by: FAMILY MEDICINE

## 2020-03-06 PROCEDURE — G8427 DOCREV CUR MEDS BY ELIG CLIN: HCPCS | Performed by: FAMILY MEDICINE

## 2020-03-06 PROCEDURE — 3017F COLORECTAL CA SCREEN DOC REV: CPT | Performed by: FAMILY MEDICINE

## 2020-03-06 PROCEDURE — 81002 URINALYSIS NONAUTO W/O SCOPE: CPT | Performed by: FAMILY MEDICINE

## 2020-03-06 PROCEDURE — 99214 OFFICE O/P EST MOD 30 MIN: CPT | Performed by: FAMILY MEDICINE

## 2020-03-06 PROCEDURE — 82044 UR ALBUMIN SEMIQUANTITATIVE: CPT | Performed by: FAMILY MEDICINE

## 2020-03-06 PROCEDURE — 1036F TOBACCO NON-USER: CPT | Performed by: FAMILY MEDICINE

## 2020-03-06 RX ORDER — FLUTICASONE PROPIONATE 50 MCG
1 SPRAY, SUSPENSION (ML) NASAL DAILY
Qty: 1 BOTTLE | Refills: 0 | Status: SHIPPED | OUTPATIENT
Start: 2020-03-06 | End: 2021-11-16 | Stop reason: ALTCHOICE

## 2020-03-06 RX ORDER — FLUOXETINE HYDROCHLORIDE 40 MG/1
40 CAPSULE ORAL DAILY
Qty: 30 CAPSULE | Refills: 2 | Status: SHIPPED | OUTPATIENT
Start: 2020-03-06 | End: 2020-06-15 | Stop reason: SDUPTHER

## 2020-03-06 RX ORDER — PEN NEEDLE, DIABETIC 30 GX3/16"
NEEDLE, DISPOSABLE MISCELLANEOUS
Qty: 100 EACH | Refills: 0 | Status: CANCELLED | OUTPATIENT
Start: 2020-03-06

## 2020-03-06 RX ORDER — PEN NEEDLE, DIABETIC 30 GX3/16"
1 NEEDLE, DISPOSABLE MISCELLANEOUS 4 TIMES DAILY
Qty: 90 EACH | Refills: 0 | Status: SHIPPED | OUTPATIENT
Start: 2020-03-06 | End: 2021-11-16 | Stop reason: ALTCHOICE

## 2020-03-07 LAB
ESTIMATED AVERAGE GLUCOSE: 185.8 MG/DL
HBA1C MFR BLD: 8.1 %

## 2020-03-07 ASSESSMENT — ENCOUNTER SYMPTOMS
COUGH: 0
SHORTNESS OF BREATH: 0
CONSTIPATION: 0
BLOOD IN STOOL: 0
DIARRHEA: 0
CHEST TIGHTNESS: 0
ABDOMINAL PAIN: 0

## 2020-03-07 NOTE — PROGRESS NOTES
SUBJECTIVE:  Arabella Petty   1965   female   No Known Allergies    Chief Complaint   Patient presents with    Hypertension    Hyperlipidemia    Congestion        Patient Active Problem List    Diagnosis Date Noted    Trigger finger, right ring finger 07/23/2019    Diabetes mellitus type 2 in obese ([address] 01/31/2017    Angina effort 11/09/2015     Replacing deprecated diagnoses      CAD S/P percutaneous coronary angioplasty 11/09/2015    Coronary artery disease involving native coronary artery of native heart without angina pectoris 10/29/2015     Overview:   ang mLAD + pLCX st'd diffuse D + dLCX dz  '03  ang dRCAst'd LCA same '05  dRCA 35%ISR LCA same EF 60  5/06  MPI nml EF58  8/07      Mild intermittent asthma without complication 47/00/2169    Type 1 diabetes mellitus ([address]      IDDM      [facility] retinopathy [address]     Asthma     Irritable bowel syndrome     Dysthymia 04/21/2014    HTN (hypertension) 04/21/2014    IDDM (insulin dependent diabetes mellitus) ([facility] 04/21/2014    CAD (coronary artery disease) 04/21/2014    Hyperlipidemia 04/21/2014    Dyslipidemia 10/28/2012       HPI   Pt is here today for fu on HTN, diabetes, hyperlipidemia, CAD, depression and asthma. She has been stable on current mgt. She was evaluated 2/4 at the ER in [place] after noticing SOB while riding a bike. She had a cath and stent. She has followed up with her cardiologist here and Lipitor was changes to Crestor. She has been tolerating Crestor. Denies CP,SOB or myalgias. No ha,change in vision or neurologic sx. No GI complaints. Has been stable on Prozac. Denies sx of depression or anxiety. Sleeping well. Last HgA1c elevated at 8.9. she has not been adhering to a very good diet. No other concerns today. Has not had any trouble with asthma. No inhaler use. No other concerns today.    Past Medical History:   Diagnosis Date    Asthma     CAD (coronary artery disease)     Depression     Diabetes ([address]     IDDM    Diabetic retinopathy ([facility]     Hyperlipidemia     Hypertension     Irritable bowel syndrome      Social History     Socioeconomic History    Marital status: Single     Spouse name: Not on file    Number of children: Not on file    Years of education: Not on file    Highest education level: Not on file   Occupational History    Not on file   Social Needs    Financial resource strain: Not on file    Food insecurity     Worry: Not on file     Inability: Not on file    Transportation needs     Medical: Not on file     Non-medical: Not on file   Tobacco Use    Smoking status: Former Smoker     Packs/day: 1.00     Years: 22.00     Pack years: 22.00     Types: Cigarettes     Start date: 1980     Last attempt to quit: 2002     Years since quittin.8    Smokeless tobacco: Never Used   Substance and Sexual Activity    Alcohol use: No     Alcohol/week: 0.0 standard drinks    Drug use: No    Sexual activity: Not on file   Lifestyle    Physical activity     Days per week: Not on file     Minutes per session: Not on file    Stress: Not on file   Relationships    Social connections     Talks on phone: Not on file     Gets together: Not on file     Attends [employer] service: Not on file     Active member of club or organization: Not on file     Attends meetings of clubs or organizations: Not on file     Relationship status: Not on file    Intimate partner violence     Fear of current or ex partner: Not on file     Emotionally abused: Not on file     Physically abused: Not on file     Forced sexual activity: Not on file   Other Topics Concern    Not on file   Social History Narrative    Lives by herself     Family History   Problem Relation Age of Onset    Depression Mother     Heart Disease Mother     Depression Maternal Grandmother     Heart Disease Maternal Grandmother     Heart Disease Maternal Grandfather     Heart Disease Paternal Aunt         Review of Systems   Constitutional: Negative for activity change, appetite change, fatigue, fever and unexpected weight change. Respiratory: Negative for cough, chest tightness and shortness of breath. Cardiovascular: Negative for chest pain, palpitations and leg swelling. Gastrointestinal: Negative for abdominal pain, blood in stool, constipation and diarrhea. Musculoskeletal: Negative for arthralgias and myalgias. Skin: Negative for rash. Neurological: Negative for light-headedness and headaches. Hematological: Negative for adenopathy. Does not bruise/bleed easily. Psychiatric/Behavioral: Negative for dysphoric mood, sleep disturbance and suicidal ideas. The patient is not nervous/anxious. OBJECTIVE:  /80   Pulse 61   Temp 98 °F (36.7 °C)   Wt 173 lb 9.6 oz (78.7 kg)   LMP 08/26/2016   SpO2 99%   BMI 31.74 kg/m²   Physical Exam  Vitals signs and nursing note reviewed. Constitutional:       Appearance: She is well-developed. Eyes:      Pupils: Pupils are equal, round, and reactive to light. Neck:      Musculoskeletal: Normal range of motion and neck supple. Thyroid: No thyromegaly. Vascular: No JVD. Cardiovascular:      Rate and Rhythm: Normal rate and regular rhythm. Pulmonary:      Effort: Pulmonary effort is normal.      Breath sounds: Normal breath sounds. Abdominal:      General: Bowel sounds are normal.      Palpations: Abdomen is soft. Tenderness: There is no abdominal tenderness. Skin:     Findings: No rash. Neurological:      Mental Status: She is alert and oriented to person, place, and time. Psychiatric:         Behavior: Behavior normal.         Thought Content: Thought content normal.         ASSESSMENT/PLAN:    1.  IDDM (insulin dependent diabetes mellitus) ([facility]  Refill meds  Encouraged better blood sugar control and closer monitoring  Labs  appr diet discussed  Diabetic eye exam advised    - insulin aspart (NOVOLOG) 100 UNIT/ML injection vial; Inject 5 Units into the skin 3 the skin nightly 2 pen 0    Insulin Syringe-Needle U-100 30G X 5/16\" 1 ML MISC 1 each by Does not apply route daily 100 each 2    insulin aspart (NOVOLOG) 100 UNIT/ML injection vial Inject 5 Units into the skin 3 times daily (before meals) 1 vial 1    FLUoxetine (PROZAC) 40 MG capsule Take 1 capsule by mouth daily 30 capsule 2    blood glucose test strips (FREESTYLE LITE) strip USE ONE STRIP TO TEST FOUR TIMES A DAY AND AS NEEDED 100 strip 1    Insulin Pen Needle (PEN NEEDLES) 32G X 4 MM MISC Inject 1 each into the skin 4 times daily 90 each 0    fluticasone (FLONASE) 50 MCG/ACT nasal spray 1 spray by Each Nostril route daily 1 Bottle 0    insulin glargine (LANTUS SOLOSTAR) 100 UNIT/ML injection pen Inject 25 Units into the skin nightly 5 pen 2    Insulin Pen Needle (PEN NEEDLES) 32G X 4 MM MISC USE ONE NEEDLE DAILY WITH BASAGLAR 100 each 0    insulin glargine (LANTUS SOLOSTAR) 100 UNIT/ML injection pen Inject 24 Units into the skin nightly 10 pen 0    COMFORT ASSIST INSULIN SYRINGE 31G X 5/16\" 1 ML MISC Inject 1 each into the skin 2 times daily 100 each 2    FREESTYLE LANCETS MISC Inject 1 each into the skin 2 times daily 100 each 2    FLUoxetine (PROZAC) 20 MG capsule Take 1 capsule by mouth daily 90 capsule 0    Insulin Pen Needle (PEN NEEDLES) 32G X 4 MM MISC ONE NEEDLE - DAILY WITH BASAGLAR 100 each 0    albuterol sulfate HFA (PROAIR HFA) 108 (90 Base) MCG/ACT inhaler Inhale 2 puffs into the lungs every 6 hours as needed for Wheezing 1 Inhaler 0    naproxen sodium (ANAPROX DS) 550 MG tablet Take 1 tablet by mouth 2 times daily (with meals) 30 tablet 0    Insulin Syringe-Needle U-100 (B-D INS SYR ULTRAFINE 1CC/31G) 31G X 5/16\" 1 ML MISC Inject 1 each into the skin 3 times daily 150 each 1    blood glucose test strips (ASCENSIA AUTODISC VI;ONE TOUCH ULTRA TEST VI) strip 1 each by In Vitro route 6 times daily The patient tests six times daily.   She uses the freestyle strips 180 each 2    Insulin Pen Needle (PEN NEEDLES) 32G X 4 MM MISC USE ONE NEW NEEDLE DAILY WITH BASAGLAR 100 each 0    Blood Glucose Monitoring Suppl (FREESTYLE LITE) QUAN Inject 1 Device into the skin 4 times daily 1 Device 0    baclofen (LIORESAL) 10 MG tablet TK 1 T PO TID PRF MUSCLE SPASMS  0    polyethylene glycol (GLYCOLAX) powder MIX 17 GRAMS IN LIQUID OF CHOICE AND TK ONCE D PRF CONSTIPATION  1    nitroGLYCERIN (NITROSTAT) 0.3 MG SL tablet       atorvastatin (LIPITOR) 80 MG tablet       lisinopril (PRINIVIL;ZESTRIL) 20 MG tablet Take 20 mg by mouth      metoprolol (TOPROL-XL) 50 MG XL tablet Take 50 mg by mouth      amLODIPine (NORVASC) 5 MG tablet Take 5 mg by mouth      Blood Glucose Monitoring Suppl (ONE TOUCH ULTRA MINI) W/DEVICE KIT 1 kit by Does not apply route daily as needed 1 kit 0    Tuberculin-Allergy Syringes (B-D PLASTIPAK SYRINGES ALLERGY) 28G X 1/2\" 1 ML MISC 1 each by Does not apply route daily as needed 50 each 1    Insulin NPH Isophane & Regular (HUMULIN 70/30 PEN) (70-30) 100 UNIT/ML injection pen Inject 25 Units into the skin      Glucose Blood (GLUCOMETER ELITE TEST VI) by In Vitro route. The patient needs the One Touch Ultra Glucometer. Would not come up in epic.  aspirin 81 MG tablet Take 81 mg by mouth daily.  clopidogrel (PLAVIX) 75 MG tablet Take 75 mg by mouth daily. No current facility-administered medications for this visit. mammogram advised again  Pap/GYN exam advised     Return in about 3 months (around 6/6/2020), or if symptoms worsen or fail to improve.     Yvonne Corrales MD

## 2020-03-09 ENCOUNTER — TELEPHONE (OUTPATIENT)
Dept: ORTHOPEDIC SURGERY | Age: 55
End: 2020-03-09

## 2020-03-09 ENCOUNTER — TELEPHONE (OUTPATIENT)
Dept: FAMILY MEDICINE CLINIC | Age: 55
End: 2020-03-09

## 2020-03-09 RX ORDER — INSULIN GLARGINE 100 [IU]/ML
20 INJECTION, SOLUTION SUBCUTANEOUS NIGHTLY
Qty: 2 PEN | Refills: 2 | Status: SHIPPED | OUTPATIENT
Start: 2020-03-09 | End: 2020-06-15 | Stop reason: SDUPTHER

## 2020-03-09 NOTE — TELEPHONE ENCOUNTER
PA submitted via CMM for NovoLOG 100UNIT/ML solution.  (Key: [id number]) Rx #: [id number]     STATUS: AWAITING CLINICAL QUESTIONS

## 2020-04-20 ENCOUNTER — TELEPHONE (OUTPATIENT)
Dept: FAMILY MEDICINE CLINIC | Age: 55
End: 2020-04-20

## 2020-05-15 RX ORDER — BLOOD-GLUCOSE METER
KIT MISCELLANEOUS
Qty: 100 STRIP | Refills: 0 | Status: SHIPPED | OUTPATIENT
Start: 2020-05-15 | End: 2020-08-04

## 2020-06-08 RX ORDER — INSULIN GLARGINE 100 [IU]/ML
INJECTION, SOLUTION SUBCUTANEOUS
Qty: 5 PEN | Refills: 2 | OUTPATIENT
Start: 2020-06-08

## 2020-06-15 ENCOUNTER — OFFICE VISIT (OUTPATIENT)
Dept: FAMILY MEDICINE CLINIC | Age: 55
End: 2020-06-15
Payer: COMMERCIAL

## 2020-06-15 VITALS
BODY MASS INDEX: 30.79 KG/M2 | DIASTOLIC BLOOD PRESSURE: 72 MMHG | OXYGEN SATURATION: 98 % | TEMPERATURE: 97 F | WEIGHT: 168.4 LBS | HEART RATE: 71 BPM | SYSTOLIC BLOOD PRESSURE: 133 MMHG

## 2020-06-15 LAB
BILIRUBIN, POC: NORMAL
BLOOD URINE, POC: NORMAL
CHP ED QC CHECK: NORMAL
CLARITY, POC: CLEAR
COLOR, POC: NORMAL
GLUCOSE BLD-MCNC: 97 MG/DL
GLUCOSE URINE, POC: NORMAL
KETONES, POC: NORMAL
LEUKOCYTE EST, POC: NORMAL
NITRITE, POC: NORMAL
PH, POC: 5.5
PROTEIN, POC: 100
SPECIFIC GRAVITY, POC: 1.03
UROBILINOGEN, POC: 0.2

## 2020-06-15 PROCEDURE — 3017F COLORECTAL CA SCREEN DOC REV: CPT | Performed by: FAMILY MEDICINE

## 2020-06-15 PROCEDURE — 81002 URINALYSIS NONAUTO W/O SCOPE: CPT | Performed by: FAMILY MEDICINE

## 2020-06-15 PROCEDURE — G8417 CALC BMI ABV UP PARAM F/U: HCPCS | Performed by: FAMILY MEDICINE

## 2020-06-15 PROCEDURE — 2022F DILAT RTA XM EVC RTNOPTHY: CPT | Performed by: FAMILY MEDICINE

## 2020-06-15 PROCEDURE — G8427 DOCREV CUR MEDS BY ELIG CLIN: HCPCS | Performed by: FAMILY MEDICINE

## 2020-06-15 PROCEDURE — 99214 OFFICE O/P EST MOD 30 MIN: CPT | Performed by: FAMILY MEDICINE

## 2020-06-15 PROCEDURE — 1036F TOBACCO NON-USER: CPT | Performed by: FAMILY MEDICINE

## 2020-06-15 PROCEDURE — 82962 GLUCOSE BLOOD TEST: CPT | Performed by: FAMILY MEDICINE

## 2020-06-15 PROCEDURE — 3052F HG A1C>EQUAL 8.0%<EQUAL 9.0%: CPT | Performed by: FAMILY MEDICINE

## 2020-06-15 RX ORDER — ROSUVASTATIN CALCIUM 40 MG/1
TABLET, COATED ORAL
COMMUNITY
Start: 2020-04-27 | End: 2021-04-27 | Stop reason: SDUPTHER

## 2020-06-15 RX ORDER — BENZONATATE 200 MG/1
CAPSULE ORAL
COMMUNITY
Start: 2020-04-14 | End: 2021-11-16 | Stop reason: ALTCHOICE

## 2020-06-15 RX ORDER — AZITHROMYCIN 250 MG/1
TABLET, FILM COATED ORAL
COMMUNITY
Start: 2020-04-14 | End: 2021-11-16

## 2020-06-15 RX ORDER — FLUOXETINE HYDROCHLORIDE 40 MG/1
40 CAPSULE ORAL DAILY
Qty: 30 CAPSULE | Refills: 2 | Status: SHIPPED | OUTPATIENT
Start: 2020-06-15 | End: 2020-10-14 | Stop reason: SDUPTHER

## 2020-06-15 RX ORDER — INSULIN GLARGINE 100 [IU]/ML
20 INJECTION, SOLUTION SUBCUTANEOUS NIGHTLY
Qty: 2 PEN | Refills: 2 | Status: SHIPPED | OUTPATIENT
Start: 2020-06-15 | End: 2020-10-16 | Stop reason: SDUPTHER

## 2020-06-15 RX ORDER — ROSUVASTATIN CALCIUM 10 MG/1
TABLET, COATED ORAL DAILY
COMMUNITY
End: 2020-07-03 | Stop reason: ALTCHOICE

## 2020-06-15 RX ORDER — INSULIN ASPART 100 [IU]/ML
INJECTION, SUSPENSION SUBCUTANEOUS
COMMUNITY
Start: 2012-08-10 | End: 2022-01-03

## 2020-06-17 ENCOUNTER — NURSE TRIAGE (OUTPATIENT)
Dept: OTHER | Facility: CLINIC | Age: 55
End: 2020-06-17

## 2020-06-17 ENCOUNTER — TELEPHONE (OUTPATIENT)
Dept: FAMILY MEDICINE CLINIC | Age: 55
End: 2020-06-17

## 2020-06-17 NOTE — TELEPHONE ENCOUNTER
Patient was told by NT that she can be seen today. She has a headache, fever, wheezing, shortness of breath, weakness and a cough. Please call patient to recommend her on what to do.

## 2020-06-17 NOTE — TELEPHONE ENCOUNTER
Reason for Disposition   MILD difficulty breathing (e.g., minimal/no SOB at rest, SOB with walking, pulse <100)    Answer Assessment - Initial Assessment Questions  1. COVID-19 DIAGNOSIS: \"Who made your Coronavirus (COVID-19) diagnosis? \" \"Was it confirmed by a positive lab test?\" If not diagnosed by a HCP, ask \"Are there lots of cases (community spread) where you live? \" (See public health department website, if unsure)      No diagnosis, just came from [facility]  2. ONSET: \"When did the COVID-19 symptoms start? \"       6/16/2020  3. WORST SYMPTOM: \"What is your worst symptom? \" (e.g., cough, fever, shortness of breath, muscle aches)      cough  4. COUGH: \"Do you have a cough? \" If so, ask: \"How bad is the cough? \"        Yes, moderate4  5. FEVER: \"Do you have a fever? \" If so, ask: \"What is your temperature, how was it measured, and when did it start? \"      Yes, unsure I don't have a thermometer, but I feel warm  6. RESPIRATORY STATUS: \"Describe your breathing? \" (e.g., shortness of breath, wheezing, unable to speak)       wheezing  7. BETTER-SAME-WORSE: Tk Rein you getting better, staying the same or getting worse compared to yesterday? \"  If getting worse, ask, \"In what way? \"      Getting worse because today I am feverish  8. HIGH RISK DISEASE: \"Do you have any chronic medical problems? \" (e.g., asthma, heart or lung disease, weak immune system, etc.)      Diabetic, 5 cardiac stints from heart disease  9. PREGNANCY: \"Is there any chance you are pregnant? \" \"When was your last menstrual period? \"      n/a  10. OTHER SYMPTOMS: \"Do you have any other symptoms? \"  (e.g., chills, fatigue, headache, loss of smell or taste, muscle pain, sore throat)        Muscle pain, joint pain, weakness, headache, coughing up phlegm- white to clear in color    Protocols used: CORONAVIRUS (COVID-19) DIAGNOSED OR SUSPECTED-ADULT-    Caller is experiencing s/s of cough with clear and white phlegm, muscle and join pain, weakness, headache, fever,

## 2020-06-18 ENCOUNTER — OFFICE VISIT (OUTPATIENT)
Dept: PRIMARY CARE CLINIC | Age: 55
End: 2020-06-18
Payer: COMMERCIAL

## 2020-06-18 ENCOUNTER — TELEPHONE (OUTPATIENT)
Dept: PRIMARY CARE CLINIC | Age: 55
End: 2020-06-18

## 2020-06-18 VITALS — OXYGEN SATURATION: 99 % | HEART RATE: 61 BPM | TEMPERATURE: 98.1 F

## 2020-06-18 PROCEDURE — 99213 OFFICE O/P EST LOW 20 MIN: CPT | Performed by: NURSE PRACTITIONER

## 2020-06-18 NOTE — PROGRESS NOTES
2020  Zuleima Petty (:  1965)    PCP: Dr. Jamir Jones / Occupation: N/A              FLU/COVID-19 CLINIC EVALUATION    [] ASYMPTOMATIC  [] RETEST  [] EXPOSURE TO KNOWN POSITIVE    HPI: Patient seen PCP on 6/15. Was asymptotic at that time. PCP referred to flu clinic since symptoms have started. Patient returned from [place] last week (flew, possible exposure on flight)  . SYMPTOMS:  Primary Language: [x] English   [] Macanese  [] Bengali     Allergies: NKDA  SOCIAL HISTORY:  Number of people living in patient's home (counting the patient as 1):    [x] 1   [] 2   [] 3   [] 4   [] 5   [] >6    Symptom duration, days:  [] 1   [x] 2   [] 3   [] 4 - 7   [] 8 - 10   [] 11 - 13   [] >14    [x] Fevers    [] Symptom (not measured)  [x] Measured (Result:  Degrees) low grade   [] Chills  [] Cough [] Dry [] Productive   []Loss of Taste  [] Loss of Smell  []Decreased Appetite  [] Coughing up blood  }  [] Chest Congestion  [x] Nasal Congestion  [] Runny  Nose  [] Sneezing  [] Feeling short of breath   []Sometimes    [] Frequently    [] All the time     [] Chest pain     [] Headaches  []Tolerable  [] Severe     [x] Fatigue  [] Sore throat  [] Muscle aches  [] Nausea  [] Vomiting  []Unable to keep fluids down     [] Diarrhea  []Severe       [] OTHER SYMPTOMS:    Symptom course:   [] Worsening     [x] Stable     [] Improving    RISK FACTORS:1}  [] Pregnant or possibly pregnant  [] Age over 61  [x] Diabetes- Type 1   [] HTN  [x] Heart disease- Cardiac stents   [] Asthma  [x] COPD/Other chronic lung diseases  [] Active Cancer  [] On Chemotherapy  [] Taking oral steroids  [] History Lymphoma/Leukemia  [] Autoimmune Disorder  [] Close contact with a lab confirmed COVID-19 patient within 14 days of symptom onset  [x] History of travel from affected geographical areas within 14 days of symptom onset Patient flew back from [place] last week.       PHYSICAL EXAMINATION:  Vitals:    20 1138   Pulse: 61   Temp: 98.1 °F

## 2020-06-18 NOTE — TELEPHONE ENCOUNTER
Your patient was seen at the [facility] today. A follow up virtual visit with the patient's PCP should be completed in 48 hours. Please schedule the patient for this follow-up. Thank you.

## 2020-06-19 ENCOUNTER — VIRTUAL VISIT (OUTPATIENT)
Dept: FAMILY MEDICINE CLINIC | Age: 55
End: 2020-06-19
Payer: COMMERCIAL

## 2020-06-19 PROCEDURE — 99213 OFFICE O/P EST LOW 20 MIN: CPT | Performed by: FAMILY MEDICINE

## 2020-06-19 PROCEDURE — G8427 DOCREV CUR MEDS BY ELIG CLIN: HCPCS | Performed by: FAMILY MEDICINE

## 2020-06-19 PROCEDURE — 2022F DILAT RTA XM EVC RTNOPTHY: CPT | Performed by: FAMILY MEDICINE

## 2020-06-19 PROCEDURE — 3017F COLORECTAL CA SCREEN DOC REV: CPT | Performed by: FAMILY MEDICINE

## 2020-06-19 PROCEDURE — 3052F HG A1C>EQUAL 8.0%<EQUAL 9.0%: CPT | Performed by: FAMILY MEDICINE

## 2020-06-19 NOTE — PROGRESS NOTES
2020    TELEHEALTH EVALUATION -- Audio/Visual (During BZLZR-85 public health emergency)    HPI:    Emile Rock (:  1965) has requested an audio/video evaluation for the following concern(s):    Pt with hx of diabetes is here today by video VV from home for fu on URI sx. Pt reports she started having sx of fatigue, hoarseness, low grade fever and cough the day after her visit for med check ( 3d ago) . She was evaluated at the flu clinic and had a COVID test which is pending. Today , she has been feeling better. Cough has improved and fatigue is better. Denies CP,SOB. No GI sx. Mild URI sx. No other concerns . Review of Systems   Constitutional: Positive for fever. Negative for activity change, appetite change and unexpected weight change. HENT: Positive for voice change. Hoarseness   Respiratory: Positive for cough. Negative for chest tightness and shortness of breath. Cardiovascular: Negative for chest pain, palpitations and leg swelling. Gastrointestinal: Negative for abdominal pain, constipation and diarrhea. Musculoskeletal: Negative for arthralgias and myalgias. Skin: Negative for rash. Neurological: Negative for light-headedness and headaches. Hematological: Negative for adenopathy. Does not bruise/bleed easily. Prior to Visit Medications    Medication Sig Taking?  Authorizing Provider   rosuvastatin (CRESTOR) 10 MG tablet Take by mouth daily  Historical Provider, MD   rosuvastatin (CRESTOR) 40 MG tablet   Historical Provider, MD   insulin aspart protamine-insulin aspart (NOVOLOG MIX 70/30) (70-30) 100 UNIT/ML injection use as directed  Historical Provider, MD   benzonatate (TESSALON) 200 MG capsule TK ONE C PO  TID PRF COUGH FOR UP TO 14 DAYS  Historical Provider, MD   azithromycin (ZITHROMAX) 250 MG tablet ZPK  Historical Provider, MD   FLUoxetine (PROZAC) 40 MG capsule Take 1 capsule by mouth daily  Lucas Varma MD   insulin lispro (HUMALOG) 100 UNIT/ML injection vial Inject 5 Units into the skin 3 times daily (before meals)  Lucas Varma MD   insulin glargine (LANTUS SOLOSTAR) 100 UNIT/ML injection pen Inject 20 Units into the skin nightly  Lucas Varma MD   FREESTYLE LITE strip USE TO TEST BLOOD SUGAR FOUR TIMES A DAY AS NEEDED  Lucas Varma MD   Insulin Syringe-Needle U-100 30G X 5/16\" 1 ML MISC 1 each by Does not apply route daily  Lucas Varma MD   blood glucose test strips (FREESTYLE LITE) strip USE ONE STRIP TO TEST FOUR TIMES A DAY AND AS NEEDED  Lucas Varma MD   Insulin Pen Needle (PEN NEEDLES) 32G X 4 MM MISC Inject 1 each into the skin 4 times daily  Liam Duggan MD   fluticasone (FLONASE) 50 MCG/ACT nasal spray 1 spray by Each Nostril route daily  Patient not taking: Reported on 6/15/2020  Mason Varma MD   insulin glargine (LANTUS SOLOSTAR) 100 UNIT/ML injection pen Inject 25 Units into the skin nightly  Lucas Varma MD   Insulin Pen Needle (PEN NEEDLES) 32G X 4 MM MISC USE ONE NEEDLE DAILY WITH BLANCA Varma MD   insulin glargine (LANTUS SOLOSTAR) 100 UNIT/ML injection pen Inject 24 Units into the skin nightly  Mason Varma MD   COMFORT ASSIST INSULIN SYRINGE 31G X 5/16\" 1 ML MISC Inject 1 each into the skin 2 times daily  Lucas Varma MD   FREESTYLE LANCETS MISC Inject 1 each into the skin 2 times daily  Mason Varma MD   Insulin Pen Needle (PEN NEEDLES) 32G X 4 MM MISC ONE NEEDLE - DAILY WITH BLANCA Varma MD   albuterol sulfate HFA (PROAIR HFA) 108 (90 Base) MCG/ACT inhaler Inhale 2 puffs into the lungs every 6 hours as needed for Wheezing  Marisela Lemus, APRN - CNP   naproxen sodium (ANAPROX DS) 550 MG tablet Take 1 tablet by mouth 2 times daily (with meals)  Patient not taking: Reported on 6/15/2020  Mason Varma MD   Insulin Syringe-Needle U-100 (B-D INS SYR ULTRAFINE 1CC/31G) 31G X 5/16\" 1 ML MISC Inject 1 each into the skin 3 times daily  Mason Varma MD   blood glucose test strips (ASCENSIA AUTODISC VI;ONE  Alcohol use: No     Alcohol/week: 0.0 standard drinks    Drug use: No        Past Medical History:   Diagnosis Date    Asthma     CAD (coronary artery disease)     Depression     Diabetes (HCC)     IDDM    Diabetic retinopathy ([facility] [address]     Hyperlipidemia     Hypertension     Irritable bowel syndrome    ,   Past Surgical History:   Procedure Laterality Date    APPENDECTOMY      CARDIAC SURGERY      STENTS IN PLACE    EYE SURGERY      HERNIA REPAIR      OTHER SURGICAL HISTORY      colon polyps    OTHER SURGICAL HISTORY      Stent    TONSILLECTOMY     ,   Social History     Tobacco Use    Smoking status: Former Smoker     Packs/day: 1.00     Years: 22.00     Pack years: 22.00     Types: Cigarettes     Start date: 1980     Last attempt to quit: 2002     Years since quittin.1    Smokeless tobacco: Never Used   Substance Use Topics    Alcohol use: No     Alcohol/week: 0.0 standard drinks    Drug use: No       PHYSICAL EXAMINATION:  [ INSTRUCTIONS:  \"[x]\" Indicates a positive item  \"[]\" Indicates a negative item  -- DELETE ALL ITEMS NOT EXAMINED]  Vital Signs: (As obtained by patient/caregiver or practitioner observation)    Blood pressure-  Heart rate-    Respiratory rate-    Temperature-  Pulse oximetry-     Constitutional: [x] Appears well-developed and well-nourished [x] No apparent distress      [] Abnormal-   Mental status  [x] Alert and awake  [x] Oriented to person/place/time []Able to follow commands      Eyes:  EOM    []  Normal  [] Abnormal-  Sclera  []  Normal  [] Abnormal -         Discharge []  None visible  [] Abnormal -    HENT:   [x] Normocephalic, atraumatic.   [] Abnormal   [] Mouth/Throat: Mucous membranes are moist.     External Ears [] Normal  [] Abnormal-     Neck: [x] No visualized mass     Pulmonary/Chest: [x] Respiratory effort normal.  [x] No visualized signs of difficulty breathing or respiratory distress        [] Abnormal-      Musculoskeletal:   [] Normal

## 2020-06-21 ASSESSMENT — ENCOUNTER SYMPTOMS
CONSTIPATION: 0
DIARRHEA: 0
VOICE CHANGE: 1
SHORTNESS OF BREATH: 0
ABDOMINAL PAIN: 0
COUGH: 1
CHEST TIGHTNESS: 0

## 2020-06-23 LAB
SARS-COV-2: NOT DETECTED
SOURCE: NORMAL

## 2020-07-01 PROBLEM — R79.89 TROPONIN LEVEL ELEVATED: Status: ACTIVE | Noted: 2020-02-04

## 2020-07-01 PROBLEM — R77.8 TROPONIN LEVEL ELEVATED: Status: ACTIVE | Noted: 2020-02-04

## 2020-07-03 ASSESSMENT — ENCOUNTER SYMPTOMS
SHORTNESS OF BREATH: 0
COUGH: 0
CHEST TIGHTNESS: 0
CONSTIPATION: 0
ABDOMINAL PAIN: 0
BLOOD IN STOOL: 0
DIARRHEA: 0

## 2020-07-03 NOTE — PROGRESS NOTES
little physical activity.     Past Medical History:   Diagnosis Date    Asthma     CAD (coronary artery disease)     Depression     Diabetes (HCC)     IDDM    Diabetic retinopathy ([facility] [address]     Hyperlipidemia     Hypertension     Irritable bowel syndrome      Social History     Socioeconomic History    Marital status: Single     Spouse name: Not on file    Number of children: Not on file    Years of education: Not on file    Highest education level: Not on file   Occupational History    Not on file   Social Needs    Financial resource strain: Not on file    Food insecurity     Worry: Not on file     Inability: Not on file    Transportation needs     Medical: Not on file     Non-medical: Not on file   Tobacco Use    Smoking status: Former Smoker     Packs/day: 1.00     Years: 22.00     Pack years: 22.00     Types: Cigarettes     Start date: 1980     Last attempt to quit: 2002     Years since quittin.2    Smokeless tobacco: Never Used   Substance and Sexual Activity    Alcohol use: No     Alcohol/week: 0.0 standard drinks    Drug use: No    Sexual activity: Not on file   Lifestyle    Physical activity     Days per week: Not on file     Minutes per session: Not on file    Stress: Not on file   Relationships    Social connections     Talks on phone: Not on file     Gets together: Not on file     Attends Confucianism service: Not on file     Active member of club or organization: Not on file     Attends meetings of clubs or organizations: Not on file     Relationship status: Not on file    Intimate partner violence     Fear of current or ex partner: Not on file     Emotionally abused: Not on file     Physically abused: Not on file     Forced sexual activity: Not on file   Other Topics Concern    Not on file   Social History Narrative    Lives by herself     Family History   Problem Relation Age of Onset    Depression Mother     Heart Disease Mother     Depression Maternal Grandmother  Heart Disease Maternal Grandmother     Heart Disease Maternal Grandfather     Heart Disease Paternal Aunt        Review of Systems   Constitutional: Negative for activity change, appetite change and unexpected weight change. Respiratory: Negative for cough, chest tightness and shortness of breath. Cardiovascular: Negative for chest pain, palpitations and leg swelling. Gastrointestinal: Negative for abdominal pain, blood in stool, constipation and diarrhea. Endocrine: Negative for cold intolerance and heat intolerance. Musculoskeletal: Negative for arthralgias and myalgias. Skin: Negative for rash. Neurological: Negative for light-headedness and headaches. Hematological: Negative for adenopathy. Does not bruise/bleed easily. Psychiatric/Behavioral: Negative for dysphoric mood, sleep disturbance and suicidal ideas. The patient is not nervous/anxious. OBJECTIVE:  /72   Pulse 71   Temp 97 °F (36.1 °C)   Wt 168 lb 6.4 oz (76.4 kg)   LMP 08/26/2016   SpO2 98%   BMI 30.79 kg/m²   Physical Exam  Vitals signs and nursing note reviewed. Constitutional:       Appearance: She is well-developed. Neck:      Musculoskeletal: Normal range of motion and neck supple. Thyroid: No thyromegaly. Vascular: No JVD. Cardiovascular:      Rate and Rhythm: Normal rate and regular rhythm. Pulmonary:      Effort: Pulmonary effort is normal.      Breath sounds: Normal breath sounds. Abdominal:      General: Bowel sounds are normal.      Palpations: Abdomen is soft. Tenderness: There is no abdominal tenderness. Musculoskeletal:      Comments: Feet- no lesions   Skin:     Findings: No rash. Neurological:      Mental Status: She is alert and oriented to person, place, and time. Psychiatric:         Behavior: Behavior normal.         Thought Content: Thought content normal.         ASSESSMENT/PLAN:    1.  Type 1 diabetes mellitus without complication (HCC)  Refill meds  Advised appr diet and increase physical activity  Ambulatory BS checks  Diabetic eye exam  - POCT Glucose  - POCT Urinalysis no Micro  - Hemoglobin A1C; Future    2. Depression, unspecified depression type    - FLUoxetine (PROZAC) 40 MG capsule; Take 1 capsule by mouth daily  Dispense: 30 capsule; Refill: 2    3. Coronary artery disease involving native coronary artery of native heart without angina pectoris  Reviewed current mgt  Reviewed last cardiology fu and recommendations    4. Hyperlipidemia, unspecified hyperlipidemia type  Continue Crestor  - Comprehensive Metabolic Panel; Future  - CBC Auto Differential; Future  - CK; Future  - Lipid Panel; Future    5. Essential hypertension  Reviewed current mgt  Intermittent ambulatory BP checks  DASH diet reviewed    6.  Cervical cancer screening     - [facility]      Orders Placed This Encounter   Procedures    Comprehensive Metabolic Panel     Standing Status:   Future     Standing Expiration Date:   7/5/2021    CBC Auto Differential     Standing Status:   Future     Standing Expiration Date:   7/5/2021    CK     Standing Status:   Future     Standing Expiration Date:   7/5/2021    Lipid Panel     Standing Status:   Future     Standing Expiration Date:   7/5/2021     Order Specific Question:   Is Patient Fasting?/# of Hours     Answer:   10 hrs    Hemoglobin A1C     Standing Status:   Future     Standing Expiration Date:   7/5/2021   [facility]     Referral Priority:   Routine     Referral Type:   Eval and Treat     Referral Reason:   Specialty Services Required     Referred to Provider:   Yue Nunes MD     Requested Specialty:   Obstetrics & Gynecology     Number of Visits Requested:   1    POCT Glucose    POCT Urinalysis no Micro     Current Outpatient Medications   Medication Sig Dispense Refill    insulin aspart protamine-insulin aspart (NOVOLOG MIX 70/30) (70-30) 100 UNIT/ML injection use as directed      FLUoxetine (PROZAC) 40 MG capsule Take 1 capsule by mouth daily 30 capsule 2    insulin lispro (HUMALOG) 100 UNIT/ML injection vial Inject 5 Units into the skin 3 times daily (before meals) 1 vial 1    insulin glargine (LANTUS SOLOSTAR) 100 UNIT/ML injection pen Inject 20 Units into the skin nightly 2 pen 2    FREESTYLE LITE strip USE TO TEST BLOOD SUGAR FOUR TIMES A DAY AS NEEDED 100 strip 0    blood glucose test strips (FREESTYLE LITE) strip USE ONE STRIP TO TEST FOUR TIMES A DAY AND AS NEEDED 100 strip 1    insulin glargine (LANTUS SOLOSTAR) 100 UNIT/ML injection pen Inject 25 Units into the skin nightly 5 pen 2    insulin glargine (LANTUS SOLOSTAR) 100 UNIT/ML injection pen Inject 24 Units into the skin nightly 10 pen 0    COMFORT ASSIST INSULIN SYRINGE 31G X 5/16\" 1 ML MISC Inject 1 each into the skin 2 times daily 100 each 2    FREESTYLE LANCETS MISC Inject 1 each into the skin 2 times daily 100 each 2    albuterol sulfate HFA (PROAIR HFA) 108 (90 Base) MCG/ACT inhaler Inhale 2 puffs into the lungs every 6 hours as needed for Wheezing 1 Inhaler 0    blood glucose test strips (ASCENSIA AUTODISC VI;ONE TOUCH ULTRA TEST VI) strip 1 each by In Vitro route 6 times daily The patient tests six times daily. She uses the freestyle strips 180 each 2    Blood Glucose Monitoring Suppl (FREESTYLE LITE) QUAN Inject 1 Device into the skin 4 times daily 1 Device 0    nitroGLYCERIN (NITROSTAT) 0.3 MG SL tablet       metoprolol (TOPROL-XL) 50 MG XL tablet Take 50 mg by mouth      amLODIPine (NORVASC) 5 MG tablet Take 5 mg by mouth      Blood Glucose Monitoring Suppl (ONE TOUCH ULTRA MINI) W/DEVICE KIT 1 kit by Does not apply route daily as needed 1 kit 0    Insulin NPH Isophane & Regular (HUMULIN 70/30 PEN) (70-30) 100 UNIT/ML injection pen Inject 25 Units into the skin      Glucose Blood (GLUCOMETER ELITE TEST VI) by In Vitro route.  The patient needs the One Touch Ultra Glucometer. Would not come up in epic.  aspirin 81 MG tablet Take 81 mg by mouth daily.  clopidogrel (PLAVIX) 75 MG tablet Take 75 mg by mouth daily.  rosuvastatin (CRESTOR) 10 MG tablet Take by mouth daily      rosuvastatin (CRESTOR) 40 MG tablet       benzonatate (TESSALON) 200 MG capsule TK ONE C PO  TID PRF COUGH FOR UP TO 14 DAYS      azithromycin (ZITHROMAX) 250 MG tablet ZPK      Insulin Syringe-Needle U-100 30G X 5/16\" 1 ML MISC 1 each by Does not apply route daily 100 each 2    Insulin Pen Needle (PEN NEEDLES) 32G X 4 MM MISC Inject 1 each into the skin 4 times daily 90 each 0    fluticasone (FLONASE) 50 MCG/ACT nasal spray 1 spray by Each Nostril route daily (Patient not taking: Reported on 6/15/2020) 1 Bottle 0    Insulin Pen Needle (PEN NEEDLES) 32G X 4 MM MISC USE ONE NEEDLE DAILY WITH BASAGLAR 100 each 0    Insulin Pen Needle (PEN NEEDLES) 32G X 4 MM MISC ONE NEEDLE - DAILY WITH BASAGLAR 100 each 0    naproxen sodium (ANAPROX DS) 550 MG tablet Take 1 tablet by mouth 2 times daily (with meals) (Patient not taking: Reported on 6/15/2020) 30 tablet 0    Insulin Syringe-Needle U-100 (B-D INS SYR ULTRAFINE 1CC/31G) 31G X 5/16\" 1 ML MISC Inject 1 each into the skin 3 times daily 150 each 1    Insulin Pen Needle (PEN NEEDLES) 32G X 4 MM MISC USE ONE NEW NEEDLE DAILY WITH BASAGLAR 100 each 0    baclofen (LIORESAL) 10 MG tablet TK 1 T PO TID PRF MUSCLE SPASMS  0    polyethylene glycol (GLYCOLAX) powder MIX 17 GRAMS IN LIQUID OF CHOICE AND TK ONCE D PRF CONSTIPATION  1    atorvastatin (LIPITOR) 80 MG tablet       Tuberculin-Allergy Syringes (B-D PLASTIPAK SYRINGES ALLERGY) 28G X 1/2\" 1 ML MISC 1 each by Does not apply route daily as needed 50 each 1     No current facility-administered medications for this visit. Shingrix advised  Return in about 3 months (around 9/15/2020), or if symptoms worsen or fail to improve, for diabetes, CAD, depression.     Selma Darden MD

## 2020-07-13 ENCOUNTER — TELEPHONE (OUTPATIENT)
Dept: FAMILY MEDICINE CLINIC | Age: 55
End: 2020-07-13

## 2020-07-13 NOTE — TELEPHONE ENCOUNTER
Pt called and states that she is out of her Prozac 40 mg, once daily, pt has been spacing them out so that she does not run out, but she is out now and wanted to see if she could get this filled to [facility]. Pt will be back in town after a couple days, she is still in [place], and her pharmacy  Can send it to her in [place]. Please call pt to advise @ [phone], pt LOV  [phone].

## 2020-07-28 ENCOUNTER — TELEPHONE (OUTPATIENT)
Dept: FAMILY MEDICINE CLINIC | Age: 55
End: 2020-07-28

## 2020-07-28 NOTE — TELEPHONE ENCOUNTER
Called patient to check if she was trying to get approved for Dexcom/Curt transmitter. Patient said she was told she was approved and [facility] would be contacting her. Put patient on speaker phone for verbal ok to send last 3 OV notes along with form to [facility]. Patient authorized and Rodrigo Fothergill, [place], front lead, listen and heard patient authorize. Will fax form and OV notes. See media for confirmation.

## 2020-08-04 RX ORDER — BLOOD-GLUCOSE METER
KIT MISCELLANEOUS
Qty: 100 STRIP | Refills: 0 | Status: SHIPPED | OUTPATIENT
Start: 2020-08-04 | End: 2020-09-14 | Stop reason: SDUPTHER

## 2020-08-26 ENCOUNTER — VIRTUAL VISIT (OUTPATIENT)
Dept: FAMILY MEDICINE CLINIC | Age: 55
End: 2020-08-26
Payer: COMMERCIAL

## 2020-08-26 ENCOUNTER — TELEPHONE (OUTPATIENT)
Dept: FAMILY MEDICINE CLINIC | Age: 55
End: 2020-08-26

## 2020-08-26 ENCOUNTER — NURSE TRIAGE (OUTPATIENT)
Dept: OTHER | Facility: CLINIC | Age: 55
End: 2020-08-26

## 2020-08-26 PROCEDURE — 3052F HG A1C>EQUAL 8.0%<EQUAL 9.0%: CPT | Performed by: FAMILY MEDICINE

## 2020-08-26 PROCEDURE — 2022F DILAT RTA XM EVC RTNOPTHY: CPT | Performed by: FAMILY MEDICINE

## 2020-08-26 PROCEDURE — 99213 OFFICE O/P EST LOW 20 MIN: CPT | Performed by: FAMILY MEDICINE

## 2020-08-26 PROCEDURE — G8427 DOCREV CUR MEDS BY ELIG CLIN: HCPCS | Performed by: FAMILY MEDICINE

## 2020-08-26 PROCEDURE — 3017F COLORECTAL CA SCREEN DOC REV: CPT | Performed by: FAMILY MEDICINE

## 2020-08-26 ASSESSMENT — ENCOUNTER SYMPTOMS
NAUSEA: 0
WHEEZING: 0
SHORTNESS OF BREATH: 0
VOMITING: 0
COUGH: 1
ABDOMINAL PAIN: 0

## 2020-08-26 NOTE — TELEPHONE ENCOUNTER
----- Message from Hans Jorge L sent at 8/26/2020  9:39 AM EDT -----  Subject: Message to Provider    QUESTIONS  Information for Provider? Patient was holding for nurse triage and it took   too long so she hung up. She is having cold/sinus symptoms. ---------------------------------------------------------------------------  --------------  Maxi MATOS  What is the best way for the office to contact you? OK to leave message on   voicemail  Preferred Call Back Phone Number? [phone]  ---------------------------------------------------------------------------  --------------  SCRIPT ANSWERS  Relationship to Patient?  Self

## 2020-08-26 NOTE — TELEPHONE ENCOUNTER
Called pt to get more information about symptoms, pt states she had chest congestion and low grade fever.  Pt is scheduled for virtual visit with Dr Bella Landers today at 11:30

## 2020-08-26 NOTE — TELEPHONE ENCOUNTER
Reason for Disposition   Wheezing is present    Answer Assessment - Initial Assessment Questions  1. ONSET: \"When did the cough begin? \"       7/25  2. SEVERITY: \"How bad is the cough today? \"       Cough with clear normal colored sputum  3. RESPIRATORY DISTRESS: \"Describe your breathing. \"       Breathing a bit difficult, does not feel like she is getting much oxygen. 4. FEVER: \"Do you have a fever? \" If so, ask: \"What is your temperature, how was it measured, and when did it start? \"      Unable to measure but feels warm  5. HEMOPTYSIS: \"Are you coughing up any blood? \" If so ask: \"How much? \" (flecks, streaks, tablespoons, etc.)      Denies coughing up blood  6. TREATMENT: \"What have you done so far to treat the cough? \" (e.g., meds, fluids, humidifier)      Nothing  7. CARDIAC HISTORY: \"Do you have any history of heart disease? \" (e.g., heart attack, congestive heart failure)       Stents, and coronary artery disease  8. LUNG HISTORY: \"Do you have any history of lung disease? \"  (e.g., pulmonary embolus, asthma, emphysema)      denies  9. PE RISK FACTORS: \"Do you have a history of blood clots? \" (or: recent major surgery, recent prolonged travel, bedridden)      no  10. OTHER SYMPTOMS: \"Do you have any other symptoms? (e.g., runny nose, wheezing, chest pain)        wheezing  11. PREGNANCY: \"Is there any chance you are pregnant? \" \"When was your last menstrual period? \"        No  12. TRAVEL: \"Have you traveled out of the country in the last month? \" (e.g., travel history, exposures)        denies    Protocols used: COUGH-ADULT-[place]    Caller provided care advice and instructed to call back with worsening symptoms. Spoke with Kelley Tapia in [facility] to schedule with Dr. Adelia Trinh.

## 2020-08-26 NOTE — PROGRESS NOTES
Ricardo Virgen MD   insulin glargine (LANTUS SOLOSTAR) 100 UNIT/ML injection pen Inject 20 Units into the skin nightly  Garfield Varma MD   Insulin Syringe-Needle U-100 30G X 5/16\" 1 ML MISC 1 each by Does not apply route daily  Lucas Varma MD   blood glucose test strips (FREESTYLE LITE) strip USE ONE STRIP TO TEST FOUR TIMES A DAY AND AS NEEDED  Lucas Varma MD   Insulin Pen Needle (PEN NEEDLES) 32G X 4 MM MISC Inject 1 each into the skin 4 times daily  Ricardo Virgen MD   fluticasone (FLONASE) 50 MCG/ACT nasal spray 1 spray by Each Nostril route daily  Patient not taking: Reported on 6/15/2020  Garfield Varma MD   insulin glargine (LANTUS SOLOSTAR) 100 UNIT/ML injection pen Inject 25 Units into the skin nightly  Lucas Varma MD   Insulin Pen Needle (PEN NEEDLES) 32G X 4 MM MISC USE ONE NEEDLE DAILY WITH BLANCA Varma MD   insulin glargine (LANTUS SOLOSTAR) 100 UNIT/ML injection pen Inject 24 Units into the skin nightly  Garfield Varma MD   COMFORT ASSIST INSULIN SYRINGE 31G X 5/16\" 1 ML MISC Inject 1 each into the skin 2 times daily  Lucas Varma MD   FREESTYLE LANCETS MISC Inject 1 each into the skin 2 times daily  Garfield Varma MD   Insulin Pen Needle (PEN NEEDLES) 32G X 4 MM MISC ONE NEEDLE - DAILY WITH BLANCA Varma MD   albuterol sulfate HFA (PROAIR HFA) 108 (90 Base) MCG/ACT inhaler Inhale 2 puffs into the lungs every 6 hours as needed for Wheezing  Anand Drought, APRN - CNP   naproxen sodium (ANAPROX DS) 550 MG tablet Take 1 tablet by mouth 2 times daily (with meals)  Patient not taking: Reported on 6/15/2020  Garfield Varma MD   Insulin Syringe-Needle U-100 (B-D INS SYR ULTRAFINE 1CC/31G) 31G X 5/16\" 1 ML MISC Inject 1 each into the skin 3 times daily  Lucas Varma MD   blood glucose test strips (ASCENSIA AUTODISC VI;ONE TOUCH ULTRA TEST VI) strip 1 each by In Vitro route 6 times daily The patient tests six times daily.   She uses the freestyle strips  Ricardo Virgen MD   Insulin Pen Needle (PEN NEEDLES) 32G X 4 MM MISC USE ONE NEW NEEDLE DAILY WITH Juan Luis Thrasher DAVID Varma MD   Blood Glucose Monitoring Suppl (FREESTYLE LITE) UQAN Inject 1 Device into the skin 4 times daily  Rachael Varma MD   baclofen (LIORESAL) 10 MG tablet TK 1 T PO TID PRF MUSCLE SPASMS  Historical Provider, MD   polyethylene glycol (GLYCOLAX) powder MIX 17 GRAMS IN LIQUID OF CHOICE AND TK ONCE D PRF CONSTIPATION  Historical Provider, MD   nitroGLYCERIN (NITROSTAT) 0.3 MG SL tablet   Historical Provider, MD   atorvastatin (LIPITOR) 80 MG tablet   Historical Provider, MD   metoprolol (TOPROL-XL) 50 MG XL tablet Take 50 mg by mouth  Historical Provider, MD   amLODIPine (NORVASC) 5 MG tablet Take 5 mg by mouth  Historical Provider, MD   Blood Glucose Monitoring Suppl (ONE TOUCH ULTRA MINI) W/DEVICE KIT 1 kit by Does not apply route daily as needed  Patricia Westfall MD   Tuberculin-Allergy Syringes (B-D PLASTIPAK SYRINGES ALLERGY) 28G X 1/2\" 1 ML MISC 1 each by Does not apply route daily as needed  Rachael Varma MD   Insulin NPH Isophane & Regular (HUMULIN 70/30 PEN) (70-30) 100 UNIT/ML injection pen Inject 25 Units into the skin  Historical Provider, MD   Glucose Blood (GLUCOMETER ELITE TEST VI) by In Vitro route. The patient needs the One Touch Ultra Glucometer. Would not come up in [facility]. Historical Provider, MD   aspirin 81 MG tablet Take 81 mg by mouth daily. Historical Provider, MD   clopidogrel (PLAVIX) 75 MG tablet Take 75 mg by mouth daily.   Historical Provider, MD       Social History     Tobacco Use    Smoking status: Former Smoker     Packs/day: 1.00     Years: 22.00     Pack years: 22.00     Types: Cigarettes     Start date: 1980     Last attempt to quit: 2002     Years since quittin.3    Smokeless tobacco: Never Used   Substance Use Topics    Alcohol use: No     Alcohol/week: 0.0 standard drinks    Drug use: No        Past Medical History:   Diagnosis Date    Asthma     CAD (coronary artery disease)     function) (limited exam to video visit)          [] No gaze palsy        [] Abnormal-         Skin:        [] No significant exanthematous lesions or discoloration noted on facial skin         [] Abnormal-            Psychiatric:       [x] Normal Affect [] No Hallucinations        [] Abnormal-     Other pertinent observable physical exam findings-     ASSESSMENT/PLAN:  1. Viral URI  Sx tx  Will send for COVID testing  Call if worsening sx,SOB or if sx perssit    2. Type 1 diabetes mellitus with other specified complication ([facility] [address]  Continue current mgt    3. Mild intermittent asthma without complication      4. Coronary artery disease involving native coronary artery of native heart without angina pectoris  No clinical sx of CHF  CXR    5. Cough    - XR CHEST STANDARD (2 VW); Future      Return if symptoms worsen or fail to improve. Alicia Izquierdo is a 54 y.o. female being evaluated by a Virtual Visit (video visit) encounter to address concerns as mentioned above. A caregiver was present when appropriate. Due to this being a TeleHealth encounter (During [facility] public health emergency), evaluation of the following organ systems was limited: Vitals/Constitutional/EENT/Resp/CV/GI//MS/Neuro/Skin/Heme-Lymph-Imm. Pursuant to the emergency declaration under the [address] [place] [address] authority and the [employer] Act, this Virtual Visit was conducted with patient's (and/or legal guardian's) consent, to reduce the patient's risk of exposure to COVID-19 and provide necessary medical care. The patient (and/or legal guardian) has also been advised to contact this office for worsening conditions or problems, and seek emergency medical treatment and/or call 911 if deemed necessary.      Patient identification was verified at the start of the visit: Yes    Total time spent on this encounter: Not billed by time    Services were provided through a video synchronous discussion virtually to substitute for in-person clinic visit. Patient and provider were located at their individual homes. --Bisi Rayo MD on 8/26/2020 at 2:01 PM    An electronic signature was used to authenticate this note. Terrell Mauricio

## 2020-08-27 ENCOUNTER — OFFICE VISIT (OUTPATIENT)
Dept: PRIMARY CARE CLINIC | Age: 55
End: 2020-08-27
Payer: COMMERCIAL

## 2020-08-27 ENCOUNTER — HOSPITAL ENCOUNTER (OUTPATIENT)
Age: 55
Discharge: HOME | End: 2020-08-27
Payer: COMMERCIAL

## 2020-08-27 ENCOUNTER — HOSPITAL ENCOUNTER (OUTPATIENT)
Dept: GENERAL RADIOLOGY | Age: 55
Discharge: HOME | End: 2020-08-27
Payer: COMMERCIAL

## 2020-08-27 PROCEDURE — G8417 CALC BMI ABV UP PARAM F/U: HCPCS | Performed by: NURSE PRACTITIONER

## 2020-08-27 PROCEDURE — 71046 X-RAY EXAM CHEST 2 VIEWS: CPT

## 2020-08-27 PROCEDURE — G8428 CUR MEDS NOT DOCUMENT: HCPCS | Performed by: NURSE PRACTITIONER

## 2020-08-27 PROCEDURE — 99211 OFF/OP EST MAY X REQ PHY/QHP: CPT | Performed by: NURSE PRACTITIONER

## 2020-08-27 NOTE — PATIENT INSTRUCTIONS

## 2020-08-27 NOTE — PROGRESS NOTES
Bea Ganser received a viral test for COVID-19. They were educated on isolation and quarantine as appropriate. For any symptoms, they were directed to seek care from their PCP, given contact information to establish with a doctor, directed to an urgent care or the emergency room.

## 2020-08-28 LAB — SARS-COV-2, NAA: NOT DETECTED

## 2020-09-01 ENCOUNTER — TELEPHONE (OUTPATIENT)
Dept: FAMILY MEDICINE CLINIC | Age: 55
End: 2020-09-01

## 2020-09-01 NOTE — TELEPHONE ENCOUNTER
Patient calling stating she called yesterday for her CXR results and checking if Dr. Mary Carmen Fraire reviewed. She goes to her cardiologist on Friday. I do not see a note from yesterday. We will call back once provider has resulted CXR.

## 2020-09-08 ENCOUNTER — NURSE TRIAGE (OUTPATIENT)
Dept: OTHER | Facility: CLINIC | Age: 55
End: 2020-09-08

## 2020-09-08 NOTE — TELEPHONE ENCOUNTER
Reason for Disposition   Caller has already spoken with another triager and has no further questions    Protocols used: NO CONTACT OR DUPLICATE CONTACT CALL-ADULT-[place]    Pt states she fell and experienced a head injury on Saturday, for which was seen in ER at [facility]. No new symptoms since being seen - she wants to schedule a f/u apt with pcp. She states that she wants to be seen sooner than what Dr Bella Landers can see her , which is Monday, so she will go to an  before being seen by pcp. Soft trx to psc [facility]) to schedule apt. Please do not respond to the triage nurse through this encounter. Any subsequent communication should be directly with the patient.

## 2020-09-14 RX ORDER — BLOOD-GLUCOSE METER
KIT MISCELLANEOUS
Qty: 100 STRIP | Refills: 0 | Status: SHIPPED | OUTPATIENT
Start: 2020-09-14 | End: 2020-10-16 | Stop reason: SDUPTHER

## 2020-09-15 ENCOUNTER — TELEPHONE (OUTPATIENT)
Dept: FAMILY MEDICINE CLINIC | Age: 55
End: 2020-09-15

## 2020-10-14 RX ORDER — FLUOXETINE HYDROCHLORIDE 40 MG/1
40 CAPSULE ORAL DAILY
Qty: 5 CAPSULE | Refills: 0 | Status: SHIPPED | OUTPATIENT
Start: 2020-10-14 | End: 2020-10-16 | Stop reason: SDUPTHER

## 2020-10-14 NOTE — TELEPHONE ENCOUNTER
Patient calling stating she is back in [place]. Needs prozac refill been out a day. She fell recently hit head and got staples. She has her mom home with her from [place]. She is trying to sell condo and move to [facility] and mom will be needing to go to a nursing home. Patient states she hasn't been thinking right unsure if from fall or all the stress. Patient made an appointment for Friday. Can give some pills til seen?       Medication-Fluoxetine  Dose-40mg   Frequency-one daily  LF 6/15/20  [facility]  Next visit this Friday  Last OV 6/19/20

## 2020-10-16 ENCOUNTER — OFFICE VISIT (OUTPATIENT)
Dept: FAMILY MEDICINE CLINIC | Age: 55
End: 2020-10-16
Payer: COMMERCIAL

## 2020-10-16 VITALS
TEMPERATURE: 98.9 F | WEIGHT: 169 LBS | OXYGEN SATURATION: 98 % | SYSTOLIC BLOOD PRESSURE: 122 MMHG | DIASTOLIC BLOOD PRESSURE: 70 MMHG | HEART RATE: 72 BPM | BODY MASS INDEX: 31.1 KG/M2 | HEIGHT: 62 IN

## 2020-10-16 PROCEDURE — G8427 DOCREV CUR MEDS BY ELIG CLIN: HCPCS | Performed by: FAMILY MEDICINE

## 2020-10-16 PROCEDURE — 2022F DILAT RTA XM EVC RTNOPTHY: CPT | Performed by: FAMILY MEDICINE

## 2020-10-16 PROCEDURE — 90686 IIV4 VACC NO PRSV 0.5 ML IM: CPT | Performed by: FAMILY MEDICINE

## 2020-10-16 PROCEDURE — 90471 IMMUNIZATION ADMIN: CPT | Performed by: FAMILY MEDICINE

## 2020-10-16 PROCEDURE — G8482 FLU IMMUNIZE ORDER/ADMIN: HCPCS | Performed by: FAMILY MEDICINE

## 2020-10-16 PROCEDURE — 99214 OFFICE O/P EST MOD 30 MIN: CPT | Performed by: FAMILY MEDICINE

## 2020-10-16 PROCEDURE — G8417 CALC BMI ABV UP PARAM F/U: HCPCS | Performed by: FAMILY MEDICINE

## 2020-10-16 PROCEDURE — 3017F COLORECTAL CA SCREEN DOC REV: CPT | Performed by: FAMILY MEDICINE

## 2020-10-16 PROCEDURE — 1036F TOBACCO NON-USER: CPT | Performed by: FAMILY MEDICINE

## 2020-10-16 PROCEDURE — 3052F HG A1C>EQUAL 8.0%<EQUAL 9.0%: CPT | Performed by: FAMILY MEDICINE

## 2020-10-16 RX ORDER — INSULIN GLARGINE 100 [IU]/ML
20 INJECTION, SOLUTION SUBCUTANEOUS NIGHTLY
Qty: 2 PEN | Refills: 2 | Status: SHIPPED | OUTPATIENT
Start: 2020-10-16 | End: 2021-01-27 | Stop reason: SDUPTHER

## 2020-10-16 RX ORDER — FLUOXETINE HYDROCHLORIDE 40 MG/1
40 CAPSULE ORAL DAILY
Qty: 30 CAPSULE | Refills: 2 | Status: SHIPPED | OUTPATIENT
Start: 2020-10-16 | End: 2021-01-27 | Stop reason: SDUPTHER

## 2020-10-16 RX ORDER — BLOOD-GLUCOSE METER
KIT MISCELLANEOUS
Qty: 100 STRIP | Refills: 0 | Status: SHIPPED | OUTPATIENT
Start: 2020-10-16 | End: 2020-12-07

## 2020-10-18 ASSESSMENT — ENCOUNTER SYMPTOMS
ABDOMINAL PAIN: 0
COUGH: 0
CONSTIPATION: 0
DIARRHEA: 0
SHORTNESS OF BREATH: 0
CHEST TIGHTNESS: 0
BLOOD IN STOOL: 0

## 2020-10-18 NOTE — PROGRESS NOTES
SUBJECTIVE:  Uvaldo Ted   1965   female   No Known Allergies    Chief Complaint   Patient presents with    Anxiety        Patient Active Problem List    Diagnosis Date Noted    Troponin level elevated 02/04/2020     Last Assessment & Plan:   Slight lateral depression, Q waves inferiorly but narrow, poor R wave progression anteriorly on EKG. Troponin 0.100 1st   will cycle Troponins      Trigger finger, right ring finger 07/23/2019    Diabetes mellitus type 2 in obese ([address] 01/31/2017    Angina effort 11/09/2015     Replacing deprecated diagnoses      CAD S/P percutaneous coronary angioplasty 11/09/2015    Coronary artery disease involving native coronary artery of native heart without angina pectoris 10/29/2015     Overview:   ang mLAD + pLCX st'd diffuse D + dLCX dz  '03  ang dRCAst'd LCA same '05  dRCA 35%ISR LCA same EF 60  5/06  MPI nml EF58  8/07      Mild intermittent asthma without complication 78/30/6551    Type 1 diabetes mellitus ([facility] [address]      IDDM      Diabetic retinopathy ([facility] [address]     Asthma     Irritable bowel syndrome     Dysthymia 04/21/2014    HTN (hypertension) 04/21/2014    IDDM (insulin dependent diabetes mellitus) 04/21/2014    CAD (coronary artery disease) 04/21/2014    Dyslipidemia 10/28/2012       HPI   Pt return today for fu on hyperlipidemia, HTN, depression, diabetes, and CAD. She has been doing well on current tx mgt. Denies CP,SOB or myalgias. No ha,change in vision or neurologic sx. She is currently followed by a new cardiologist and has had recent fu. Has been stable on other meds. Denies any episodes of hypoglycemia. Last HgA1c elevated but stable at 8.1. reports some increase in stress due to recent plans to move to [place] but denies sx of depression or anxiety. Sleeping well. No GI complaints.    Past Medical History:   Diagnosis Date    Asthma     CAD (coronary artery disease)     Depression     Diabetes ([facility] [address]     IDDM    Diabetic retinopathy ([facility] [address]  Hyperlipidemia     Hypertension     Irritable bowel syndrome      Social History     Socioeconomic History    Marital status: Single     Spouse name: Not on file    Number of children: Not on file    Years of education: Not on file    Highest education level: Not on file   Occupational History    Not on file   Social Needs    Financial resource strain: Not on file    Food insecurity     Worry: Not on file     Inability: Not on file    Transportation needs     Medical: Not on file     Non-medical: Not on file   Tobacco Use    Smoking status: Former Smoker     Packs/day: 1.00     Years: 22.00     Pack years: 22.00     Types: Cigarettes     Start date: 1980     Last attempt to quit: 2002     Years since quittin.5    Smokeless tobacco: Never Used   Substance and Sexual Activity    Alcohol use: No     Alcohol/week: 0.0 standard drinks    Drug use: No    Sexual activity: Not on file   Lifestyle    Physical activity     Days per week: Not on file     Minutes per session: Not on file    Stress: Not on file   Relationships    Social connections     Talks on phone: Not on file     Gets together: Not on file     Attends Hinduism service: Not on file     Active member of club or organization: Not on file     Attends meetings of clubs or organizations: Not on file     Relationship status: Not on file    Intimate partner violence     Fear of current or ex partner: Not on file     Emotionally abused: Not on file     Physically abused: Not on file     Forced sexual activity: Not on file   Other Topics Concern    Not on file   Social History Narrative    Lives by herself     Family History   Problem Relation Age of Onset    Depression Mother     Heart Disease Mother     Depression Maternal Grandmother     Heart Disease Maternal Grandmother     Heart Disease Maternal Grandfather     Heart Disease Paternal Aunt        Review of Systems   Constitutional: Negative for activity change, Dispense: 30 capsule; Refill: 2    3. Essential hypertension  Refill meds  Intermittent ambulatory BP checks    4. Coronary artery disease involving native coronary artery of native heart without angina pectoris  Reviewed recent fu with cardiology  Continue fu as advised    5. Type 1 diabetes mellitus with other specified complication ([facility] [address]  Reviewed last labs  Encouraged appr diet mgt  Fu labs  Advised increase physical activity    6.  Need for vaccination    - INFLUENZA, QUADV, 3 YRS AND OLDER, IM PF, PREFILL SYR OR SDV, 0.5ML (AFLURIA QUADV, PF)    Pt advised to go to GYN for GYN exam /pap  Mammogram was advised again  Shingrix advised  Orders Placed This Encounter   Procedures    INFLUENZA, QUADV, 3 YRS AND OLDER, IM PF, PREFILL SYR OR SDV, 0.5ML (AFLURIA QUADV, PF)     Current Outpatient Medications   Medication Sig Dispense Refill    FLUoxetine (PROZAC) 40 MG capsule Take 1 capsule by mouth daily 30 capsule 2    blood glucose test strips (FREESTYLE LITE) strip TEST FOUR TIMES A DAY AS NEEDED 100 strip 0    insulin lispro (HUMALOG) 100 UNIT/ML injection vial Inject 5 Units into the skin 3 times daily (before meals) 1 vial 1    insulin glargine (LANTUS SOLOSTAR) 100 UNIT/ML injection pen Inject 20 Units into the skin nightly 2 pen 2    Insulin Syringe-Needle U-100 30G X 5/16\" 1 ML MISC 1 each by Does not apply route daily 100 each 2    rosuvastatin (CRESTOR) 40 MG tablet       insulin aspart protamine-insulin aspart (NOVOLOG MIX 70/30) (70-30) 100 UNIT/ML injection use as directed      benzonatate (TESSALON) 200 MG capsule TK ONE C PO  TID PRF COUGH FOR UP TO 14 DAYS      azithromycin (ZITHROMAX) 250 MG tablet ZPK      blood glucose test strips (FREESTYLE LITE) strip USE ONE STRIP TO TEST FOUR TIMES A DAY AND AS NEEDED 100 strip 1    Insulin Pen Needle (PEN NEEDLES) 32G X 4 MM MISC Inject 1 each into the skin 4 times daily 90 each 0    fluticasone (FLONASE) 50 MCG/ACT nasal spray 1 spray by Each Nostril route daily (Patient not taking: Reported on 6/15/2020) 1 Bottle 0    insulin glargine (LANTUS SOLOSTAR) 100 UNIT/ML injection pen Inject 25 Units into the skin nightly 5 pen 2    Insulin Pen Needle (PEN NEEDLES) 32G X 4 MM MISC USE ONE NEEDLE DAILY WITH BASAGLAR 100 each 0    insulin glargine (LANTUS SOLOSTAR) 100 UNIT/ML injection pen Inject 24 Units into the skin nightly 10 pen 0    COMFORT ASSIST INSULIN SYRINGE 31G X 5/16\" 1 ML MISC Inject 1 each into the skin 2 times daily 100 each 2    FREESTYLE LANCETS MISC Inject 1 each into the skin 2 times daily 100 each 2    Insulin Pen Needle (PEN NEEDLES) 32G X 4 MM MISC ONE NEEDLE - DAILY WITH BASAGLAR 100 each 0    albuterol sulfate HFA (PROAIR HFA) 108 (90 Base) MCG/ACT inhaler Inhale 2 puffs into the lungs every 6 hours as needed for Wheezing 1 Inhaler 0    naproxen sodium (ANAPROX DS) 550 MG tablet Take 1 tablet by mouth 2 times daily (with meals) (Patient not taking: Reported on 6/15/2020) 30 tablet 0    Insulin Syringe-Needle U-100 (B-D INS SYR ULTRAFINE 1CC/31G) 31G X 5/16\" 1 ML MISC Inject 1 each into the skin 3 times daily 150 each 1    blood glucose test strips (ASCENSIA AUTODISC VI;ONE TOUCH ULTRA TEST VI) strip 1 each by In Vitro route 6 times daily The patient tests six times daily.   She uses the freestyle strips 180 each 2    Insulin Pen Needle (PEN NEEDLES) 32G X 4 MM MISC USE ONE NEW NEEDLE DAILY WITH BASAGLAR 100 each 0    Blood Glucose Monitoring Suppl (FREESTYLE LITE) QUAN Inject 1 Device into the skin 4 times daily 1 Device 0    baclofen (LIORESAL) 10 MG tablet TK 1 T PO TID PRF MUSCLE SPASMS  0    polyethylene glycol (GLYCOLAX) powder MIX 17 GRAMS IN LIQUID OF CHOICE AND TK ONCE D PRF CONSTIPATION  1    nitroGLYCERIN (NITROSTAT) 0.3 MG SL tablet       atorvastatin (LIPITOR) 80 MG tablet       metoprolol (TOPROL-XL) 50 MG XL tablet Take 50 mg by mouth      amLODIPine (NORVASC) 5 MG tablet Take 5 mg by mouth      Blood Glucose Monitoring Suppl (ONE TOUCH ULTRA MINI) W/DEVICE KIT 1 kit by Does not apply route daily as needed 1 kit 0    Tuberculin-Allergy Syringes (B-D PLASTIPAK SYRINGES ALLERGY) 28G X 1/2\" 1 ML MISC 1 each by Does not apply route daily as needed 50 each 1    Insulin NPH Isophane & Regular (HUMULIN 70/30 PEN) (70-30) 100 UNIT/ML injection pen Inject 25 Units into the skin      Glucose Blood (GLUCOMETER ELITE TEST VI) by In Vitro route. The patient needs the One Touch Ultra Glucometer. Would not come up in epic.  aspirin 81 MG tablet Take 81 mg by mouth daily.  clopidogrel (PLAVIX) 75 MG tablet Take 75 mg by mouth daily. No current facility-administered medications for this visit. Return in about 3 months (around 1/16/2021), or if symptoms worsen or fail to improve, for diabetes, HTN, hyperlipidmeia.     Jean Carlos Jansen MD

## 2020-10-19 NOTE — TELEPHONE ENCOUNTER
Pharmacy called because Insulin syringes came across as 30 adin but they do not carry that and need to change it to 31 adin. Please advise.

## 2020-10-20 ENCOUNTER — TELEPHONE (OUTPATIENT)
Dept: FAMILY MEDICINE CLINIC | Age: 55
End: 2020-10-20

## 2020-10-20 NOTE — TELEPHONE ENCOUNTER
Medication rx approved yesterday. . Rx script ready for  in office Lm on VM advising to come to office for

## 2020-10-20 NOTE — TELEPHONE ENCOUNTER
Spoke with the Patient about pen needles sent to [facility] yesterday 10/19/2020. Patient has no further questions and or concerns thus far.

## 2020-10-20 NOTE — TELEPHONE ENCOUNTER
Patient unavailable, left message on  to return call to the office. Pen needles sent to pharmacy yesterday 10/19/2020.

## 2020-10-20 NOTE — TELEPHONE ENCOUNTER
Spoke with Aime Alexander from [address] [place] [address] about Patient receiving 5 tablets of Prozac and Rx for this was sent into the pharmacy 10/16/2020. Pharmacy states they would refill the new Rx. Patient notified. General verbal given general pen needles. No further questions and or concerns thus far.

## 2020-11-06 ENCOUNTER — OFFICE VISIT (OUTPATIENT)
Dept: FAMILY MEDICINE CLINIC | Age: 55
End: 2020-11-06
Payer: COMMERCIAL

## 2020-11-06 ENCOUNTER — HOSPITAL ENCOUNTER (OUTPATIENT)
Dept: GENERAL RADIOLOGY | Age: 55
Discharge: HOME | End: 2020-11-06
Payer: COMMERCIAL

## 2020-11-06 ENCOUNTER — NURSE TRIAGE (OUTPATIENT)
Dept: OTHER | Facility: CLINIC | Age: 55
End: 2020-11-06

## 2020-11-06 VITALS
WEIGHT: 171.6 LBS | OXYGEN SATURATION: 99 % | TEMPERATURE: 97.3 F | SYSTOLIC BLOOD PRESSURE: 126 MMHG | BODY MASS INDEX: 31.58 KG/M2 | HEART RATE: 65 BPM | HEIGHT: 62 IN | DIASTOLIC BLOOD PRESSURE: 84 MMHG

## 2020-11-06 DIAGNOSIS — R10.11 CHRONIC RUQ PAIN: ICD-10-CM

## 2020-11-06 DIAGNOSIS — G89.29 CHRONIC RUQ PAIN: ICD-10-CM

## 2020-11-06 LAB
CHP ED QC CHECK: NORMAL
GLUCOSE BLD-MCNC: 341 MG/DL

## 2020-11-06 PROCEDURE — 99214 OFFICE O/P EST MOD 30 MIN: CPT | Performed by: FAMILY MEDICINE

## 2020-11-06 PROCEDURE — 1036F TOBACCO NON-USER: CPT | Performed by: FAMILY MEDICINE

## 2020-11-06 PROCEDURE — 3017F COLORECTAL CA SCREEN DOC REV: CPT | Performed by: FAMILY MEDICINE

## 2020-11-06 PROCEDURE — G8427 DOCREV CUR MEDS BY ELIG CLIN: HCPCS | Performed by: FAMILY MEDICINE

## 2020-11-06 PROCEDURE — G8482 FLU IMMUNIZE ORDER/ADMIN: HCPCS | Performed by: FAMILY MEDICINE

## 2020-11-06 PROCEDURE — 3052F HG A1C>EQUAL 8.0%<EQUAL 9.0%: CPT | Performed by: FAMILY MEDICINE

## 2020-11-06 PROCEDURE — G8417 CALC BMI ABV UP PARAM F/U: HCPCS | Performed by: FAMILY MEDICINE

## 2020-11-06 PROCEDURE — 2022F DILAT RTA XM EVC RTNOPTHY: CPT | Performed by: FAMILY MEDICINE

## 2020-11-06 PROCEDURE — 71046 X-RAY EXAM CHEST 2 VIEWS: CPT

## 2020-11-06 PROCEDURE — 82962 GLUCOSE BLOOD TEST: CPT | Performed by: FAMILY MEDICINE

## 2020-11-06 NOTE — TELEPHONE ENCOUNTER
Patient called pre-service center [facility]) [address] with red flag complaint. Brief description of triage: flank pain    Triage indicates for patient to be seen in office today    Care advice provided, patient verbalizes understanding; denies any other questions or concerns; instructed to call back for any new or worsening symptoms. Writer provided warm transfer to [facility] for appointment scheduling. Attention Provider: Thank you for allowing me to participate in the care of your patient. The patient was connected to triage in response to information provided to the [facility]. Please do not respond through this encounter as the response is not directed to a shared pool. Reason for Disposition   Patient wants to be seen    Answer Assessment - Initial Assessment Questions  1. LOCATION: \"Where does it hurt? \" (e.g., left, right)      Right side below ribcage    2. ONSET: \"When did the pain start? \"      About a week ago    3. SEVERITY: \"How bad is the pain? \" (e.g., Scale 1-10; mild, moderate, or severe)    - MILD (1-3): doesn't interfere with normal activities     - MODERATE (4-7): interferes with normal activities or awakens from sleep     - SEVERE (8-10): excruciating pain and patient unable to do normal activities (stays in bed)        3/10    4. PATTERN: \"Does the pain come and go, or is it constant? \"       Intermittent, presents most intensely in the morning or while laying down    5. CAUSE: \"What do you think is causing the pain? \"      Stress    6. OTHER SYMPTOMS:  \"Do you have any other symptoms? \" (e.g., fever, abdominal pain, vomiting, leg weakness, burning with urination, blood in urine)      No    7. PREGNANCY:  \"Is there any chance you are pregnant? \" \"When was your last menstrual period? \"      N/A    Protocols used:  FLANK PAIN-ADULT-[place]

## 2020-11-09 ENCOUNTER — TELEPHONE (OUTPATIENT)
Dept: FAMILY MEDICINE CLINIC | Age: 55
End: 2020-11-09

## 2020-11-18 ASSESSMENT — ENCOUNTER SYMPTOMS
DIARRHEA: 0
CHEST TIGHTNESS: 0
BLOOD IN STOOL: 0
COUGH: 0
CONSTIPATION: 0
SHORTNESS OF BREATH: 0
NAUSEA: 0
ABDOMINAL PAIN: 0
VOMITING: 0

## 2020-11-18 NOTE — PROGRESS NOTES
SUBJECTIVE:  Radha Sahu   1965   female   No Known Allergies    Chief Complaint   Patient presents with    Flank Pain     right side        Patient Active Problem List    Diagnosis Date Noted    Troponin level elevated 02/04/2020     Last Assessment & Plan:   Slight lateral depression, Q waves inferiorly but narrow, poor R wave progression anteriorly on EKG. Troponin 0.100 1st   will cycle Troponins      Trigger finger, right ring finger 07/23/2019    Diabetes mellitus type 2 in obese ([address] 01/31/2017    Angina effort 11/09/2015     Replacing deprecated diagnoses      CAD S/P percutaneous coronary angioplasty 11/09/2015    Coronary artery disease involving native coronary artery of native heart without angina pectoris 10/29/2015     Overview:   ang mLAD + pLCX st'd diffuse D + dLCX dz  '03  ang dRCAst'd LCA same '05  dRCA 35%ISR LCA same EF 60  5/06  MPI nml EF58  8/07      Mild intermittent asthma without complication 17/96/8668    Type 1 diabetes mellitus ([address]      IDDM      Diabetic retinopathy ([facility]     Asthma     Irritable bowel syndrome     Dysthymia 04/21/2014    HTN (hypertension) 04/21/2014    IDDM (insulin dependent diabetes mellitus) 04/21/2014    CAD (coronary artery disease) 04/21/2014    Dyslipidemia 10/28/2012       HPI   Pt with hx of diabetes and CAD is here today co intermittent RUQ abdominal pain. Reports she has been noticing this for several weeks . She had similar complaints in 2017 . She had a RUQ US at that time and GI fu. Pt has a hx of CAD . She has had a recent evaluation by cardiology. Denies CP,SOB or orthopnea. No change in sx with physical exertion . No change with food intake. Denies N,V or diarrhea. No change in appetite. No URI sx, cough or SOB. No injuries.   Past Medical History:   Diagnosis Date    Asthma     CAD (coronary artery disease)     Depression     Diabetes ([facility]     IDDM    Diabetic retinopathy ([facility]     Hyperlipidemia     Hypertension     Irritable bowel syndrome      Social History     Socioeconomic History    Marital status: Single     Spouse name: Not on file    Number of children: Not on file    Years of education: Not on file    Highest education level: Not on file   Occupational History    Not on file   Social Needs    Financial resource strain: Not on file    Food insecurity     Worry: Not on file     Inability: Not on file    Transportation needs     Medical: Not on file     Non-medical: Not on file   Tobacco Use    Smoking status: Former Smoker     Packs/day: 1.00     Years: 22.00     Pack years: 22.00     Types: Cigarettes     Start date: 1980     Last attempt to quit: 2002     Years since quittin.5    Smokeless tobacco: Never Used   Substance and Sexual Activity    Alcohol use: No     Alcohol/week: 0.0 standard drinks    Drug use: No    Sexual activity: Not on file   Lifestyle    Physical activity     Days per week: Not on file     Minutes per session: Not on file    Stress: Not on file   Relationships    Social connections     Talks on phone: Not on file     Gets together: Not on file     Attends Sikhism service: Not on file     Active member of club or organization: Not on file     Attends meetings of clubs or organizations: Not on file     Relationship status: Not on file    Intimate partner violence     Fear of current or ex partner: Not on file     Emotionally abused: Not on file     Physically abused: Not on file     Forced sexual activity: Not on file   Other Topics Concern    Not on file   Social History Narrative    Lives by herself     Family History   Problem Relation Age of Onset    Depression Mother     Heart Disease Mother     Depression Maternal Grandmother     Heart Disease Maternal Grandmother     Heart Disease Maternal Grandfather     Heart Disease Paternal Aunt        Review of Systems   Constitutional: Negative for activity change, appetite change and unexpected weight change. Respiratory: Negative for cough, chest tightness and shortness of breath. Cardiovascular: Negative for chest pain and palpitations. Gastrointestinal: Negative for abdominal pain, blood in stool, constipation, diarrhea, nausea and vomiting. Intermittent RUQ discomfort   Musculoskeletal: Negative for arthralgias and myalgias. Skin: Negative for rash. Neurological: Negative for light-headedness and headaches. Hematological: Negative for adenopathy. Does not bruise/bleed easily. Psychiatric/Behavioral: Negative for dysphoric mood. OBJECTIVE:  /84   Pulse 65   Temp 97.3 °F (36.3 °C)   Ht 5' 2\" (1.575 m)   Wt 171 lb 9.6 oz (77.8 kg)   LMP 08/26/2016   SpO2 99%   BMI 31.39 kg/m²   Physical Exam  Vitals signs and nursing note reviewed. Constitutional:       Appearance: She is well-developed. Eyes:      Pupils: Pupils are equal, round, and reactive to light. Neck:      Musculoskeletal: Normal range of motion and neck supple. Thyroid: No thyromegaly. Vascular: No JVD. Cardiovascular:      Rate and Rhythm: Normal rate and regular rhythm. Pulmonary:      Effort: Pulmonary effort is normal.      Breath sounds: Normal breath sounds. No wheezing or rales. Abdominal:      General: Bowel sounds are normal. There is no distension. Palpations: Abdomen is soft. Tenderness: There is no abdominal tenderness. There is no guarding. Skin:     Findings: No rash. Neurological:      Mental Status: She is alert and oriented to person, place, and time. Psychiatric:         Behavior: Behavior normal.         Thought Content: Thought content normal.         ASSESSMENT/PLAN:    1. Type 1 diabetes mellitus with other specified complication (HCC)  Continue current mgt  - POCT Glucose    2. Chronic RUQ pain    - US GALLBLADDER RUQ; Future  - XR CHEST STANDARD (2 VW); Future  To GI if above is nl  Reviewed last RUQ US    3.  Coronary artery disease involving native coronary artery of native heart without angina pectoris  Reviewed recent fu with cardiology      Orders Placed This Encounter   Procedures    US GALLBLADDER RUQ     This procedure can be scheduled via [facility]. Access your [facility] account by visiting [email or web address].      Standing Status:   Future     Standing Expiration Date:   11/6/2021    XR CHEST STANDARD (2 VW)     Standing Status:   Future     Number of Occurrences:   1     Standing Expiration Date:   11/6/2021    POCT Glucose     Current Outpatient Medications   Medication Sig Dispense Refill    Insulin Pen Needle 31G X 5 MM MISC 1 each by Does not apply route daily 100 each 1    FLUoxetine (PROZAC) 40 MG capsule Take 1 capsule by mouth daily 30 capsule 2    blood glucose test strips (FREESTYLE LITE) strip TEST FOUR TIMES A DAY AS NEEDED 100 strip 0    insulin lispro (HUMALOG) 100 UNIT/ML injection vial Inject 5 Units into the skin 3 times daily (before meals) 1 vial 1    insulin glargine (LANTUS SOLOSTAR) 100 UNIT/ML injection pen Inject 20 Units into the skin nightly 2 pen 2    Insulin Syringe-Needle U-100 30G X 5/16\" 1 ML MISC 1 each by Does not apply route daily 100 each 2    rosuvastatin (CRESTOR) 40 MG tablet       insulin aspart protamine-insulin aspart (NOVOLOG MIX 70/30) (70-30) 100 UNIT/ML injection use as directed      benzonatate (TESSALON) 200 MG capsule TK ONE C PO  TID PRF COUGH FOR UP TO 14 DAYS      azithromycin (ZITHROMAX) 250 MG tablet ZPK      blood glucose test strips (FREESTYLE LITE) strip USE ONE STRIP TO TEST FOUR TIMES A DAY AND AS NEEDED 100 strip 1    Insulin Pen Needle (PEN NEEDLES) 32G X 4 MM MISC Inject 1 each into the skin 4 times daily 90 each 0    fluticasone (FLONASE) 50 MCG/ACT nasal spray 1 spray by Each Nostril route daily (Patient not taking: Reported on 6/15/2020) 1 Bottle 0    insulin glargine (LANTUS SOLOSTAR) 100 UNIT/ML injection pen Inject 25 Units into the skin nightly 5 pen 2    Insulin 1 each by Does not apply route daily as needed 50 each 1    Insulin NPH Isophane & Regular (HUMULIN 70/30 PEN) (70-30) 100 UNIT/ML injection pen Inject 25 Units into the skin      Glucose Blood (GLUCOMETER ELITE TEST VI) by In Vitro route. The patient needs the One Touch Ultra Glucometer. Would not come up in epic.  aspirin 81 MG tablet Take 81 mg by mouth daily.  clopidogrel (PLAVIX) 75 MG tablet Take 75 mg by mouth daily. No current facility-administered medications for this visit. Return if symptoms worsen or fail to improve.     Nilam Vargas MD

## 2020-12-07 ENCOUNTER — TELEPHONE (OUTPATIENT)
Dept: FAMILY MEDICINE CLINIC | Age: 55
End: 2020-12-07

## 2020-12-07 RX ORDER — BLOOD-GLUCOSE METER
KIT MISCELLANEOUS
Qty: 100 EACH | Refills: 1 | Status: SHIPPED | OUTPATIENT
Start: 2020-12-07 | End: 2021-01-27 | Stop reason: SDUPTHER

## 2020-12-07 NOTE — TELEPHONE ENCOUNTER
Patient calling used her last test strip this morning. Would like a script for Freesytle Lite test strips send to [facility] [address]. Patient test 4 times a day. This was filled already at 11:45am today. Advised patient.

## 2020-12-08 RX ORDER — PEN NEEDLE, DIABETIC 30 GX3/16"
NEEDLE, DISPOSABLE MISCELLANEOUS
Qty: 100 EACH | Refills: 1 | Status: SHIPPED | OUTPATIENT
Start: 2020-12-08 | End: 2021-01-27 | Stop reason: SDUPTHER

## 2020-12-14 ENCOUNTER — TELEPHONE (OUTPATIENT)
Dept: FAMILY MEDICINE CLINIC | Age: 55
End: 2020-12-14

## 2020-12-14 NOTE — TELEPHONE ENCOUNTER
Pt requesting a refill of the insulin lispro 100 unit injection vial 5 units in skin 3x daily. Uses [facility] [address].   She broke the vial she had

## 2021-01-27 ENCOUNTER — TELEPHONE (OUTPATIENT)
Dept: FAMILY MEDICINE CLINIC | Age: 56
End: 2021-01-27

## 2021-01-27 ENCOUNTER — OFFICE VISIT (OUTPATIENT)
Dept: FAMILY MEDICINE CLINIC | Age: 56
End: 2021-01-27
Payer: COMMERCIAL

## 2021-01-27 VITALS
BODY MASS INDEX: 32.2 KG/M2 | HEART RATE: 69 BPM | WEIGHT: 175 LBS | SYSTOLIC BLOOD PRESSURE: 118 MMHG | DIASTOLIC BLOOD PRESSURE: 70 MMHG | HEIGHT: 62 IN | OXYGEN SATURATION: 98 % | TEMPERATURE: 96.7 F

## 2021-01-27 DIAGNOSIS — E78.5 HYPERLIPIDEMIA, UNSPECIFIED HYPERLIPIDEMIA TYPE: ICD-10-CM

## 2021-01-27 DIAGNOSIS — J45.20 MILD INTERMITTENT ASTHMA WITHOUT COMPLICATION: ICD-10-CM

## 2021-01-27 DIAGNOSIS — I25.10 CORONARY ARTERY DISEASE INVOLVING NATIVE CORONARY ARTERY OF NATIVE HEART WITHOUT ANGINA PECTORIS: ICD-10-CM

## 2021-01-27 DIAGNOSIS — I10 ESSENTIAL HYPERTENSION: ICD-10-CM

## 2021-01-27 DIAGNOSIS — F32.A DEPRESSION, UNSPECIFIED DEPRESSION TYPE: ICD-10-CM

## 2021-01-27 DIAGNOSIS — E10.69 TYPE 1 DIABETES MELLITUS WITH OTHER SPECIFIED COMPLICATION (HCC): Primary | ICD-10-CM

## 2021-01-27 DIAGNOSIS — R10.11 CHRONIC RUQ PAIN: ICD-10-CM

## 2021-01-27 DIAGNOSIS — G89.29 CHRONIC RUQ PAIN: ICD-10-CM

## 2021-01-27 LAB
BILIRUBIN, POC: NORMAL
BLOOD URINE, POC: NORMAL
CHP ED QC CHECK: NORMAL
CLARITY, POC: CLEAR
COLOR, POC: YELLOW
GLUCOSE BLD-MCNC: 370 MG/DL
GLUCOSE URINE, POC: NORMAL
KETONES, POC: NORMAL
LEUKOCYTE EST, POC: NORMAL
NITRITE, POC: NORMAL
PH, POC: 5.5
PROTEIN, POC: NORMAL
SPECIFIC GRAVITY, POC: 1.01
UROBILINOGEN, POC: 0.2

## 2021-01-27 PROCEDURE — 1036F TOBACCO NON-USER: CPT | Performed by: FAMILY MEDICINE

## 2021-01-27 PROCEDURE — 82962 GLUCOSE BLOOD TEST: CPT | Performed by: FAMILY MEDICINE

## 2021-01-27 PROCEDURE — G8417 CALC BMI ABV UP PARAM F/U: HCPCS | Performed by: FAMILY MEDICINE

## 2021-01-27 PROCEDURE — 3046F HEMOGLOBIN A1C LEVEL >9.0%: CPT | Performed by: FAMILY MEDICINE

## 2021-01-27 PROCEDURE — G8482 FLU IMMUNIZE ORDER/ADMIN: HCPCS | Performed by: FAMILY MEDICINE

## 2021-01-27 PROCEDURE — 2022F DILAT RTA XM EVC RTNOPTHY: CPT | Performed by: FAMILY MEDICINE

## 2021-01-27 PROCEDURE — 99214 OFFICE O/P EST MOD 30 MIN: CPT | Performed by: FAMILY MEDICINE

## 2021-01-27 PROCEDURE — 81002 URINALYSIS NONAUTO W/O SCOPE: CPT | Performed by: FAMILY MEDICINE

## 2021-01-27 PROCEDURE — 3017F COLORECTAL CA SCREEN DOC REV: CPT | Performed by: FAMILY MEDICINE

## 2021-01-27 PROCEDURE — G8427 DOCREV CUR MEDS BY ELIG CLIN: HCPCS | Performed by: FAMILY MEDICINE

## 2021-01-27 RX ORDER — PEN NEEDLE, DIABETIC 30 GX3/16"
NEEDLE, DISPOSABLE MISCELLANEOUS
Qty: 100 EACH | Refills: 1 | Status: SHIPPED | OUTPATIENT
Start: 2021-01-27 | End: 2021-11-16 | Stop reason: ALTCHOICE

## 2021-01-27 RX ORDER — INSULIN GLARGINE 100 [IU]/ML
24 INJECTION, SOLUTION SUBCUTANEOUS NIGHTLY
Qty: 2 PEN | Refills: 2 | Status: SHIPPED | OUTPATIENT
Start: 2021-01-27 | End: 2021-04-27 | Stop reason: SDUPTHER

## 2021-01-27 RX ORDER — BLOOD-GLUCOSE METER
KIT MISCELLANEOUS
Qty: 100 EACH | Refills: 1 | Status: SHIPPED | OUTPATIENT
Start: 2021-01-27 | End: 2021-04-27 | Stop reason: SDUPTHER

## 2021-01-27 RX ORDER — FLUOXETINE HYDROCHLORIDE 40 MG/1
40 CAPSULE ORAL DAILY
Qty: 30 CAPSULE | Refills: 2 | Status: SHIPPED | OUTPATIENT
Start: 2021-01-27 | End: 2021-04-27 | Stop reason: SDUPTHER

## 2021-01-27 NOTE — TELEPHONE ENCOUNTER
[employer] calling asking for last 30 days BS log. Per Kwesi Barker if patient brought in it would be under media as BS LOg. Not seeing patient log. He will contact patient for it.

## 2021-01-28 ASSESSMENT — ENCOUNTER SYMPTOMS
CONSTIPATION: 0
DIARRHEA: 0
BLOOD IN STOOL: 0
COUGH: 0
CHEST TIGHTNESS: 0
SHORTNESS OF BREATH: 0
ABDOMINAL PAIN: 0

## 2021-01-29 NOTE — PROGRESS NOTES
SUBJECTIVE:  Capri Alfaro   1965   female   No Known Allergies    Chief Complaint   Patient presents with    Diabetes    Hyperlipidemia        Patient Active Problem List    Diagnosis Date Noted    Troponin level elevated 02/04/2020     Last Assessment & Plan:   Slight lateral depression, Q waves inferiorly but narrow, poor R wave progression anteriorly on EKG.     Troponin 0.100 1st   will cycle Troponins      Trigger finger, right ring finger 07/23/2019    Diabetes mellitus type 2 in obese ([facility] 01/31/2017    Angina effort 11/09/2015     Replacing deprecated diagnoses      CAD S/P percutaneous coronary angioplasty 11/09/2015    Coronary artery disease involving native coronary artery of native heart without angina pectoris 10/29/2015     Overview:   ang mLAD + pLCX st'd diffuse D + dLCX dz  '03  ang dRCAst'd LCA same '05  dRCA 35%ISR LCA same EF 60  5/06  MPI nml EF58  8/07      Mild intermittent asthma without complication 97/59/1813    Type 1 diabetes mellitus ([facility]      IDDM      Diabetic retinopathy ([facility]     Asthma     Irritable bowel syndrome     Dysthymia 04/21/2014    HTN (hypertension) 04/21/2014    IDDM (insulin dependent diabetes mellitus) 04/21/2014    CAD (coronary artery disease) 04/21/2014    Dyslipidemia 10/28/2012       HPI Talks on phone: Not on file     Gets together: Not on file     Attends Moravian service: Not on file     Active member of club or organization: Not on file     Attends meetings of clubs or organizations: Not on file     Relationship status: Not on file    Intimate partner violence     Fear of current or ex partner: Not on file     Emotionally abused: Not on file     Physically abused: Not on file     Forced sexual activity: Not on file   Other Topics Concern    Not on file   Social History Narrative    Lives by herself     Family History   Problem Relation Age of Onset    Depression Mother     Heart Disease Mother     Depression Maternal Grandmother     Heart Disease Maternal Grandmother     Heart Disease Maternal Grandfather     Heart Disease Paternal Aunt        Review of Systems   Constitutional: Negative for activity change, appetite change and unexpected weight change. Respiratory: Negative for cough, chest tightness and shortness of breath. Cardiovascular: Negative for chest pain, palpitations and leg swelling. Gastrointestinal: Negative for abdominal pain, blood in stool, constipation and diarrhea. Musculoskeletal: Negative for arthralgias and myalgias. Skin: Negative for rash. Neurological: Negative for light-headedness and headaches. Hematological: Negative for adenopathy. Does not bruise/bleed easily. Psychiatric/Behavioral: Negative for dysphoric mood, sleep disturbance and suicidal ideas. The patient is not nervous/anxious. OBJECTIVE:  /70   Pulse 69   Temp 96.7 °F (35.9 °C)   Ht 5' 2\" (1.575 m)   Wt 175 lb (79.4 kg)   LMP 08/26/2016   SpO2 98%   BMI 32.01 kg/m²   Physical Exam  Vitals signs and nursing note reviewed. Constitutional:       Appearance: She is well-developed. Eyes:      Pupils: Pupils are equal, round, and reactive to light. Neck:      Musculoskeletal: Normal range of motion and neck supple. Thyroid: No thyromegaly. Vascular: No JVD. Cardiovascular:      Rate and Rhythm: Normal rate and regular rhythm. Pulmonary:      Effort: Pulmonary effort is normal.      Breath sounds: Normal breath sounds. Abdominal:      General: Bowel sounds are normal. There is no distension. Palpations: Abdomen is soft. Tenderness: There is no abdominal tenderness. There is no guarding. Skin:     Findings: No rash. Neurological:      Mental Status: She is alert and oriented to person, place, and time. Psychiatric:         Behavior: Behavior normal.         Thought Content: Thought content normal.         ASSESSMENT/PLAN:    1. Type 1 diabetes mellitus with other specified complication (HCC)  Refill meds  Intermittent ambulatory BS checks  Labs  appr diet mgt  - POCT Urinalysis no Micro  - POCT Glucose    2. Depression, unspecified depression type    - FLUoxetine (PROZAC) 40 MG capsule; Take 1 capsule by mouth daily  Dispense: 30 capsule; Refill: 2    3. Essential hypertension  Refill meds  Intermittent ambulatory BP checks  DASH diet    4. Coronary artery disease involving native coronary artery of native heart without angina pectoris  Reviewed current mgt and last cardiology fu  Continue fu as advised    5. Hyperlipidemia, unspecified hyperlipidemia type  Pt continues on Crestor  labs    6. Chronic RUQ pain  RUQ US . Pt will call to schedule    7.  Mild intermittent asthma without complication  No sx  Prn Alb    Mammogram and pap/ GYN exam advised  Shingrix advised    Orders Placed This Encounter   Procedures    POCT Urinalysis no Micro    POCT Glucose     Current Outpatient Medications   Medication Sig Dispense Refill    insulin lispro (HUMALOG) 100 UNIT/ML injection vial Inject 5 Units into the skin 3 times daily (before meals) 10 mL 0    Insulin Pen Needle (PEN NEEDLES) 32G X 4 MM MISC USE ONE NEEDLE DAILY WITH BASAGLAR 100 each 1  blood glucose test strips (FREESTYLE LITE) strip USE 1 STRIP TO CHECK GLUCOSE 4 TIMES DAILY AS NEEDED 100 each 1    FLUoxetine (PROZAC) 40 MG capsule Take 1 capsule by mouth daily 30 capsule 2    insulin glargine (LANTUS SOLOSTAR) 100 UNIT/ML injection pen Inject 24 Units into the skin nightly 2 pen 2    Insulin Pen Needle 31G X 5 MM MISC 1 each by Does not apply route daily 100 each 1    Insulin Syringe-Needle U-100 30G X 5/16\" 1 ML MISC 1 each by Does not apply route daily 100 each 2    rosuvastatin (CRESTOR) 40 MG tablet       insulin aspart protamine-insulin aspart (NOVOLOG MIX 70/30) (70-30) 100 UNIT/ML injection use as directed      benzonatate (TESSALON) 200 MG capsule TK ONE C PO  TID PRF COUGH FOR UP TO 14 DAYS      azithromycin (ZITHROMAX) 250 MG tablet ZPK      blood glucose test strips (FREESTYLE LITE) strip USE ONE STRIP TO TEST FOUR TIMES A DAY AND AS NEEDED 100 strip 1    Insulin Pen Needle (PEN NEEDLES) 32G X 4 MM MISC Inject 1 each into the skin 4 times daily 90 each 0    fluticasone (FLONASE) 50 MCG/ACT nasal spray 1 spray by Each Nostril route daily (Patient not taking: Reported on 6/15/2020) 1 Bottle 0    insulin glargine (LANTUS SOLOSTAR) 100 UNIT/ML injection pen Inject 25 Units into the skin nightly 5 pen 2    insulin glargine (LANTUS SOLOSTAR) 100 UNIT/ML injection pen Inject 24 Units into the skin nightly 10 pen 0    COMFORT ASSIST INSULIN SYRINGE 31G X 5/16\" 1 ML MISC Inject 1 each into the skin 2 times daily 100 each 2    FREESTYLE LANCETS MISC Inject 1 each into the skin 2 times daily 100 each 2    Insulin Pen Needle (PEN NEEDLES) 32G X 4 MM MISC ONE NEEDLE - DAILY WITH BASAGLAR 100 each 0    albuterol sulfate HFA (PROAIR HFA) 108 (90 Base) MCG/ACT inhaler Inhale 2 puffs into the lungs every 6 hours as needed for Wheezing 1 Inhaler 0  naproxen sodium (ANAPROX DS) 550 MG tablet Take 1 tablet by mouth 2 times daily (with meals) (Patient not taking: Reported on 6/15/2020) 30 tablet 0    Insulin Syringe-Needle U-100 (B-D INS SYR ULTRAFINE 1CC/31G) 31G X 5/16\" 1 ML MISC Inject 1 each into the skin 3 times daily 150 each 1    blood glucose test strips (ASCENSIA AUTODISC VI;ONE TOUCH ULTRA TEST VI) strip 1 each by In Vitro route 6 times daily The patient tests six times daily. She uses the freestyle strips 180 each 2    Insulin Pen Needle (PEN NEEDLES) 32G X 4 MM MISC USE ONE NEW NEEDLE DAILY WITH BASAGLAR 100 each 0    Blood Glucose Monitoring Suppl (FREESTYLE LITE) QUAN Inject 1 Device into the skin 4 times daily 1 Device 0    baclofen (LIORESAL) 10 MG tablet TK 1 T PO TID PRF MUSCLE SPASMS  0    polyethylene glycol (GLYCOLAX) powder MIX 17 GRAMS IN LIQUID OF CHOICE AND TK ONCE D PRF CONSTIPATION  1    nitroGLYCERIN (NITROSTAT) 0.3 MG SL tablet       atorvastatin (LIPITOR) 80 MG tablet       metoprolol (TOPROL-XL) 50 MG XL tablet Take 50 mg by mouth      amLODIPine (NORVASC) 5 MG tablet Take 5 mg by mouth      Blood Glucose Monitoring Suppl (ONE TOUCH ULTRA MINI) W/DEVICE KIT 1 kit by Does not apply route daily as needed 1 kit 0    Tuberculin-Allergy Syringes (B-D PLASTIPAK SYRINGES ALLERGY) 28G X 1/2\" 1 ML MISC 1 each by Does not apply route daily as needed 50 each 1    Insulin NPH Isophane & Regular (HUMULIN 70/30 PEN) (70-30) 100 UNIT/ML injection pen Inject 25 Units into the skin      Glucose Blood (GLUCOMETER ELITE TEST VI) by In Vitro route. The patient needs the One Touch Ultra Glucometer. Would not come up in epic.  aspirin 81 MG tablet Take 81 mg by mouth daily.  clopidogrel (PLAVIX) 75 MG tablet Take 75 mg by mouth daily. No current facility-administered medications for this visit. Return in about 3 months (around 4/27/2021), or if symptoms worsen or fail to improve, for diabetes, depression, HTN. Josee Rodas MD

## 2021-04-09 ENCOUNTER — TELEPHONE (OUTPATIENT)
Dept: FAMILY MEDICINE CLINIC | Age: 56
End: 2021-04-09

## 2021-04-09 NOTE — TELEPHONE ENCOUNTER
She is okay to get the second vaccine in [place].   She would just need to make sure that she gets the same vaccine and at an appropriate follow-up interval.

## 2021-04-09 NOTE — TELEPHONE ENCOUNTER
----- Message from Caldwell Bence sent at 4/8/2021  1:58 PM EDT -----  Subject: Message to Provider    QUESTIONS  Information for Provider? pt wants to know if she can still get her second   dose of vaccine in [place] even though she had her first dose in [place] or   if she will have to start over when she gets it in [place].  ---------------------------------------------------------------------------  --------------  [employer]  What is the best way for the office to contact you? OK to leave message on   voicemail  Preferred Call Back Phone Number? [phone]  ---------------------------------------------------------------------------  --------------  SCRIPT ANSWERS  Relationship to Patient?  Self

## 2021-04-27 ENCOUNTER — OFFICE VISIT (OUTPATIENT)
Dept: FAMILY MEDICINE CLINIC | Age: 56
End: 2021-04-27
Payer: COMMERCIAL

## 2021-04-27 VITALS
OXYGEN SATURATION: 99 % | WEIGHT: 163 LBS | SYSTOLIC BLOOD PRESSURE: 158 MMHG | HEIGHT: 62 IN | HEART RATE: 75 BPM | BODY MASS INDEX: 30 KG/M2 | TEMPERATURE: 97.6 F | DIASTOLIC BLOOD PRESSURE: 70 MMHG

## 2021-04-27 DIAGNOSIS — E78.5 HYPERLIPIDEMIA, UNSPECIFIED HYPERLIPIDEMIA TYPE: ICD-10-CM

## 2021-04-27 DIAGNOSIS — E10.69 TYPE 1 DIABETES MELLITUS WITH OTHER SPECIFIED COMPLICATION (HCC): Primary | ICD-10-CM

## 2021-04-27 DIAGNOSIS — I25.10 CORONARY ARTERY DISEASE INVOLVING NATIVE CORONARY ARTERY OF NATIVE HEART WITHOUT ANGINA PECTORIS: ICD-10-CM

## 2021-04-27 DIAGNOSIS — I10 ESSENTIAL HYPERTENSION: ICD-10-CM

## 2021-04-27 DIAGNOSIS — F32.A DEPRESSION, UNSPECIFIED DEPRESSION TYPE: ICD-10-CM

## 2021-04-27 DIAGNOSIS — J45.20 MILD INTERMITTENT ASTHMA WITHOUT COMPLICATION: ICD-10-CM

## 2021-04-27 LAB
BILIRUBIN, POC: NORMAL
BLOOD URINE, POC: NORMAL
CHP ED QC CHECK: NORMAL
CLARITY, POC: CLEAR
COLOR, POC: YELLOW
CREATININE URINE POCT: 300
GLUCOSE BLD-MCNC: 137 MG/DL
GLUCOSE URINE, POC: 500
KETONES, POC: 15
LEUKOCYTE EST, POC: NORMAL
MICROALBUMIN/CREAT 24H UR: 150 MG/G{CREAT}
MICROALBUMIN/CREAT UR-RTO: 30
NITRITE, POC: NORMAL
PH, POC: 6
PROTEIN, POC: 300
SPECIFIC GRAVITY, POC: 1.03
UROBILINOGEN, POC: 0.2

## 2021-04-27 PROCEDURE — 82962 GLUCOSE BLOOD TEST: CPT | Performed by: FAMILY MEDICINE

## 2021-04-27 PROCEDURE — 81002 URINALYSIS NONAUTO W/O SCOPE: CPT | Performed by: FAMILY MEDICINE

## 2021-04-27 PROCEDURE — 82044 UR ALBUMIN SEMIQUANTITATIVE: CPT | Performed by: FAMILY MEDICINE

## 2021-04-27 PROCEDURE — 3046F HEMOGLOBIN A1C LEVEL >9.0%: CPT | Performed by: FAMILY MEDICINE

## 2021-04-27 PROCEDURE — 99214 OFFICE O/P EST MOD 30 MIN: CPT | Performed by: FAMILY MEDICINE

## 2021-04-27 PROCEDURE — 3017F COLORECTAL CA SCREEN DOC REV: CPT | Performed by: FAMILY MEDICINE

## 2021-04-27 PROCEDURE — 2022F DILAT RTA XM EVC RTNOPTHY: CPT | Performed by: FAMILY MEDICINE

## 2021-04-27 PROCEDURE — G8427 DOCREV CUR MEDS BY ELIG CLIN: HCPCS | Performed by: FAMILY MEDICINE

## 2021-04-27 PROCEDURE — G8417 CALC BMI ABV UP PARAM F/U: HCPCS | Performed by: FAMILY MEDICINE

## 2021-04-27 PROCEDURE — 1036F TOBACCO NON-USER: CPT | Performed by: FAMILY MEDICINE

## 2021-04-27 RX ORDER — LISINOPRIL 5 MG/1
5 TABLET ORAL DAILY
Qty: 30 TABLET | Refills: 0 | Status: SHIPPED | OUTPATIENT
Start: 2021-04-27 | End: 2021-06-14

## 2021-04-27 RX ORDER — INSULIN GLARGINE 100 [IU]/ML
24 INJECTION, SOLUTION SUBCUTANEOUS NIGHTLY
Qty: 2 PEN | Refills: 2 | Status: SHIPPED | OUTPATIENT
Start: 2021-04-27 | End: 2021-08-03 | Stop reason: SDUPTHER

## 2021-04-27 RX ORDER — BLOOD-GLUCOSE METER
KIT MISCELLANEOUS
Qty: 100 EACH | Refills: 1 | Status: SHIPPED | OUTPATIENT
Start: 2021-04-27 | End: 2021-08-03 | Stop reason: SDUPTHER

## 2021-04-27 RX ORDER — ROSUVASTATIN CALCIUM 40 MG/1
40 TABLET, COATED ORAL EVERY EVENING
Qty: 30 TABLET | Refills: 2 | Status: SHIPPED | OUTPATIENT
Start: 2021-04-27 | End: 2021-08-03 | Stop reason: SDUPTHER

## 2021-04-27 RX ORDER — PEN NEEDLE, DIABETIC 30 GX3/16"
NEEDLE, DISPOSABLE MISCELLANEOUS
Qty: 100 EACH | Refills: 1 | Status: CANCELLED | OUTPATIENT
Start: 2021-04-27

## 2021-04-27 RX ORDER — FLUOXETINE HYDROCHLORIDE 40 MG/1
40 CAPSULE ORAL DAILY
Qty: 30 CAPSULE | Refills: 2 | Status: SHIPPED | OUTPATIENT
Start: 2021-04-27 | End: 2021-08-03 | Stop reason: SDUPTHER

## 2021-04-27 ASSESSMENT — ENCOUNTER SYMPTOMS
CONSTIPATION: 0
DIARRHEA: 0
CHEST TIGHTNESS: 0
RHINORRHEA: 0
BLOOD IN STOOL: 0
ABDOMINAL PAIN: 0
COUGH: 0
WHEEZING: 0
SHORTNESS OF BREATH: 0

## 2021-04-27 ASSESSMENT — PATIENT HEALTH QUESTIONNAIRE - PHQ9
SUM OF ALL RESPONSES TO PHQ9 QUESTIONS 1 & 2: 0
2. FEELING DOWN, DEPRESSED OR HOPELESS: 0
SUM OF ALL RESPONSES TO PHQ QUESTIONS 1-9: 0
SUM OF ALL RESPONSES TO PHQ QUESTIONS 1-9: 0

## 2021-04-27 NOTE — PROGRESS NOTES
SUBJECTIVE:  Valarie Kitchen   1965   female   No Known Allergies    Chief Complaint   Patient presents with    3 Month Follow-Up    Diabetes    Depression    Hypertension        Patient Active Problem List    Diagnosis Date Noted    Troponin level elevated 02/04/2020     Last Assessment & Plan:   Slight lateral depression, Q waves inferiorly but narrow, poor R wave progression anteriorly on EKG. Troponin 0.100 1st   will cycle Troponins      Trigger finger, right ring finger 07/23/2019    Diabetes mellitus type 2 in obese ([facility] [address] 01/31/2017    Angina effort 11/09/2015     Replacing deprecated diagnoses      CAD S/P percutaneous coronary angioplasty 11/09/2015    Coronary artery disease involving native coronary artery of native heart without angina pectoris 10/29/2015     Overview:   ang mLAD + pLCX st'd diffuse D + dLCX dz  '03  ang dRCAst'd LCA same '05  dRCA 35%ISR LCA same EF 60  5/06  MPI nml EF58  8/07      Mild intermittent asthma without complication 14/42/3146    Type 1 diabetes mellitus ([facility] [address]      IDDM      Diabetic retinopathy ([facility] [address]     Asthma     Irritable bowel syndrome     Dysthymia 04/21/2014    HTN (hypertension) 04/21/2014    IDDM (insulin dependent diabetes mellitus) 04/21/2014    CAD (coronary artery disease) 04/21/2014    Dyslipidemia 10/28/2012       HPI   Patient is here today for follow-up on diabetes, depression, hypertension, CAD, hyperlipidemia and mild intermittent asthma. She has not had any trouble with her asthma for a long time. She has only been using albuterol as needed and has not had to use it frequently. No recent ED or hospital visits for wheezing or shortness of breath. She is followed by cardiology for CAD and has been stable on current management. Denies chest pain, shortness of breath, or myalgias. Denies headache, change in vision or any neurologic symptoms. Patient denies symptoms of depression or anxiety. Has been sleeping well.   Blood pressure has been mildly elevated for the last few visits. After reviewing medications and suggesting lisinopril patient reports that she thinks she is not was on that in the past but she had ran out and actually stopped taking it probably after the last batch was done may be several months ago. No other complaints today. She has not had labs done for a while.   Past Medical History:   Diagnosis Date    Asthma     CAD (coronary artery disease)     Depression     Diabetes (HCC)     IDDM    Diabetic retinopathy ([facility] [address]     Hyperlipidemia     Hypertension     Irritable bowel syndrome      Social History     Socioeconomic History    Marital status: Single     Spouse name: Not on file    Number of children: Not on file    Years of education: Not on file    Highest education level: Not on file   Occupational History    Not on file   Social Needs    Financial resource strain: Not on file    Food insecurity     Worry: Not on file     Inability: Not on file    Transportation needs     Medical: Not on file     Non-medical: Not on file   Tobacco Use    Smoking status: Former Smoker     Packs/day: 1.00     Years: 22.00     Pack years: 22.00     Types: Cigarettes     Start date: 1980     Quit date: 2002     Years since quittin.0    Smokeless tobacco: Never Used   Substance and Sexual Activity    Alcohol use: No     Alcohol/week: 0.0 standard drinks    Drug use: No    Sexual activity: Not on file   Lifestyle    Physical activity     Days per week: Not on file     Minutes per session: Not on file    Stress: Not on file   Relationships    Social connections     Talks on phone: Not on file     Gets together: Not on file     Attends Hoahaoism service: Not on file     Active member of club or organization: Not on file     Attends meetings of clubs or organizations: Not on file     Relationship status: Not on file    Intimate partner violence     Fear of current or ex partner: Not on file Emotionally abused: Not on file     Physically abused: Not on file     Forced sexual activity: Not on file   Other Topics Concern    Not on file   Social History Narrative    Lives by herself     Family History   Problem Relation Age of Onset    Depression Mother     Heart Disease Mother     Depression Maternal Grandmother     Heart Disease Maternal Grandmother     Heart Disease Maternal Grandfather     Heart Disease Paternal Aunt        Review of Systems   Constitutional: Negative for activity change, appetite change and unexpected weight change. HENT: Negative for congestion, postnasal drip and rhinorrhea. Respiratory: Negative for cough, chest tightness, shortness of breath and wheezing. Cardiovascular: Negative for chest pain, palpitations and leg swelling. Gastrointestinal: Negative for abdominal pain, blood in stool, constipation and diarrhea. Musculoskeletal: Negative for arthralgias and myalgias. Skin: Negative for rash. Neurological: Negative for light-headedness and headaches. Hematological: Negative for adenopathy. Does not bruise/bleed easily. Psychiatric/Behavioral: Negative for dysphoric mood, sleep disturbance and suicidal ideas. The patient is not nervous/anxious. OBJECTIVE:  BP (!) 158/70   Pulse 75   Temp 97.6 °F (36.4 °C)   Ht 5' 2\" (1.575 m)   Wt 163 lb (73.9 kg)   LMP 08/26/2016   SpO2 99%   BMI 29.81 kg/m²   Physical Exam  Vitals signs and nursing note reviewed. Constitutional:       Appearance: She is well-developed. Eyes:      Pupils: Pupils are equal, round, and reactive to light. Neck:      Musculoskeletal: Normal range of motion and neck supple. Thyroid: No thyromegaly. Vascular: No JVD. Cardiovascular:      Rate and Rhythm: Normal rate and regular rhythm. Pulmonary:      Effort: Pulmonary effort is normal.      Breath sounds: Normal breath sounds.    Abdominal:      General: Bowel sounds are normal.      Palpations: Abdomen is soft.      Tenderness: There is no abdominal tenderness. Skin:     Findings: No rash. Neurological:      Mental Status: She is alert and oriented to person, place, and time. Cranial Nerves: No cranial nerve deficit. Psychiatric:         Behavior: Behavior normal.         Thought Content: Thought content normal.         ASSESSMENT/PLAN:    1. Type 1 diabetes mellitus with other specified complication [facility])  We will continue with current management. Refill medications  Ambulatory blood sugar checks advised  Patient to come back for labs soon  - POCT Urinalysis no Micro  - POCT microalbumin  - POCT Glucose  - Hemoglobin A1C; Future    2. Depression, unspecified depression type    Stress management  Continue with Prozac  - FLUoxetine (PROZAC) 40 MG capsule; Take 1 capsule by mouth daily  Dispense: 30 capsule; Refill: 2    3. Essential hypertension  Continue with current management. We will add lisinopril back in at 5 mg daily  Ambulatory blood pressure checks    4. Coronary artery disease involving native coronary artery of native heart without angina pectoris  Follow-up with cardiology as advised  Reviewed last follow-up and recommendations    5. Hyperlipidemia, unspecified hyperlipidemia type  Continue with Crestor  Patient to return shortly for labs  Appropriate diet management  - Comprehensive Metabolic Panel; Future  - CBC Auto Differential; Future  - CK; Future  - Lipid Panel; Future    6. Mild intermittent asthma without complication  Continue with as needed treatment      Patient was advised once again to go to GYN for a Pap smear. Referrals have been given in the past.  She was also advised to follow-up for an updated mammogram for breast cancer screening. She is aware and will try to make those appointments.   Orders Placed This Encounter   Procedures    Comprehensive Metabolic Panel     Standing Status:   Future     Standing Expiration Date:   5/17/2022    CBC Auto Differential     Standing Status:   Future     Standing Expiration Date:   5/17/2022    CK     Standing Status:   Future     Standing Expiration Date:   5/17/2022    Hemoglobin A1C     Standing Status:   Future     Standing Expiration Date:   5/17/2022    Lipid Panel     Standing Status:   Future     Standing Expiration Date:   5/17/2022     Order Specific Question:   Is Patient Fasting?/# of Hours     Answer:   10 hrs    POCT Urinalysis no Micro    POCT microalbumin    POCT Glucose     Current Outpatient Medications   Medication Sig Dispense Refill    insulin lispro (HUMALOG) 100 UNIT/ML injection vial Inject 5 Units into the skin 3 times daily (before meals) 10 mL 0    blood glucose test strips (FREESTYLE LITE) strip USE 1 STRIP TO CHECK GLUCOSE 4 TIMES DAILY AS NEEDED 100 each 1    FLUoxetine (PROZAC) 40 MG capsule Take 1 capsule by mouth daily 30 capsule 2    insulin glargine (LANTUS SOLOSTAR) 100 UNIT/ML injection pen Inject 24 Units into the skin nightly 2 pen 2    Insulin Pen Needle 31G X 5 MM MISC 1 each by Does not apply route daily 100 each 1    Insulin Syringe-Needle U-100 30G X 5/16\" 1 ML MISC 1 each by Does not apply route daily 100 each 2    rosuvastatin (CRESTOR) 40 MG tablet Take 1 tablet by mouth every evening 30 tablet 2    lisinopril (PRINIVIL;ZESTRIL) 5 MG tablet Take 1 tablet by mouth daily 30 tablet 0    Insulin Pen Needle (PEN NEEDLES) 32G X 4 MM MISC USE ONE NEEDLE DAILY WITH BASAGLAR 100 each 1    insulin aspart protamine-insulin aspart (NOVOLOG MIX 70/30) (70-30) 100 UNIT/ML injection use as directed      benzonatate (TESSALON) 200 MG capsule TK ONE C PO  TID PRF COUGH FOR UP TO 14 DAYS      azithromycin (ZITHROMAX) 250 MG tablet ZPK      blood glucose test strips (FREESTYLE LITE) strip USE ONE STRIP TO TEST FOUR TIMES A DAY AND AS NEEDED 100 strip 1    Insulin Pen Needle (PEN NEEDLES) 32G X 4 MM MISC Inject 1 each into the skin 4 times daily 90 each 0    fluticasone (FLONASE) 50 MCG/ACT nasal spray 1 spray by Each Nostril route daily (Patient not taking: Reported on 6/15/2020) 1 Bottle 0    insulin glargine (LANTUS SOLOSTAR) 100 UNIT/ML injection pen Inject 25 Units into the skin nightly 5 pen 2    insulin glargine (LANTUS SOLOSTAR) 100 UNIT/ML injection pen Inject 24 Units into the skin nightly 10 pen 0    COMFORT ASSIST INSULIN SYRINGE 31G X 5/16\" 1 ML MISC Inject 1 each into the skin 2 times daily 100 each 2    FREESTYLE LANCETS MISC Inject 1 each into the skin 2 times daily 100 each 2    Insulin Pen Needle (PEN NEEDLES) 32G X 4 MM MISC ONE NEEDLE - DAILY WITH BASAGLAR 100 each 0    albuterol sulfate HFA (PROAIR HFA) 108 (90 Base) MCG/ACT inhaler Inhale 2 puffs into the lungs every 6 hours as needed for Wheezing 1 Inhaler 0    naproxen sodium (ANAPROX DS) 550 MG tablet Take 1 tablet by mouth 2 times daily (with meals) (Patient not taking: Reported on 6/15/2020) 30 tablet 0    Insulin Syringe-Needle U-100 (B-D INS SYR ULTRAFINE 1CC/31G) 31G X 5/16\" 1 ML MISC Inject 1 each into the skin 3 times daily 150 each 1    blood glucose test strips (ASCENSIA AUTODISC VI;ONE TOUCH ULTRA TEST VI) strip 1 each by In Vitro route 6 times daily The patient tests six times daily.   She uses the freestyle strips 180 each 2    Insulin Pen Needle (PEN NEEDLES) 32G X 4 MM MISC USE ONE NEW NEEDLE DAILY WITH BASAGLAR 100 each 0    Blood Glucose Monitoring Suppl (FREESTYLE LITE) QUAN Inject 1 Device into the skin 4 times daily 1 Device 0    baclofen (LIORESAL) 10 MG tablet TK 1 T PO TID PRF MUSCLE SPASMS  0    polyethylene glycol (GLYCOLAX) powder MIX 17 GRAMS IN LIQUID OF CHOICE AND TK ONCE D PRF CONSTIPATION  1    nitroGLYCERIN (NITROSTAT) 0.3 MG SL tablet       atorvastatin (LIPITOR) 80 MG tablet       metoprolol (TOPROL-XL) 50 MG XL tablet Take 50 mg by mouth      amLODIPine (NORVASC) 5 MG tablet Take 5 mg by mouth      Blood Glucose Monitoring Suppl (ONE TOUCH ULTRA MINI) W/DEVICE KIT 1 kit by Does not apply route daily as needed 1 kit 0    Tuberculin-Allergy Syringes (B-D PLASTIPAK SYRINGES ALLERGY) 28G X 1/2\" 1 ML MISC 1 each by Does not apply route daily as needed 50 each 1    Insulin NPH Isophane & Regular (HUMULIN 70/30 PEN) (70-30) 100 UNIT/ML injection pen Inject 25 Units into the skin      Glucose Blood (GLUCOMETER ELITE TEST VI) by In Vitro route. The patient needs the One Touch Ultra Glucometer. Would not come up in epic.  aspirin 81 MG tablet Take 81 mg by mouth daily.  clopidogrel (PLAVIX) 75 MG tablet Take 75 mg by mouth daily. No current facility-administered medications for this visit. Return in about 4 years (around 4/27/2025), or if symptoms worsen or fail to improve, for diabetes, HTN, .     Reginald Bob MD

## 2021-06-14 RX ORDER — LISINOPRIL 5 MG/1
TABLET ORAL
Qty: 30 TABLET | Refills: 0 | Status: SHIPPED | OUTPATIENT
Start: 2021-06-14 | End: 2021-08-03 | Stop reason: SDUPTHER

## 2021-06-23 NOTE — TELEPHONE ENCOUNTER
----- Message from Zamzam Demarco sent at 6/23/2021  3:19 PM EDT -----  Subject: Refill Request    QUESTIONS  Name of Medication? insulin lispro (HUMALOG) 100 UNIT/ML injection vial  Patient-reported dosage and instructions? sliding scale   How many days do you have left? 0  Preferred Pharmacy? [address]  Pharmacy phone number (if available)? [phone]  Additional Information for Provider? Got her scheduled for a follow-up.   ---------------------------------------------------------------------------  --------------  CALL BACK INFO  What is the best way for the office to contact you? OK to leave message on   voicemail  Preferred Call Back Phone Number?  [phone]

## 2021-07-09 ENCOUNTER — TELEPHONE (OUTPATIENT)
Dept: FAMILY MEDICINE CLINIC | Age: 56
End: 2021-07-09

## 2021-07-09 NOTE — TELEPHONE ENCOUNTER
----- Message from Blade Yaw sent at 7/9/2021  1:24 PM EDT -----  Subject: Appointment Request    Reason for Call: Routine Existing Condition Follow Up (Diabetes)    QUESTIONS  Type of Appointment? Established Patient  Reason for appointment request? No appointments available during search  Additional Information for Provider? Patient missed last appointment due   to family emergency. Patient is requesting to reschedule.  ---------------------------------------------------------------------------  --------------  CALL BACK INFO  What is the best way for the office to contact you? OK to leave message on   voicemail  Preferred Call Back Phone Number? [phone]  ---------------------------------------------------------------------------  --------------  SCRIPT ANSWERS  Relationship to Patient? Self  Appointment reason? Well Care/Follow Ups  Select a Well Care/Follow Ups appointment reason? Adult Existing Condition   Follow Up [Diabetes, CHF, COPD, Hypertension/Blood Pressure Check]  (Is the patient requesting to be seen urgently for their symptoms?)? No  Is this follow up request related to routine Diabetes Management? Yes  Are you having any new concerns about your existing condition? No  Have you been diagnosed with, awaiting test results for, or told that you   are suspected of having COVID-19 (Coronavirus)? (If patient has tested   negative or was tested as a requirement for work, school, or travel and   not based on symptoms, answer no)? No  Do you currently have flu-like symptoms including fever or chills, cough,   shortness of breath, difficulty breathing, or new loss of taste or smell? No  Have you had close contact with someone with COVID-19 in the last 14 days? No  (Service Expert  click yes below to proceed with [employer] As Usual   Scheduling)?  Yes

## 2021-07-14 ENCOUNTER — TELEPHONE (OUTPATIENT)
Dept: FAMILY MEDICINE CLINIC | Age: 56
End: 2021-07-14

## 2021-07-26 RX ORDER — INSULIN GLARGINE 100 [IU]/ML
INJECTION, SOLUTION SUBCUTANEOUS
Qty: 15 ML | Refills: 0 | OUTPATIENT
Start: 2021-07-26

## 2021-07-29 RX ORDER — LISINOPRIL 5 MG/1
TABLET ORAL
Qty: 30 TABLET | Refills: 0 | OUTPATIENT
Start: 2021-07-29

## 2021-07-30 NOTE — TELEPHONE ENCOUNTER
Patient calling stating she is out of her generic lantus. She has about a 1/4 bottle of generic Humalog. She wants to schedule an appointment. Patient discharged for no show. Patient states she is in [facility] 6 mos of the year and would have not scheduled these appointments. She said she may have missed a couple but not more than 2. Practice manager will discuss with Dr. Venkat John.       Pharmacy- [address]

## 2021-07-30 NOTE — TELEPHONE ENCOUNTER
Please set up her Levemir prescription for now.   I will discuss this with Fede Mckeon and gave her a call back

## 2021-08-03 ENCOUNTER — OFFICE VISIT (OUTPATIENT)
Dept: FAMILY MEDICINE CLINIC | Age: 56
End: 2021-08-03
Payer: COMMERCIAL

## 2021-08-03 VITALS
HEART RATE: 87 BPM | BODY MASS INDEX: 29.81 KG/M2 | SYSTOLIC BLOOD PRESSURE: 139 MMHG | TEMPERATURE: 97 F | OXYGEN SATURATION: 98 % | DIASTOLIC BLOOD PRESSURE: 70 MMHG | WEIGHT: 163 LBS

## 2021-08-03 DIAGNOSIS — I25.10 CORONARY ARTERY DISEASE INVOLVING NATIVE CORONARY ARTERY OF NATIVE HEART WITHOUT ANGINA PECTORIS: ICD-10-CM

## 2021-08-03 DIAGNOSIS — J45.20 MILD INTERMITTENT ASTHMA WITHOUT COMPLICATION: ICD-10-CM

## 2021-08-03 DIAGNOSIS — E10.69 TYPE 1 DIABETES MELLITUS WITH OTHER SPECIFIED COMPLICATION (HCC): ICD-10-CM

## 2021-08-03 DIAGNOSIS — E78.5 HYPERLIPIDEMIA, UNSPECIFIED HYPERLIPIDEMIA TYPE: ICD-10-CM

## 2021-08-03 DIAGNOSIS — I10 ESSENTIAL HYPERTENSION: ICD-10-CM

## 2021-08-03 DIAGNOSIS — E10.69 TYPE 1 DIABETES MELLITUS WITH OTHER SPECIFIED COMPLICATION (HCC): Primary | ICD-10-CM

## 2021-08-03 DIAGNOSIS — Z00.00 WELL WOMAN EXAM (NO GYNECOLOGICAL EXAM): ICD-10-CM

## 2021-08-03 DIAGNOSIS — F32.A DEPRESSION, UNSPECIFIED DEPRESSION TYPE: ICD-10-CM

## 2021-08-03 DIAGNOSIS — R41.3 MEMORY PROBLEM: ICD-10-CM

## 2021-08-03 LAB
BASOPHILS ABSOLUTE: 0.1 K/UL (ref 0–0.2)
BASOPHILS RELATIVE PERCENT: 1 %
BILIRUBIN, POC: ABNORMAL
BLOOD URINE, POC: ABNORMAL
CHOLESTEROL, TOTAL: 187 MG/DL (ref 0–199)
CHP ED QC CHECK: ABNORMAL
CLARITY, POC: ABNORMAL
COLOR, POC: ABNORMAL
EOSINOPHILS ABSOLUTE: 0.1 K/UL (ref 0–0.6)
EOSINOPHILS RELATIVE PERCENT: 2.2 %
GLUCOSE BLD-MCNC: 409 MG/DL
GLUCOSE URINE, POC: >=1000
HCT VFR BLD CALC: 36.6 % (ref 36–48)
HDLC SERPL-MCNC: 62 MG/DL (ref 40–60)
HEMOGLOBIN: 12.1 G/DL (ref 12–16)
KETONES, POC: 15
LDL CHOLESTEROL CALCULATED: 113 MG/DL
LEUKOCYTE EST, POC: ABNORMAL
LYMPHOCYTES ABSOLUTE: 1.2 K/UL (ref 1–5.1)
LYMPHOCYTES RELATIVE PERCENT: 18.7 %
MCH RBC QN AUTO: 30.2 PG (ref 26–34)
MCHC RBC AUTO-ENTMCNC: 32.9 G/DL (ref 31–36)
MCV RBC AUTO: 91.8 FL (ref 80–100)
MONOCYTES ABSOLUTE: 0.4 K/UL (ref 0–1.3)
MONOCYTES RELATIVE PERCENT: 6.5 %
NEUTROPHILS ABSOLUTE: 4.5 K/UL (ref 1.7–7.7)
NEUTROPHILS RELATIVE PERCENT: 71.6 %
NITRITE, POC: ABNORMAL
PDW BLD-RTO: 12.8 % (ref 12.4–15.4)
PH, POC: 5
PLATELET # BLD: 321 K/UL (ref 135–450)
PMV BLD AUTO: 8.1 FL (ref 5–10.5)
PROTEIN, POC: ABNORMAL
RBC # BLD: 3.99 M/UL (ref 4–5.2)
SPECIFIC GRAVITY, POC: 1.01
TOTAL CK: 35 U/L (ref 26–192)
TRIGL SERPL-MCNC: 62 MG/DL (ref 0–150)
UROBILINOGEN, POC: 0.2
VLDLC SERPL CALC-MCNC: 12 MG/DL
WBC # BLD: 6.3 K/UL (ref 4–11)

## 2021-08-03 PROCEDURE — 3046F HEMOGLOBIN A1C LEVEL >9.0%: CPT | Performed by: FAMILY MEDICINE

## 2021-08-03 PROCEDURE — 99215 OFFICE O/P EST HI 40 MIN: CPT | Performed by: FAMILY MEDICINE

## 2021-08-03 PROCEDURE — 1036F TOBACCO NON-USER: CPT | Performed by: FAMILY MEDICINE

## 2021-08-03 PROCEDURE — 3017F COLORECTAL CA SCREEN DOC REV: CPT | Performed by: FAMILY MEDICINE

## 2021-08-03 PROCEDURE — 2022F DILAT RTA XM EVC RTNOPTHY: CPT | Performed by: FAMILY MEDICINE

## 2021-08-03 PROCEDURE — 81002 URINALYSIS NONAUTO W/O SCOPE: CPT | Performed by: FAMILY MEDICINE

## 2021-08-03 PROCEDURE — 82962 GLUCOSE BLOOD TEST: CPT | Performed by: FAMILY MEDICINE

## 2021-08-03 PROCEDURE — G8417 CALC BMI ABV UP PARAM F/U: HCPCS | Performed by: FAMILY MEDICINE

## 2021-08-03 PROCEDURE — G8427 DOCREV CUR MEDS BY ELIG CLIN: HCPCS | Performed by: FAMILY MEDICINE

## 2021-08-03 RX ORDER — FLUOXETINE HYDROCHLORIDE 40 MG/1
40 CAPSULE ORAL DAILY
Qty: 30 CAPSULE | Refills: 2 | Status: SHIPPED | OUTPATIENT
Start: 2021-08-03 | End: 2021-10-01

## 2021-08-03 RX ORDER — BLOOD-GLUCOSE METER
KIT MISCELLANEOUS
Qty: 100 EACH | Refills: 1 | Status: SHIPPED | OUTPATIENT
Start: 2021-08-03 | End: 2021-11-16 | Stop reason: ALTCHOICE

## 2021-08-03 RX ORDER — INSULIN GLARGINE 100 [IU]/ML
24 INJECTION, SOLUTION SUBCUTANEOUS NIGHTLY
Qty: 2 PEN | Refills: 2 | Status: SHIPPED | OUTPATIENT
Start: 2021-08-03 | End: 2021-11-16 | Stop reason: ALTCHOICE

## 2021-08-03 RX ORDER — ROSUVASTATIN CALCIUM 40 MG/1
40 TABLET, COATED ORAL EVERY EVENING
Qty: 30 TABLET | Refills: 2 | Status: SHIPPED | OUTPATIENT
Start: 2021-08-03 | End: 2022-03-31 | Stop reason: SDUPTHER

## 2021-08-03 RX ORDER — LISINOPRIL 5 MG/1
TABLET ORAL
Qty: 90 TABLET | Refills: 0 | Status: SHIPPED | OUTPATIENT
Start: 2021-08-03

## 2021-08-03 ASSESSMENT — ENCOUNTER SYMPTOMS
SHORTNESS OF BREATH: 0
COUGH: 0
CHEST TIGHTNESS: 0
DIARRHEA: 0
ABDOMINAL PAIN: 0
CONSTIPATION: 0
BLOOD IN STOOL: 0

## 2021-08-03 NOTE — PROGRESS NOTES
SUBJECTIVE:  Dominic Yadav   1965   female   No Known Allergies    Chief Complaint   Patient presents with    Diabetes    Hypertension        Patient Active Problem List    Diagnosis Date Noted    Troponin level elevated 02/04/2020     Last Assessment & Plan:   Slight lateral depression, Q waves inferiorly but narrow, poor R wave progression anteriorly on EKG. Troponin 0.100 1st   will cycle Troponins      Trigger finger, right ring finger 07/23/2019    Diabetes mellitus type 2 in obese ([facility] [address] 01/31/2017    Angina effort 11/09/2015     Replacing deprecated diagnoses      CAD S/P percutaneous coronary angioplasty 11/09/2015    Coronary artery disease involving native coronary artery of native heart without angina pectoris 10/29/2015     Overview:   ang mLAD + pLCX st'd diffuse D + dLCX dz  '03  ang dRCAst'd LCA same '05  dRCA 35%ISR LCA same EF 60  5/06  MPI nml EF58  8/07      Mild intermittent asthma without complication 32/31/6681    Type 1 diabetes mellitus ([facility] [address]      IDDM      Diabetic retinopathy ([facility] [address]     Asthma     Irritable bowel syndrome     Dysthymia 04/21/2014    HTN (hypertension) 04/21/2014    IDDM (insulin dependent diabetes mellitus) 04/21/2014    CAD (coronary artery disease) 04/21/2014    Dyslipidemia 10/28/2012       HPI   Patient is here today for follow-up on diabetes, depression/anxiety, CAD, hyperlipidemia, mild intermittent asthma, hypertension, complaining of some memory issues. Patient reports for the last 3 years since she stopped working she is struggling to remember things like appointments and other short-term memory issues. She has had several concussions in the last few years  She does haa history of dementia in her parents but not until they were in their [de-identified]. Patient is somewhat compliant with her medical treatment. She does try to take her medications on daily basis.   Reports she has very few episodes of hypoglycemia and none since she has been evaluated last.  Has a blood work done in little over a year. Last hemoglobin A1c was 7.1. Last cholesterol labs were okay 152 total cholesterol and LDL of 80. Patient is currently followed by cardiology for CAD and has been stable on current management. Patient denies any chest pain, shortness of breath or orthopnea. Denies headache change in vision or neurologic symptoms. She is not had a GYN exam in the years she is also not had a mammogram recently. He does report having her COVID-19 vaccines in [place] screening. She has had no recent problems with allergies or asthma. No recent ED or urgent care evaluation for wheezing or cough or shortness of breath. She is not using.   Past Medical History:   Diagnosis Date    Asthma     CAD (coronary artery disease)     Depression     Diabetes ([facility])     IDDM    Diabetic retinopathy ([facility] [address]     Hyperlipidemia     Hypertension     Irritable bowel syndrome      Social History     Socioeconomic History    Marital status: Single     Spouse name: Not on file    Number of children: Not on file    Years of education: Not on file    Highest education level: Not on file   Occupational History    Not on file   Tobacco Use    Smoking status: Former Smoker     Packs/day: 1.00     Years: 22.00     Pack years: 22.00     Types: Cigarettes     Start date: 1980     Quit date: 2002     Years since quittin.2    Smokeless tobacco: Never Used   Vaping Use    Vaping Use: Never used   Substance and Sexual Activity    Alcohol use: No     Alcohol/week: 0.0 standard drinks    Drug use: No    Sexual activity: Not on file   Other Topics Concern    Not on file   Social History Narrative    Lives by herself     Social Determinants of Health     Financial Resource Strain:     Difficulty of Paying Living Expenses:    Food Insecurity:     Worried About Running Out of Food in the Last Year:     [address] in the Last Year:    Transportation Needs:     Lack of Transportation (Medical):  Lack of Transportation (Non-Medical):    Physical Activity:     Days of Exercise per Week:     Minutes of Exercise per Session:    Stress:     Feeling of Stress :    Social Connections:     Frequency of Communication with Friends and Family:     Frequency of Social Gatherings with Friends and Family:     Attends [employer] Services:     Active Member of Clubs or Organizations:     Attends Club or Organization Meetings:     Marital Status:    Intimate Partner Violence:     Fear of Current or Ex-Partner:     Emotionally Abused:     Physically Abused:     Sexually Abused:      Family History   Problem Relation Age of Onset    Depression Mother     Heart Disease Mother     Depression Maternal Grandmother     Heart Disease Maternal Grandmother     Heart Disease Maternal Grandfather     Heart Disease Paternal Aunt        Review of Systems   Constitutional: Negative for activity change, appetite change and unexpected weight change. Respiratory: Negative for cough, chest tightness and shortness of breath. Cardiovascular: Negative for chest pain, palpitations and leg swelling. Gastrointestinal: Negative for abdominal pain, blood in stool, constipation and diarrhea. Musculoskeletal: Negative for arthralgias and myalgias. Skin: Negative for rash. Neurological: Negative for light-headedness and headaches. Hematological: Negative for adenopathy. Does not bruise/bleed easily. Psychiatric/Behavioral: Negative for dysphoric mood, sleep disturbance and suicidal ideas. The patient is not nervous/anxious. Trouble with memory       OBJECTIVE:  /70   Pulse 87   Temp 97 °F (36.1 °C)   Wt 163 lb (73.9 kg)   LMP 08/26/2016   SpO2 98%   BMI 29.81 kg/m²   Physical Exam  Vitals and nursing note reviewed. Constitutional:       Appearance: She is well-developed. Eyes:      Pupils: Pupils are equal, round, and reactive to light.    Neck:      Thyroid: No thyromegaly. Vascular: No JVD. Cardiovascular:      Rate and Rhythm: Normal rate and regular rhythm. Pulmonary:      Effort: Pulmonary effort is normal.      Breath sounds: Normal breath sounds. Abdominal:      General: Bowel sounds are normal.      Palpations: Abdomen is soft. Tenderness: There is no abdominal tenderness. Musculoskeletal:      Cervical back: Normal range of motion and neck supple. Skin:     Findings: No rash. Neurological:      Mental Status: She is alert and oriented to person, place, and time. Psychiatric:         Behavior: Behavior normal.         Thought Content: Thought content normal.       UAnormal  ASSESSMENT/PLAN:    1. Type 1 diabetes mellitus with other specified complication ([address]  Continue with current management refill meds  Ambulatory blood sugar checks  Discussed appropriate diet  Patient to keep close eye on blood sugars at home and adjust Humalog accordingly  Blood sugars are very elevated today. Patient was advised to continue checking them in using them on accordingly and call back if blood sugars do not go down  - POCT Glucose  - POCT Urinalysis no Micro    2. Depression, unspecified depression type  Management. Continue with fluoxetine  - FLUoxetine (PROZAC) 40 MG capsule; Take 1 capsule by mouth daily  Dispense: 30 capsule; Refill: 2    3. Memory problem  Will refer to neurology for evaluation  - Chrissy Owens MD, Neurology, [facility]    4. Well woman exam (no gynecological exam)  Refer to [address] [facility], MD, Gynecology, [facility]    5. Coronary artery disease involving native coronary artery of native heart without angina pectoris  Follow-up with cardiology    6. Hyperlipidemia, unspecified hyperlipidemia type  Reviewed last labs. We will continue with current management and refill Lipitor    7. Mild intermittent asthma without complication  Continue. Management    8. Essential hypertension  Refill meds.   Ambulatory blood pressure checks  DASH diet        Orders Placed This Encounter   Procedures   Manolo Montero MD, Neurology, [facility]     Referral Priority:   Routine     Referral Type:   Eval and Treat     Referral Reason:   Specialty Services Required     Referred to Provider:   Shara Main MD     Requested Specialty:   Neurology     Number of Visits Requested:   Marvin Diaz MD, Gynecology, [facility]     Referral Priority:   Routine     Referral Type:   Eval and Treat     Referral Reason:   Specialty Services Required     Referred to Provider:   Dena Yu MD     Requested Specialty:   Obstetrics & Gynecology     Number of Visits Requested:   1    POCT Glucose    POCT Urinalysis no Micro     Current Outpatient Medications   Medication Sig Dispense Refill    blood glucose test strips (FREESTYLE LITE) strip USE 1 STRIP TO CHECK GLUCOSE 4 TIMES DAILY AS NEEDED 100 each 1    FLUoxetine (PROZAC) 40 MG capsule Take 1 capsule by mouth daily 30 capsule 2    insulin glargine (LANTUS SOLOSTAR) 100 UNIT/ML injection pen Inject 24 Units into the skin nightly 2 pen 2    Insulin Pen Needle 31G X 5 MM MISC 1 each by Does not apply route daily 100 each 1    Insulin Syringe-Needle U-100 30G X 5/16\" 1 ML MISC 1 each by Does not apply route daily 100 each 2    rosuvastatin (CRESTOR) 40 MG tablet Take 1 tablet by mouth every evening 30 tablet 2    lisinopril (PRINIVIL;ZESTRIL) 5 MG tablet Take 1 tablet by mouth once daily 90 tablet 0    insulin lispro (HUMALOG) 100 UNIT/ML injection vial Inject 5 Units into the skin 3 times daily (before meals) 1 vial 0    Insulin Pen Needle (PEN NEEDLES) 32G X 4 MM MISC USE ONE NEEDLE DAILY WITH BASAGLAR 100 each 1    insulin aspart protamine-insulin aspart (NOVOLOG MIX 70/30) (70-30) 100 UNIT/ML injection use as directed      benzonatate (TESSALON) 200 MG capsule TK ONE C PO  TID PRF COUGH FOR UP TO 14 DAYS      azithromycin (ZITHROMAX) 250 MG tablet ZPK      blood glucose test strips (FREESTYLE LITE) strip USE ONE STRIP TO TEST FOUR TIMES A DAY AND AS NEEDED 100 strip 1    Insulin Pen Needle (PEN NEEDLES) 32G X 4 MM MISC Inject 1 each into the skin 4 times daily 90 each 0    fluticasone (FLONASE) 50 MCG/ACT nasal spray 1 spray by Each Nostril route daily (Patient not taking: Reported on 6/15/2020) 1 Bottle 0    insulin glargine (LANTUS SOLOSTAR) 100 UNIT/ML injection pen Inject 25 Units into the skin nightly 5 pen 2    insulin glargine (LANTUS SOLOSTAR) 100 UNIT/ML injection pen Inject 24 Units into the skin nightly 10 pen 0    COMFORT ASSIST INSULIN SYRINGE 31G X 5/16\" 1 ML MISC Inject 1 each into the skin 2 times daily 100 each 2    FREESTYLE LANCETS MISC Inject 1 each into the skin 2 times daily 100 each 2    Insulin Pen Needle (PEN NEEDLES) 32G X 4 MM MISC ONE NEEDLE - DAILY WITH BASAGLAR 100 each 0    albuterol sulfate HFA (PROAIR HFA) 108 (90 Base) MCG/ACT inhaler Inhale 2 puffs into the lungs every 6 hours as needed for Wheezing 1 Inhaler 0    naproxen sodium (ANAPROX DS) 550 MG tablet Take 1 tablet by mouth 2 times daily (with meals) (Patient not taking: Reported on 6/15/2020) 30 tablet 0    Insulin Syringe-Needle U-100 (B-D INS SYR ULTRAFINE 1CC/31G) 31G X 5/16\" 1 ML MISC Inject 1 each into the skin 3 times daily 150 each 1    blood glucose test strips (ASCENSIA AUTODISC VI;ONE TOUCH ULTRA TEST VI) strip 1 each by In Vitro route 6 times daily The patient tests six times daily.   She uses the freestyle strips 180 each 2    Insulin Pen Needle (PEN NEEDLES) 32G X 4 MM MISC USE ONE NEW NEEDLE DAILY WITH BASAGLAR 100 each 0    Blood Glucose Monitoring Suppl (FREESTYLE LITE) QUAN Inject 1 Device into the skin 4 times daily 1 Device 0    baclofen (LIORESAL) 10 MG tablet TK 1 T PO TID PRF MUSCLE SPASMS  0    polyethylene glycol (GLYCOLAX) powder MIX 17 GRAMS IN LIQUID OF CHOICE AND TK ONCE D PRF CONSTIPATION  1    nitroGLYCERIN (NITROSTAT) 0.3 MG SL tablet       atorvastatin (LIPITOR) 80 MG tablet       metoprolol (TOPROL-XL) 50 MG XL tablet Take 50 mg by mouth      amLODIPine (NORVASC) 5 MG tablet Take 5 mg by mouth      Blood Glucose Monitoring Suppl (ONE TOUCH ULTRA MINI) W/DEVICE KIT 1 kit by Does not apply route daily as needed 1 kit 0    Tuberculin-Allergy Syringes (B-D PLASTIPAK SYRINGES ALLERGY) 28G X 1/2\" 1 ML MISC 1 each by Does not apply route daily as needed 50 each 1    Insulin NPH Isophane & Regular (HUMULIN 70/30 PEN) (70-30) 100 UNIT/ML injection pen Inject 25 Units into the skin      Glucose Blood (GLUCOMETER ELITE TEST VI) by In Vitro route. The patient needs the One Touch Ultra Glucometer. Would not come up in epic.  aspirin 81 MG tablet Take 81 mg by mouth daily.  clopidogrel (PLAVIX) 75 MG tablet Take 75 mg by mouth daily. No current facility-administered medications for this visit. No follow-ups on file.     Danny Wolff MD, MD

## 2021-08-03 NOTE — TELEPHONE ENCOUNTER
Discussed patient with . We will schedule her in the office again now but she was advised if she has any further no-shows she will be dismissed .

## 2021-08-04 LAB
A/G RATIO: 1.4 (ref 1.1–2.2)
ALBUMIN SERPL-MCNC: 4 G/DL (ref 3.4–5)
ALP BLD-CCNC: 107 U/L (ref 40–129)
ALT SERPL-CCNC: 13 U/L (ref 10–40)
ANION GAP SERPL CALCULATED.3IONS-SCNC: 14 MMOL/L (ref 3–16)
AST SERPL-CCNC: 28 U/L (ref 15–37)
BILIRUB SERPL-MCNC: 0.4 MG/DL (ref 0–1)
BUN BLDV-MCNC: 13 MG/DL (ref 7–20)
CALCIUM SERPL-MCNC: 8.9 MG/DL (ref 8.3–10.6)
CHLORIDE BLD-SCNC: 97 MMOL/L (ref 99–110)
CO2: 23 MMOL/L (ref 21–32)
CREAT SERPL-MCNC: 0.8 MG/DL (ref 0.6–1.1)
ESTIMATED AVERAGE GLUCOSE: 269 MG/DL
GFR AFRICAN AMERICAN: >60
GFR NON-AFRICAN AMERICAN: >60
GLOBULIN: 2.9 G/DL
GLUCOSE BLD-MCNC: 354 MG/DL (ref 70–99)
HBA1C MFR BLD: 11 %
POTASSIUM SERPL-SCNC: 4.8 MMOL/L (ref 3.5–5.1)
SODIUM BLD-SCNC: 134 MMOL/L (ref 136–145)
TOTAL PROTEIN: 6.9 G/DL (ref 6.4–8.2)

## 2021-09-15 ENCOUNTER — OFFICE VISIT (OUTPATIENT)
Dept: FAMILY MEDICINE CLINIC | Age: 56
End: 2021-09-15
Payer: COMMERCIAL

## 2021-09-15 VITALS
HEART RATE: 66 BPM | SYSTOLIC BLOOD PRESSURE: 116 MMHG | RESPIRATION RATE: 18 BRPM | BODY MASS INDEX: 29.7 KG/M2 | WEIGHT: 161.4 LBS | OXYGEN SATURATION: 98 % | TEMPERATURE: 97 F | HEIGHT: 62 IN | DIASTOLIC BLOOD PRESSURE: 72 MMHG

## 2021-09-15 DIAGNOSIS — R42 VERTIGO: ICD-10-CM

## 2021-09-15 DIAGNOSIS — Z23 NEED FOR VACCINATION: ICD-10-CM

## 2021-09-15 DIAGNOSIS — I25.10 CORONARY ARTERY DISEASE INVOLVING NATIVE CORONARY ARTERY OF NATIVE HEART WITHOUT ANGINA PECTORIS: ICD-10-CM

## 2021-09-15 DIAGNOSIS — J45.20 MILD INTERMITTENT ASTHMA WITHOUT COMPLICATION: ICD-10-CM

## 2021-09-15 DIAGNOSIS — R41.3 FUNCTIONAL MEMORY PROBLEM: ICD-10-CM

## 2021-09-15 DIAGNOSIS — E78.5 HYPERLIPIDEMIA, UNSPECIFIED HYPERLIPIDEMIA TYPE: ICD-10-CM

## 2021-09-15 DIAGNOSIS — E10.69 TYPE 1 DIABETES MELLITUS WITH OTHER SPECIFIED COMPLICATION (HCC): Primary | ICD-10-CM

## 2021-09-15 LAB
CHP ED QC CHECK: NORMAL
GLUCOSE BLD-MCNC: 420 MG/DL

## 2021-09-15 PROCEDURE — 3046F HEMOGLOBIN A1C LEVEL >9.0%: CPT | Performed by: FAMILY MEDICINE

## 2021-09-15 PROCEDURE — 99215 OFFICE O/P EST HI 40 MIN: CPT | Performed by: FAMILY MEDICINE

## 2021-09-15 PROCEDURE — 3017F COLORECTAL CA SCREEN DOC REV: CPT | Performed by: FAMILY MEDICINE

## 2021-09-15 PROCEDURE — 93000 ELECTROCARDIOGRAM COMPLETE: CPT | Performed by: FAMILY MEDICINE

## 2021-09-15 PROCEDURE — 2022F DILAT RTA XM EVC RTNOPTHY: CPT | Performed by: FAMILY MEDICINE

## 2021-09-15 PROCEDURE — 82962 GLUCOSE BLOOD TEST: CPT | Performed by: FAMILY MEDICINE

## 2021-09-15 PROCEDURE — 1036F TOBACCO NON-USER: CPT | Performed by: FAMILY MEDICINE

## 2021-09-15 PROCEDURE — G8427 DOCREV CUR MEDS BY ELIG CLIN: HCPCS | Performed by: FAMILY MEDICINE

## 2021-09-15 PROCEDURE — G8417 CALC BMI ABV UP PARAM F/U: HCPCS | Performed by: FAMILY MEDICINE

## 2021-09-15 RX ORDER — ATORVASTATIN CALCIUM 80 MG/1
80 TABLET, FILM COATED ORAL DAILY
Qty: 30 TABLET | Refills: 1 | Status: SHIPPED | OUTPATIENT
Start: 2021-09-15 | End: 2021-11-16 | Stop reason: ALTCHOICE

## 2021-09-15 RX ORDER — MECLIZINE HYDROCHLORIDE 25 MG/1
25 TABLET ORAL 2 TIMES DAILY PRN
Qty: 20 TABLET | Refills: 0 | Status: SHIPPED | OUTPATIENT
Start: 2021-09-15 | End: 2021-09-25

## 2021-09-19 ASSESSMENT — ENCOUNTER SYMPTOMS
COUGH: 0
CHEST TIGHTNESS: 0
SHORTNESS OF BREATH: 0
BLOOD IN STOOL: 0
ABDOMINAL PAIN: 0
CONSTIPATION: 0
DIARRHEA: 0

## 2021-09-19 NOTE — PROGRESS NOTES
SUBJECTIVE:  Patricia Rodriguez   1965   female   No Known Allergies    Chief Complaint   Patient presents with    Dizziness        Patient Active Problem List    Diagnosis Date Noted    Troponin level elevated 02/04/2020     Last Assessment & Plan:   Slight lateral depression, Q waves inferiorly but narrow, poor R wave progression anteriorly on EKG. Troponin 0.100 1st   will cycle Troponins      Trigger finger, right ring finger 07/23/2019    Diabetes mellitus type 2 in obese ([address] 01/31/2017    Angina effort 11/09/2015     Replacing deprecated diagnoses      CAD S/P percutaneous coronary angioplasty 11/09/2015    Coronary artery disease involving native coronary artery of native heart without angina pectoris 10/29/2015     Overview:   ang mLAD + pLCX st'd diffuse D + dLCX dz  '03  ang dRCAst'd LCA same '05  dRCA 35%ISR LCA same EF 60  5/06  MPI nml EF58  8/07      Mild intermittent asthma without complication 77/48/1315    Type 1 diabetes mellitus [facility]      IDDM      Diabetic retinopathy ([facility]     Asthma     Irritable bowel syndrome     Dysthymia 04/21/2014    HTN (hypertension) 04/21/2014    IDDM (insulin dependent diabetes mellitus) 04/21/2014    CAD (coronary artery disease) 04/21/2014    Dyslipidemia 10/28/2012       HPI   Patient is here today for follow-up on diabetes, mild intermittent asthma, hyper lipidemia, CAD, and complaining of some memory issues and recent symptoms of vertigo. Patient reports for a few years now she is been struggling with remembering things and her memory. She was sent to neurology in the past but no specific treatment or follow-up is recommended. Patient's has a history of multiple concussions in the past for 2 years. She did have a CT scan of her head/brain last September which did not show any acute findings. Patient does complain of some increased stress and irritability.   She is currently followed by cardiology for coronary artery disease and has been stable on current treatment management. Patient also has history of type 1 diabetes and hyperlipidemia. Most recent labs show hemoglobin A1c to be up to 11 (up from 8.1) and last LDL symptom 113. Patient reports she does not have a very good diet and has very little physical activity. Patient denies chest pain, shortness of breath or orthopnea. Denies headache change in vision or any neurologic symptoms. No GI or bowel complaints. She is not watching her blood sugars regularly at home. No episodes of hypoglycemia. Patient is also noticed a couple day supply of symptoms of vertigo. She describes these as feeling of room spinning with movement especially moving her head or turning her head. No syncope. No chest pain or palpitations.   Past Medical History:   Diagnosis Date    Asthma     CAD (coronary artery disease)     Depression     Diabetes ([facility])     IDDM    [facility]     Hyperlipidemia     Hypertension     Irritable bowel syndrome      Social History     Socioeconomic History    Marital status: Single     Spouse name: Not on file    Number of children: Not on file    Years of education: Not on file    Highest education level: Not on file   Occupational History    Not on file   Tobacco Use    Smoking status: Former Smoker     Packs/day: 1.00     Years: 22.00     Pack years: 22.00     Types: Cigarettes     Start date: 1980     Quit date: 2002     Years since quittin.4    Smokeless tobacco: Never Used   Vaping Use    Vaping Use: Never used   Substance and Sexual Activity    Alcohol use: No     Alcohol/week: 0.0 standard drinks    Drug use: No    Sexual activity: Not on file   Other Topics Concern    Not on file   Social History Narrative    Lives by herself     Social Determinants of Health     Financial Resource Strain:     Difficulty of Paying Living Expenses:    Food Insecurity:     Worried About Running Out of Food in the Last Year:     Cecilia of Food in the Last Year:    Transportation Needs:     Lack of Transportation (Medical):  Lack of Transportation (Non-Medical):    Physical Activity:     Days of Exercise per Week:     Minutes of Exercise per Session:    Stress:     Feeling of Stress :    Social Connections:     Frequency of Communication with Friends and Family:     Frequency of Social Gatherings with Friends and Family:     Attends [employer] Services:     Active Member of Clubs or Organizations:     Attends Club or Organization Meetings:     Marital Status:    Intimate Partner Violence:     Fear of Current or Ex-Partner:     Emotionally Abused:     Physically Abused:     Sexually Abused:      Family History   Problem Relation Age of Onset    Depression Mother     Heart Disease Mother     Depression Maternal Grandmother     Heart Disease Maternal Grandmother     Heart Disease Maternal Grandfather     Heart Disease Paternal Aunt        Review of Systems   Constitutional: Negative for activity change, appetite change and unexpected weight change. Respiratory: Negative for cough, chest tightness and shortness of breath. Cardiovascular: Negative for chest pain, palpitations and leg swelling. Gastrointestinal: Negative for abdominal pain, blood in stool, constipation and diarrhea. Musculoskeletal: Negative for arthralgias and myalgias. Skin: Negative for rash. Neurological: Positive for dizziness. Negative for syncope, weakness, light-headedness and headaches. Hematological: Negative for adenopathy. Does not bruise/bleed easily. Psychiatric/Behavioral: Negative for dysphoric mood and sleep disturbance. The patient is not nervous/anxious. OBJECTIVE:  /72   Pulse 66   Temp 97 °F (36.1 °C)   Resp 18   Ht 5' 2\" (1.575 m)   Wt 161 lb 6.4 oz (73.2 kg)   LMP 08/26/2016   SpO2 98%   BMI 29.52 kg/m²   Physical Exam  Vitals and nursing note reviewed.    Constitutional:       Appearance: She is well-developed. Eyes:      Pupils: Pupils are equal, round, and reactive to light. Neck:      Thyroid: No thyromegaly. Vascular: No JVD. Cardiovascular:      Rate and Rhythm: Normal rate and regular rhythm. Pulmonary:      Effort: Pulmonary effort is normal.      Breath sounds: Normal breath sounds. Abdominal:      General: Bowel sounds are normal.      Palpations: Abdomen is soft. Tenderness: There is no abdominal tenderness. Musculoskeletal:      Cervical back: Normal range of motion and neck supple. Skin:     Findings: No rash. Neurological:      General: No focal deficit present. Mental Status: She is alert and oriented to person, place, and time. Cranial Nerves: No cranial nerve deficit. Sensory: No sensory deficit. Motor: No weakness. Gait: Gait normal.      Comments: Negative Romberg   Psychiatric:         Behavior: Behavior normal.         Thought Content: Thought content normal.       BS-420  EKG-NSR, no acute changes    ASSESSMENT/PLAN:    1. Type 1 diabetes mellitus with other specified complication ([facility]  Reviewed recent labs and increase hemoglobin A1c  Reviewed current treatment and insulin use. We did discuss making sure she is using her Humalog regularly and increasing 5 units to 7 units with a sliding scale which was written down for    Encouraged appropriate diet management    Encouraged increased physical activity    Ambulatory blood sugar checks and blood sugar log  - POCT Glucose    2. Vertigo  We will try to avoid prednisone since blood sugars are high. Will try Antivert as needed. We did discuss possible sedation and not driving on the medication    3. Functional memory problem  Reviewed last CT scan    Neurology referral  - Joe Quarles MD, Neurology, [facility]    4. Mild intermittent asthma without complication  No symptoms. We will continue with as needed management    5.  Hyperlipidemia, unspecified hyperlipidemia type  Reviewed recent labs  We will increase Lipitor to 80 mg daily  Appropriate diet management    6. Coronary artery disease involving native coronary artery of native heart without angina pectoris  Continue follow-up with cardiology as advised  - EKG 12 Lead    7. Need for vaccination    Time spent greater than 45 minutes to discuss lab results and exacerbation of chronic illnesses and changes in medications and previous testing and CT scans and potential reasons more memory concerns    Orders Placed This Encounter   Procedures   Viet Marley MD, Neurology, [facility]     Referral Priority:   Routine     Referral Type:   Eval and Treat     Referral Reason:   Specialty Services Required     Referred to Provider:   Medhat Miguel MD     Requested Specialty:   Neurology     Number of Visits Requested:   1    POCT Glucose    EKG 12 Lead     Order Specific Question:   Reason for Exam?     Answer:    Other     Comments:   heart diease     Current Outpatient Medications   Medication Sig Dispense Refill    atorvastatin (LIPITOR) 80 MG tablet Take 1 tablet by mouth daily 30 tablet 1    meclizine (ANTIVERT) 25 MG tablet Take 1 tablet by mouth 2 times daily as needed for Dizziness 20 tablet 0    blood glucose test strips (FREESTYLE LITE) strip USE 1 STRIP TO CHECK GLUCOSE 4 TIMES DAILY AS NEEDED 100 each 1    FLUoxetine (PROZAC) 40 MG capsule Take 1 capsule by mouth daily 30 capsule 2    insulin glargine (LANTUS SOLOSTAR) 100 UNIT/ML injection pen Inject 24 Units into the skin nightly 2 pen 2    Insulin Pen Needle 31G X 5 MM MISC 1 each by Does not apply route daily 100 each 1    Insulin Syringe-Needle U-100 30G X 5/16\" 1 ML MISC 1 each by Does not apply route daily 100 each 2    rosuvastatin (CRESTOR) 40 MG tablet Take 1 tablet by mouth every evening 30 tablet 2    lisinopril (PRINIVIL;ZESTRIL) 5 MG tablet Take 1 tablet by mouth once daily 90 tablet 0    insulin lispro (HUMALOG) 100 UNIT/ML injection vial Inject 5 Units into the skin 3 times daily (before meals) 1 vial 0    Insulin Pen Needle (PEN NEEDLES) 32G X 4 MM MISC USE ONE NEEDLE DAILY WITH BASAGLAR 100 each 1    insulin aspart protamine-insulin aspart (NOVOLOG MIX 70/30) (70-30) 100 UNIT/ML injection use as directed      benzonatate (TESSALON) 200 MG capsule TK ONE C PO  TID PRF COUGH FOR UP TO 14 DAYS      azithromycin (ZITHROMAX) 250 MG tablet ZPK      blood glucose test strips (FREESTYLE LITE) strip USE ONE STRIP TO TEST FOUR TIMES A DAY AND AS NEEDED 100 strip 1    Insulin Pen Needle (PEN NEEDLES) 32G X 4 MM MISC Inject 1 each into the skin 4 times daily 90 each 0    fluticasone (FLONASE) 50 MCG/ACT nasal spray 1 spray by Each Nostril route daily (Patient not taking: Reported on 6/15/2020) 1 Bottle 0    insulin glargine (LANTUS SOLOSTAR) 100 UNIT/ML injection pen Inject 25 Units into the skin nightly 5 pen 2    insulin glargine (LANTUS SOLOSTAR) 100 UNIT/ML injection pen Inject 24 Units into the skin nightly 10 pen 0    COMFORT ASSIST INSULIN SYRINGE 31G X 5/16\" 1 ML MISC Inject 1 each into the skin 2 times daily 100 each 2    FREESTYLE LANCETS MISC Inject 1 each into the skin 2 times daily 100 each 2    Insulin Pen Needle (PEN NEEDLES) 32G X 4 MM MISC ONE NEEDLE - DAILY WITH BASAGLAR 100 each 0    albuterol sulfate HFA (PROAIR HFA) 108 (90 Base) MCG/ACT inhaler Inhale 2 puffs into the lungs every 6 hours as needed for Wheezing 1 Inhaler 0    naproxen sodium (ANAPROX DS) 550 MG tablet Take 1 tablet by mouth 2 times daily (with meals) (Patient not taking: Reported on 6/15/2020) 30 tablet 0    Insulin Syringe-Needle U-100 (B-D INS SYR ULTRAFINE 1CC/31G) 31G X 5/16\" 1 ML MISC Inject 1 each into the skin 3 times daily 150 each 1    blood glucose test strips (ASCENSIA AUTODISC VI;ONE TOUCH ULTRA TEST VI) strip 1 each by In Vitro route 6 times daily The patient tests six times daily.   She uses the freestyle strips 180 each 2    Insulin Pen Needle (PEN NEEDLES) 32G X 4 MM MISC USE ONE NEW NEEDLE DAILY WITH BASAGLAR 100 each 0    Blood Glucose Monitoring Suppl (FREESTYLE LITE) QUAN Inject 1 Device into the skin 4 times daily 1 Device 0    baclofen (LIORESAL) 10 MG tablet TK 1 T PO TID PRF MUSCLE SPASMS  0    polyethylene glycol (GLYCOLAX) powder MIX 17 GRAMS IN LIQUID OF CHOICE AND TK ONCE D PRF CONSTIPATION  1    nitroGLYCERIN (NITROSTAT) 0.3 MG SL tablet       atorvastatin (LIPITOR) 80 MG tablet       metoprolol (TOPROL-XL) 50 MG XL tablet Take 50 mg by mouth      amLODIPine (NORVASC) 5 MG tablet Take 5 mg by mouth      Blood Glucose Monitoring Suppl (ONE TOUCH ULTRA MINI) W/DEVICE KIT 1 kit by Does not apply route daily as needed 1 kit 0    Tuberculin-Allergy Syringes (B-D PLASTIPAK SYRINGES ALLERGY) 28G X 1/2\" 1 ML MISC 1 each by Does not apply route daily as needed 50 each 1    Insulin NPH Isophane & Regular (HUMULIN 70/30 PEN) (70-30) 100 UNIT/ML injection pen Inject 25 Units into the skin      Glucose Blood (GLUCOMETER ELITE TEST VI) by In Vitro route. The patient needs the One Touch Ultra Glucometer. Would not come up in epic.  aspirin 81 MG tablet Take 81 mg by mouth daily.  clopidogrel (PLAVIX) 75 MG tablet Take 75 mg by mouth daily. No current facility-administered medications for this visit. Return in about 6 weeks (around 10/27/2021), or if symptoms worsen or fail to improve, for diabetes, hyperlipidemia, .     Isis Begum MD, MD

## 2021-10-01 DIAGNOSIS — F32.A DEPRESSION, UNSPECIFIED DEPRESSION TYPE: ICD-10-CM

## 2021-10-01 RX ORDER — FLUOXETINE HYDROCHLORIDE 40 MG/1
CAPSULE ORAL
Qty: 30 CAPSULE | Refills: 0 | Status: SHIPPED | OUTPATIENT
Start: 2021-10-01 | End: 2021-12-16 | Stop reason: SDUPTHER

## 2021-11-16 ENCOUNTER — OFFICE VISIT (OUTPATIENT)
Dept: PRIMARY CARE CLINIC | Age: 56
End: 2021-11-16
Payer: COMMERCIAL

## 2021-11-16 VITALS
DIASTOLIC BLOOD PRESSURE: 64 MMHG | HEART RATE: 68 BPM | HEIGHT: 62 IN | TEMPERATURE: 97.8 F | OXYGEN SATURATION: 98 % | SYSTOLIC BLOOD PRESSURE: 128 MMHG | RESPIRATION RATE: 20 BRPM | BODY MASS INDEX: 30.91 KG/M2 | WEIGHT: 168 LBS

## 2021-11-16 DIAGNOSIS — F32.A DEPRESSION, UNSPECIFIED DEPRESSION TYPE: ICD-10-CM

## 2021-11-16 DIAGNOSIS — R41.3 MEMORY DIFFICULTIES: ICD-10-CM

## 2021-11-16 DIAGNOSIS — I25.10 CORONARY ARTERY DISEASE INVOLVING NATIVE CORONARY ARTERY OF NATIVE HEART WITHOUT ANGINA PECTORIS: ICD-10-CM

## 2021-11-16 DIAGNOSIS — E10.69 TYPE 1 DIABETES MELLITUS WITH OTHER SPECIFIED COMPLICATION (HCC): Primary | ICD-10-CM

## 2021-11-16 DIAGNOSIS — E10.319 DIABETIC RETINOPATHY OF BOTH EYES ASSOCIATED WITH TYPE 1 DIABETES MELLITUS, MACULAR EDEMA PRESENCE UNSPECIFIED, UNSPECIFIED RETINOPATHY SEVERITY (HCC): ICD-10-CM

## 2021-11-16 LAB
CHP ED QC CHECK: NORMAL
GLUCOSE BLD-MCNC: 140 MG/DL
HBA1C MFR BLD: 10.5 %

## 2021-11-16 PROCEDURE — G8417 CALC BMI ABV UP PARAM F/U: HCPCS | Performed by: FAMILY MEDICINE

## 2021-11-16 PROCEDURE — G8484 FLU IMMUNIZE NO ADMIN: HCPCS | Performed by: FAMILY MEDICINE

## 2021-11-16 PROCEDURE — 3017F COLORECTAL CA SCREEN DOC REV: CPT | Performed by: FAMILY MEDICINE

## 2021-11-16 PROCEDURE — 82962 GLUCOSE BLOOD TEST: CPT | Performed by: FAMILY MEDICINE

## 2021-11-16 PROCEDURE — 1036F TOBACCO NON-USER: CPT | Performed by: FAMILY MEDICINE

## 2021-11-16 PROCEDURE — 2022F DILAT RTA XM EVC RTNOPTHY: CPT | Performed by: FAMILY MEDICINE

## 2021-11-16 PROCEDURE — G8427 DOCREV CUR MEDS BY ELIG CLIN: HCPCS | Performed by: FAMILY MEDICINE

## 2021-11-16 PROCEDURE — 3046F HEMOGLOBIN A1C LEVEL >9.0%: CPT | Performed by: FAMILY MEDICINE

## 2021-11-16 PROCEDURE — 99214 OFFICE O/P EST MOD 30 MIN: CPT | Performed by: FAMILY MEDICINE

## 2021-11-16 PROCEDURE — 83036 HEMOGLOBIN GLYCOSYLATED A1C: CPT | Performed by: FAMILY MEDICINE

## 2021-11-16 RX ORDER — MECLIZINE HYDROCHLORIDE 25 MG/1
25 TABLET ORAL 3 TIMES DAILY PRN
COMMUNITY

## 2021-11-16 SDOH — ECONOMIC STABILITY: FOOD INSECURITY: WITHIN THE PAST 12 MONTHS, YOU WORRIED THAT YOUR FOOD WOULD RUN OUT BEFORE YOU GOT MONEY TO BUY MORE.: NEVER TRUE

## 2021-11-16 SDOH — ECONOMIC STABILITY: FOOD INSECURITY: WITHIN THE PAST 12 MONTHS, THE FOOD YOU BOUGHT JUST DIDN'T LAST AND YOU DIDN'T HAVE MONEY TO GET MORE.: NEVER TRUE

## 2021-11-16 ASSESSMENT — SOCIAL DETERMINANTS OF HEALTH (SDOH): HOW HARD IS IT FOR YOU TO PAY FOR THE VERY BASICS LIKE FOOD, HOUSING, MEDICAL CARE, AND HEATING?: NOT HARD AT ALL

## 2021-11-16 ASSESSMENT — ENCOUNTER SYMPTOMS
DIARRHEA: 0
ABDOMINAL PAIN: 0
COUGH: 1
VOMITING: 0
SHORTNESS OF BREATH: 0
SORE THROAT: 0
NAUSEA: 0
BLOOD IN STOOL: 0
RHINORRHEA: 0

## 2021-11-16 NOTE — PROGRESS NOTES
Chief Complaint   Patient presents with    New Patient     NP to establish         Luis E Santino is a 64 y.o. female who presents to be established. Pt was born extremely premature at 6 months    Pt has a hx of type 1 diabetes Dx at 12 y/o and is on lantus insulin 24 units daily along with a novolog sliding scale and had a recent elevated HgBA1c level 11.0 on 08/03/2021 and had CBC and CMP bloodwork at that time which was unremarkable except for an elevated glucose 354 Pt reports a FBS reading of 50 this morning    Pt has a hx of diabetic retinopathy and used to be followed by Opthalmology     Pt is on prozac daily for depression with good control    Surgical hx includes appendectomy and hernia repair    Pt did complete her COVID vaccine series    Patient has a past medical history significant for CAD s/p stent placement x 4 most recently last year and is followed by Cardiology and is on crestor, toprol xl, norvasc, Baby ASA and plavix medications and was recently seen back in 07/2021 and was ordered a cardiac stress test which pt did not do yet    Pt is an exsmoker Quit 2002 and used to abuse alcohol but now only drinks occasionally; Pt is a retired correctional counselor    FHx positive for early CAD    Review of Systems   Constitutional: Negative for fatigue and fever. HENT: Negative for congestion, nosebleeds, rhinorrhea and sore throat. Eyes:        Positive blurred vision L eye; Negative diplopia   Respiratory: Positive for cough (dry x 2 days). Negative for shortness of breath. Negative OSORIO   Cardiovascular: Negative for chest pain, palpitations and leg swelling. Gastrointestinal: Negative for abdominal pain, blood in stool, diarrhea, nausea and vomiting. Negative melena or indigestion   Endocrine: Negative for polydipsia and polyuria. Genitourinary: Negative for dysuria and hematuria. Musculoskeletal: Positive for arthralgias. Skin: Negative for rash.    Neurological: Positive for dizziness (at times upon awakening). Negative for seizures, syncope, speech difficulty, weakness and headaches. Psychiatric/Behavioral: Negative for dysphoric mood. The patient is not nervous/anxious. Positive short term memory issues over the past 6 months and pt was recently referred to Neurology by her previous PCP but has not been seen yet         Vitals:    11/16/21 1254   BP: 128/64   Pulse: 68   Resp: 20   Temp: 97.8 °F (36.6 °C)   SpO2: 98%     RBS = 140    HgBA1c = 10.5    Physical Exam  Vitals reviewed. Constitutional:       General: She is not in acute distress. HENT:      Mouth/Throat:      Mouth: Mucous membranes are moist.      Pharynx: Oropharynx is clear. Eyes:      General: No scleral icterus. Comments: Pink conjunctivae    Neck:      Thyroid: No thyromegaly. Comments: No carotid bruits noted B/L  Cardiovascular:      Heart sounds: Normal heart sounds. No murmur heard. No friction rub. No gallop. Pulmonary:      Effort: Pulmonary effort is normal.      Breath sounds: Normal breath sounds. Abdominal:      Palpations: Abdomen is soft. Tenderness: There is no abdominal tenderness. Musculoskeletal:      Comments: No leg edema noted B/L   Lymphadenopathy:      Cervical: No cervical adenopathy. Skin:     Findings: No rash. Neurological:      Mental Status: She is alert. Comments: Cranial nerves 2 - 12 grossly intact; Muscle strength 5/5 throughout   Psychiatric:         Mood and Affect: Mood normal.         ASSESSMENT AND PLAN    1. Type 1 diabetes mellitus with other specified complication [facility])  Patient with an elevated HgBA1c level of 10.5 but normal RBS reading of 140 and was told to follow a stricter diabetic diet; avoiding concentrated sweets and consuming a low carbohydrate diet for better control    2.  Diabetic retinopathy of both eyes associated with type 1 diabetes mellitus, macular edema presence unspecified, unspecified retinopathy severity ([facility]  Pt reports blurred vision in her L eye and will Refer patient to Ophthalmology for evaluation  - Leonard Price MD, Ophthalmology, [facility]    3. Coronary artery disease involving native coronary artery of native heart without angina pectoris  Pt is followed by Cardiology and is clinically stable on present medications and denies any CP or SOB and was told to do cardiac stress test as directed by Cardiology    4. Depression, unspecified depression type  Patient clinically stable on present medications    5. Memory difficulties  May be secondary to her hx of alcohol abuse and pt was told to see Neurology as directed for evaluation. Pt is with a nonfocal neuro exam        Yanely Monsalve MD      Return in about 3 months (around 2/16/2022). Patient Instructions     Patient Education      Go to the ER ASAP if you develop any chest pain, shortness of breath, facial droop, slurred speech, weakness/numbness in your arms or legs or double vision! Learning About Carbohydrate (Carb) Counting and Eating Out When You Have Diabetes  Why plan your meals? Meal planning can be a key part of managing diabetes. Planning meals and snacks with the right balance of carbohydrate, protein, and fat can help you keep your blood sugar at the target level you set with your doctor. You don't have to eat special foods. You can eat what your family eats, including sweets once in a while. But you do have to pay attention to how often you eat and how much you eat of certain foods. You may want to work with a dietitian or a certified diabetes educator. He or she can give you tips and meal ideas and can answer your questions about meal planning. This health professional can also help you reach a healthy weight if that is one of your goals. What should you know about eating carbs? Managing the amount of carbohydrate (carbs) you eat is an important part of healthy meals when you have diabetes.  Carbohydrate is found in many foods. · Learn which foods have carbs. And learn the amounts of carbs in different foods. ? Bread, cereal, pasta, and rice have about 15 grams of carbs in a serving. A serving is 1 slice of bread (1 ounce), ½ cup of cooked cereal, or 1/3 cup of cooked pasta or rice. ? Fruits have 15 grams of carbs in a serving. A serving is 1 small fresh fruit, such as an apple or orange; ½ of a banana; ½ cup of cooked or canned fruit; ½ cup of fruit juice; 1 cup of melon or raspberries; or 2 tablespoons of dried fruit. ? Milk and no-sugar-added yogurt have 15 grams of carbs in a serving. A serving is 1 cup of milk or 2/3 cup of no-sugar-added yogurt. ? Starchy vegetables have 15 grams of carbs in a serving. A serving is ½ cup of mashed potatoes or sweet potato; 1 cup winter squash; ½ of a small baked potato; ½ cup of cooked beans; or ½ cup cooked corn or green peas. · Learn how much carbs to eat each day and at each meal. A dietitian or CDE can teach you how to keep track of the amount of carbs you eat. This is called carbohydrate counting. · If you are not sure how to count carbohydrate grams, use the Plate Method to plan meals. It is a good, quick way to make sure that you have a balanced meal. It also helps you spread carbs throughout the day. ? Divide your plate by types of foods. Put non-starchy vegetables on half the plate, meat or other protein food on one-quarter of the plate, and a grain or starchy vegetable in the final quarter of the plate. To this you can add a small piece of fruit and 1 cup of milk or yogurt, depending on how many carbs you are supposed to eat at a meal.  · Try to eat about the same amount of carbs at each meal. Do not \"save up\" your daily allowance of carbs to eat at one meal.  · Proteins have very little or no carbs per serving. Examples of proteins are beef, chicken, turkey, fish, eggs, tofu, cheese, cottage cheese, and peanut butter.  A serving size of meat is 3 ounces, which is about the size of a deck of cards. Examples of meat substitute serving sizes (equal to 1 ounce of meat) are 1/4 cup of cottage cheese, 1 egg, 1 tablespoon of peanut butter, and ½ cup of tofu. How can you eat out and still eat healthy? · Learn to estimate the serving sizes of foods that have carbohydrate. If you measure food at home, it will be easier to estimate the amount in a serving of restaurant food. · If the meal you order has too much carbohydrate (such as potatoes, corn, or baked beans), ask to have a low-carbohydrate food instead. Ask for a salad or green vegetables. · If you use insulin, check your blood sugar before and after eating out to help you plan how much to eat in the future. · If you eat more carbohydrate at a meal than you had planned, take a walk or do other exercise. This will help lower your blood sugar. What are some tips for eating healthy? · Limit saturated fat, such as the fat from meat and dairy products. This is a healthy choice because people who have diabetes are at higher risk of heart disease. So choose lean cuts of meat and nonfat or low-fat dairy products. Use olive or canola oil instead of butter or shortening when cooking. · Don't skip meals. Your blood sugar may drop too low if you skip meals and take insulin or certain medicines for diabetes. · Check with your doctor before you drink alcohol. Alcohol can cause your blood sugar to drop too low. Alcohol can also cause a bad reaction if you take certain diabetes medicines. Follow-up care is a key part of your treatment and safety. Be sure to make and go to all appointments, and call your doctor if you are having problems. It's also a good idea to know your test results and keep a list of the medicines you take. Where can you learn more? Go to https://chmaeganeb[facility]. org and sign in to your [employer] account.  Enter O904 in the [facility] box to learn more about \"Learning About Carbohydrate (Carb) Counting and Eating Out When You Have Diabetes. \"     If you do not have an account, please click on the \"Sign Up Now\" link. Current as of: December 17, 2020               Content Version: 13.0  © 2006-2021 [employer]. Care instructions adapted under license by [facility] ([facility]). If you have questions about a medical condition or this instruction, always ask your healthcare professional. [address] any warranty or liability for your use of this information.

## 2021-11-16 NOTE — PATIENT INSTRUCTIONS
Patient Education      Go to the ER ASAP if you develop any chest pain, shortness of breath, facial droop, slurred speech, weakness/numbness in your arms or legs or double vision! Learning About Carbohydrate (Carb) Counting and Eating Out When You Have Diabetes  Why plan your meals? Meal planning can be a key part of managing diabetes. Planning meals and snacks with the right balance of carbohydrate, protein, and fat can help you keep your blood sugar at the target level you set with your doctor. You don't have to eat special foods. You can eat what your family eats, including sweets once in a while. But you do have to pay attention to how often you eat and how much you eat of certain foods. You may want to work with a dietitian or a certified diabetes educator. He or she can give you tips and meal ideas and can answer your questions about meal planning. This health professional can also help you reach a healthy weight if that is one of your goals. What should you know about eating carbs? Managing the amount of carbohydrate (carbs) you eat is an important part of healthy meals when you have diabetes. Carbohydrate is found in many foods. · Learn which foods have carbs. And learn the amounts of carbs in different foods. ? Bread, cereal, pasta, and rice have about 15 grams of carbs in a serving. A serving is 1 slice of bread (1 ounce), ½ cup of cooked cereal, or 1/3 cup of cooked pasta or rice. ? Fruits have 15 grams of carbs in a serving. A serving is 1 small fresh fruit, such as an apple or orange; ½ of a banana; ½ cup of cooked or canned fruit; ½ cup of fruit juice; 1 cup of melon or raspberries; or 2 tablespoons of dried fruit. ? Milk and no-sugar-added yogurt have 15 grams of carbs in a serving. A serving is 1 cup of milk or 2/3 cup of no-sugar-added yogurt. ? Starchy vegetables have 15 grams of carbs in a serving.  A serving is ½ cup of mashed potatoes or sweet potato; 1 cup winter squash; ½ of a small baked potato; ½ cup of cooked beans; or ½ cup cooked corn or green peas. · Learn how much carbs to eat each day and at each meal. A dietitian or CDE can teach you how to keep track of the amount of carbs you eat. This is called carbohydrate counting. · If you are not sure how to count carbohydrate grams, use the Plate Method to plan meals. It is a good, quick way to make sure that you have a balanced meal. It also helps you spread carbs throughout the day. ? Divide your plate by types of foods. Put non-starchy vegetables on half the plate, meat or other protein food on one-quarter of the plate, and a grain or starchy vegetable in the final quarter of the plate. To this you can add a small piece of fruit and 1 cup of milk or yogurt, depending on how many carbs you are supposed to eat at a meal.  · Try to eat about the same amount of carbs at each meal. Do not \"save up\" your daily allowance of carbs to eat at one meal.  · Proteins have very little or no carbs per serving. Examples of proteins are beef, chicken, turkey, fish, eggs, tofu, cheese, cottage cheese, and peanut butter. A serving size of meat is 3 ounces, which is about the size of a deck of cards. Examples of meat substitute serving sizes (equal to 1 ounce of meat) are 1/4 cup of cottage cheese, 1 egg, 1 tablespoon of peanut butter, and ½ cup of tofu. How can you eat out and still eat healthy? · Learn to estimate the serving sizes of foods that have carbohydrate. If you measure food at home, it will be easier to estimate the amount in a serving of restaurant food. · If the meal you order has too much carbohydrate (such as potatoes, corn, or baked beans), ask to have a low-carbohydrate food instead. Ask for a salad or green vegetables. · If you use insulin, check your blood sugar before and after eating out to help you plan how much to eat in the future.   · If you eat more carbohydrate at a meal than you had planned, take a walk or do other exercise. This will help lower your blood sugar. What are some tips for eating healthy? · Limit saturated fat, such as the fat from meat and dairy products. This is a healthy choice because people who have diabetes are at higher risk of heart disease. So choose lean cuts of meat and nonfat or low-fat dairy products. Use olive or canola oil instead of butter or shortening when cooking. · Don't skip meals. Your blood sugar may drop too low if you skip meals and take insulin or certain medicines for diabetes. · Check with your doctor before you drink alcohol. Alcohol can cause your blood sugar to drop too low. Alcohol can also cause a bad reaction if you take certain diabetes medicines. Follow-up care is a key part of your treatment and safety. Be sure to make and go to all appointments, and call your doctor if you are having problems. It's also a good idea to know your test results and keep a list of the medicines you take. Where can you learn more? Go to https://Twirl TVpeThounds.PICS Auditing. org and sign in to your P[employer] account. Enter Y365 in the [employer] box to learn more about \"Learning About Carbohydrate (Carb) Counting and Eating Out When You Have Diabetes. \"     If you do not have an account, please click on the \"Sign Up Now\" link. Current as of: December 17, 2020               Content Version: 13.0  © 2672-4792 Healthwise, Incorporated. Care instructions adapted under license by [facility] ([facility]). If you have questions about a medical condition or this instruction, always ask your healthcare professional. [place] [address] any warranty or liability for your use of this information.

## 2021-11-21 DIAGNOSIS — F32.A DEPRESSION, UNSPECIFIED DEPRESSION TYPE: ICD-10-CM

## 2021-11-21 RX ORDER — FLUOXETINE HYDROCHLORIDE 40 MG/1
CAPSULE ORAL
Qty: 30 CAPSULE | Refills: 0 | OUTPATIENT
Start: 2021-11-21

## 2021-12-02 NOTE — TELEPHONE ENCOUNTER
Patient called and asked for her Prozac, patient was dismiss from the office, and she said she needs until she see the new doctor on 31.

## 2021-12-08 RX ORDER — LISINOPRIL 5 MG/1
TABLET ORAL
Qty: 90 TABLET | Refills: 0 | OUTPATIENT
Start: 2021-12-08

## 2021-12-15 RX ORDER — LISINOPRIL 5 MG/1
TABLET ORAL
Qty: 30 TABLET | Refills: 0 | OUTPATIENT
Start: 2021-12-15

## 2021-12-16 DIAGNOSIS — F32.A DEPRESSION, UNSPECIFIED DEPRESSION TYPE: ICD-10-CM

## 2021-12-16 NOTE — TELEPHONE ENCOUNTER
Patient called and states that she was dismissed from the office due to No shows, pt states that she has an appointment on the 31st but needs her medication for her fluoxetine 40 mg, once daily, pt states that she uses [facility] @ [phone], pt also state that she was also being seen for memory loss and states that being dismissed for missing appointments is questionable and unethical.  Please call pt to advise @ [phone]

## 2021-12-17 RX ORDER — FLUOXETINE HYDROCHLORIDE 40 MG/1
40 CAPSULE ORAL DAILY
Qty: 15 CAPSULE | Refills: 0 | Status: SHIPPED | OUTPATIENT
Start: 2021-12-17 | End: 2022-01-04 | Stop reason: SDUPTHER

## 2021-12-23 ENCOUNTER — NURSE TRIAGE (OUTPATIENT)
Dept: OTHER | Facility: CLINIC | Age: 56
End: 2021-12-23

## 2021-12-29 RX ORDER — INSULIN GLARGINE 100 [IU]/ML
INJECTION, SOLUTION SUBCUTANEOUS
Qty: 15 ML | Refills: 0 | OUTPATIENT
Start: 2021-12-29

## 2022-01-04 DIAGNOSIS — E10.69 TYPE 1 DIABETES MELLITUS WITH OTHER SPECIFIED COMPLICATION (HCC): Primary | ICD-10-CM

## 2022-01-04 DIAGNOSIS — F32.A DEPRESSION, UNSPECIFIED DEPRESSION TYPE: ICD-10-CM

## 2022-01-04 RX ORDER — FLUOXETINE HYDROCHLORIDE 40 MG/1
40 CAPSULE ORAL DAILY
Qty: 30 CAPSULE | Refills: 1 | Status: SHIPPED | OUTPATIENT
Start: 2022-01-04 | End: 2022-03-31 | Stop reason: SDUPTHER

## 2022-01-04 RX ORDER — PEN NEEDLE, DIABETIC 30 GX3/16"
NEEDLE, DISPOSABLE MISCELLANEOUS
Qty: 100 EACH | Refills: 3 | Status: SHIPPED | OUTPATIENT
Start: 2022-01-04 | End: 2022-03-31 | Stop reason: SDUPTHER

## 2022-01-04 NOTE — TELEPHONE ENCOUNTER
Medication:   Requested Prescriptions     Pending Prescriptions Disp Refills    FLUoxetine (PROZAC) 40 MG capsule 15 capsule 0     Sig: Take 1 capsule by mouth daily    insulin aspart (NOVOLOG) 100 UNIT/ML injection vial 10 mL 3     Sig: Inject into the skin 3 times daily (before meals)    Insulin Pen Needle (PEN NEEDLES) 32G X 4 MM MISC 100 each 0    blood glucose test strips (ASCENSIA AUTODISC VI;ONE TOUCH ULTRA TEST VI) strip 180 each 2     Si each by In Vitro route 6 times daily The patient tests six times daily. She uses the freestyle strips        Last Filled:      Patient Phone Number: [phone] (home)     Last appt: 2021   Next appt: 2/15/2022    Last OARRS: No flowsheet data found.

## 2022-01-04 NOTE — TELEPHONE ENCOUNTER
Patient requesting (2) medication refills.     Medication PROZAC  Dosage 40 MG  Frequency DAILY    Medication NOVOLOG insulin aspart [facility])        Pharmacy [address] [facility], [address] [place] [address] office visit 2/15/22

## 2022-01-04 NOTE — TELEPHONE ENCOUNTER
Spoke with patient, she is unsure if the Novolog is a pen or a vial.  She was not at home so she couldn't check. She will call back once she gets home to confirm.

## 2022-01-04 NOTE — TELEPHONE ENCOUNTER
E-Prescribing Status: Receipt confirmed by pharmacy (1/4/2022  3:39 PM EST)    No further action required.

## 2022-01-05 DIAGNOSIS — E10.69 TYPE 1 DIABETES MELLITUS WITH OTHER SPECIFIED COMPLICATION (HCC): Primary | ICD-10-CM

## 2022-01-28 ENCOUNTER — VIRTUAL VISIT (OUTPATIENT)
Dept: PRIMARY CARE CLINIC | Age: 57
End: 2022-01-28
Payer: COMMERCIAL

## 2022-01-28 ENCOUNTER — TELEPHONE (OUTPATIENT)
Dept: PRIMARY CARE CLINIC | Age: 57
End: 2022-01-28

## 2022-01-28 DIAGNOSIS — R05.9 COUGH: Primary | ICD-10-CM

## 2022-01-28 DIAGNOSIS — R53.82 CHRONIC FATIGUE: ICD-10-CM

## 2022-01-28 DIAGNOSIS — E10.69 TYPE 1 DIABETES MELLITUS WITH OTHER SPECIFIED COMPLICATION (HCC): ICD-10-CM

## 2022-01-28 PROCEDURE — G8484 FLU IMMUNIZE NO ADMIN: HCPCS | Performed by: NURSE PRACTITIONER

## 2022-01-28 PROCEDURE — G8417 CALC BMI ABV UP PARAM F/U: HCPCS | Performed by: NURSE PRACTITIONER

## 2022-01-28 PROCEDURE — 3017F COLORECTAL CA SCREEN DOC REV: CPT | Performed by: NURSE PRACTITIONER

## 2022-01-28 PROCEDURE — 3046F HEMOGLOBIN A1C LEVEL >9.0%: CPT | Performed by: NURSE PRACTITIONER

## 2022-01-28 PROCEDURE — 1036F TOBACCO NON-USER: CPT | Performed by: NURSE PRACTITIONER

## 2022-01-28 PROCEDURE — 99214 OFFICE O/P EST MOD 30 MIN: CPT | Performed by: NURSE PRACTITIONER

## 2022-01-28 PROCEDURE — G8427 DOCREV CUR MEDS BY ELIG CLIN: HCPCS | Performed by: NURSE PRACTITIONER

## 2022-01-28 PROCEDURE — 2022F DILAT RTA XM EVC RTNOPTHY: CPT | Performed by: NURSE PRACTITIONER

## 2022-01-28 RX ORDER — BENZONATATE 100 MG/1
100-200 CAPSULE ORAL 3 TIMES DAILY PRN
Qty: 60 CAPSULE | Refills: 0 | Status: SHIPPED | OUTPATIENT
Start: 2022-01-28 | End: 2022-02-04

## 2022-01-28 ASSESSMENT — ENCOUNTER SYMPTOMS
SHORTNESS OF BREATH: 0
COUGH: 1
WHEEZING: 0

## 2022-01-28 NOTE — TELEPHONE ENCOUNTER
Spoke with patient and was scheduled VV with NP at [facility]. No further action required.

## 2022-01-28 NOTE — PROGRESS NOTES
2022    TELEHEALTH EVALUATION -- Audio/Visual (During MVRQB-52 public health emergency)    HPI:    Martha Pate (: 1965) has requested an audio/video evaluation for the following concern(s):    Patient has fever, cough and congestion. Patient said low energy. Patient started with symptoms yesterday. Patient did not check temp but she feels feverish. She does have chills. Patient does not have body aches. Patient can not remember if she tested herself for covid. Patient said her memory is getting worse. Patient lives alone. Patient has had 2 vaccines. Patient does drive. Patient was ordered to take DUI class and she has been around a bunch strangers. Patient is wearing mask. Patient blood sugars are elevated since she has been sick. Patient said she saw dr Atiya Huber for his last blood sugars. Patient sugars have been running 300. She is a type 1 diabetic. Review of Systems   Constitutional: Positive for chills, fatigue and fever. HENT: Positive for congestion and postnasal drip. Respiratory: Positive for cough. Negative for shortness of breath and wheezing. Neurological: Positive for headaches. All other systems reviewed and are negative. PHYSICAL EXAMINATION:  [ INSTRUCTIONS:  \"[x]\" Indicates a positive item  \"[]\" Indicates a negative item  -- DELETE ALL ITEMS NOT EXAMINED]  Vital Signs: (As obtained by patient/caregiver or practitioner observation)    Patient-Reported Vitals 2022   Patient-Reported Weight 168lb 165lbs   Patient-Reported Height 62in 5 2         Constitutional: [x] Appears well-developed and well-nourished [x] No apparent distress      [] Abnormal-   Mental status  [x] Alert and awake  [x] Oriented to person/place/time [x]Able to follow commands      Eyes:  EOM    [x]  Normal  [] Abnormal-  Sclera  []  Normal  [] Abnormal -         Discharge []  None visible  [] Abnormal -    HENT:   [x] Normocephalic, atraumatic.   [] Abnormal   [] Mouth/Throat: Mucous membranes are moist.     External Ears [x] Normal  [] Abnormal-     Neck: [x] No visualized mass     Pulmonary/Chest: [x] Respiratory effort normal.  [x] No visualized signs of difficulty breathing or respiratory distress        [] Abnormal-      Musculoskeletal:   [x] Normal gait with no signs of ataxia         [] Normal range of motion of neck        [] Abnormal-       Neurological:        [x] No Facial Asymmetry (Cranial nerve 7 motor function) (limited exam to video visit)          [] No gaze palsy        [] Abnormal-         Skin:        [x] No significant exanthematous lesions or discoloration noted on facial skin         [] Abnormal-            Psychiatric:       [x] Normal Affect [x] No Hallucinations        [] Abnormal-     Other pertinent observable physical exam findings-     ASSESSMENT/PLAN:  1. Cough  Patient has a cough we will send in [facility] she is that does wake her up at night. Patient is not short of breath will continue monitoring symptoms from home patient is vaccinated. - benzonatate (TESSALON) 100 MG capsule; Take 1-2 capsules by mouth 3 times daily as needed for Cough  Dispense: 60 capsule; Refill: 0    2. Chronic fatigue  Patient is been very fatigued suspect she could have Covid will get Covid test gave patient formation for [facility]     3. Type 1 diabetes mellitus with other specified complication (HCC)  Patient's blood sugars have been running high. Talk to patient about getting a DEXA, she will talk to her primary care about this. Patient would benefit from a continuous glucose monitor and insulin pump patient is concerned about the insulin pump. Patient will continue monitoring blood sugars from home patient does have test reps at her pharmacy patient will increase her Lantus to 26 units currently giving herself 24. I do believe patient would benefit from 30 units she is hesitant to do this due to her blood sugars dropping at night.   If patient's blood sugars continue to run high she will contact her PCP. No follow-ups on file. Darien Frey is a 62 y.o. female being evaluated by a Virtual Visit (video visit) encounter to address concerns as mentioned above. A caregiver was present when appropriate. Due to this being a TeleHealth encounter (During WPGNQ-24 public health emergency), evaluation of the following organ systems was limited: Vitals/Constitutional/EENT/Resp/CV/GI//MS/Neuro/Skin/Heme-Lymph-Imm. Pursuant to the emergency declaration under the [address] [place] [address], [address] [place] [address] authority and the [employer] Act, this Virtual Visit was conducted with patient's (and/or legal guardian's) consent, to reduce the patient's risk of exposure to COVID-19 and provide necessary medical care. The patient (and/or legal guardian) has also been advised to contact this office for worsening conditions or problems, and seek emergency medical treatment and/or call 911 if deemed necessary. Patient identification was verified at the start of the visit: Yes    Not billed for time. Services were provided through a video synchronous discussion virtually to substitute for in-person clinic visit. Patient and provider were located at their individual homes. --Page Level, APRN - CNP on 1/28/2022 at 2:09 PM    An electronic signature was used to authenticate this note. This dictation was generated by voice recognition computer software. Although all attempts are made to edit the dictation for accuracy, there may be errors in the transcription that were not intended.

## 2022-01-28 NOTE — LETTER
[facility]   [address], [address]  Phone: [phone]  Fax: [phone]    DESIRAE Hernandez CNP        January 28, 2022     Patient: Leonard Frey   YOB: 1965   Date of Visit: 1/28/2022       To Whom It May Concern: It is my medical opinion that Amie Etienne should be excused from class due to her covid symptoms recommend patient quarantining 5 days and get tested. Patient started with symptoms 1/27/21. If you have any questions or concerns, please don't hesitate to call.     Sincerely,        DESIRAE Hernandez CNP

## 2022-01-28 NOTE — TELEPHONE ENCOUNTER
----- Message from Christopher Puga sent at 1/28/2022 12:51 PM EST -----  Subject: Message to Provider    QUESTIONS  Information for Provider? patient is needing excuse for not attending   class yesterday and today due to illness; states she spoke with someone in   the office and was unable to get an appt but could get a note.   ---------------------------------------------------------------------------  --------------  [address]  What is the best way for the office to contact you? OK to leave message on   voicemail  Preferred Call Back Phone Number? [phone]  ---------------------------------------------------------------------------  --------------  SCRIPT ANSWERS  Relationship to Patient?  Self

## 2022-03-31 ENCOUNTER — OFFICE VISIT (OUTPATIENT)
Dept: PRIMARY CARE CLINIC | Age: 57
End: 2022-03-31
Payer: COMMERCIAL

## 2022-03-31 VITALS
OXYGEN SATURATION: 96 % | RESPIRATION RATE: 16 BRPM | SYSTOLIC BLOOD PRESSURE: 114 MMHG | TEMPERATURE: 97.8 F | HEART RATE: 82 BPM | BODY MASS INDEX: 31.1 KG/M2 | DIASTOLIC BLOOD PRESSURE: 68 MMHG | HEIGHT: 62 IN | WEIGHT: 169 LBS

## 2022-03-31 DIAGNOSIS — I25.10 CORONARY ARTERY DISEASE INVOLVING NATIVE CORONARY ARTERY OF NATIVE HEART WITHOUT ANGINA PECTORIS: Primary | ICD-10-CM

## 2022-03-31 DIAGNOSIS — F32.A DEPRESSION, UNSPECIFIED DEPRESSION TYPE: ICD-10-CM

## 2022-03-31 DIAGNOSIS — E10.69 TYPE 1 DIABETES MELLITUS WITH OTHER SPECIFIED COMPLICATION (HCC): ICD-10-CM

## 2022-03-31 DIAGNOSIS — R41.3 MEMORY DIFFICULTIES: ICD-10-CM

## 2022-03-31 DIAGNOSIS — E10.319 DIABETIC RETINOPATHY OF BOTH EYES ASSOCIATED WITH TYPE 1 DIABETES MELLITUS, MACULAR EDEMA PRESENCE UNSPECIFIED, UNSPECIFIED RETINOPATHY SEVERITY (HCC): ICD-10-CM

## 2022-03-31 PROCEDURE — G8417 CALC BMI ABV UP PARAM F/U: HCPCS | Performed by: FAMILY MEDICINE

## 2022-03-31 PROCEDURE — 1036F TOBACCO NON-USER: CPT | Performed by: FAMILY MEDICINE

## 2022-03-31 PROCEDURE — G8484 FLU IMMUNIZE NO ADMIN: HCPCS | Performed by: FAMILY MEDICINE

## 2022-03-31 PROCEDURE — 99214 OFFICE O/P EST MOD 30 MIN: CPT | Performed by: FAMILY MEDICINE

## 2022-03-31 PROCEDURE — 3046F HEMOGLOBIN A1C LEVEL >9.0%: CPT | Performed by: FAMILY MEDICINE

## 2022-03-31 PROCEDURE — 2022F DILAT RTA XM EVC RTNOPTHY: CPT | Performed by: FAMILY MEDICINE

## 2022-03-31 PROCEDURE — 3017F COLORECTAL CA SCREEN DOC REV: CPT | Performed by: FAMILY MEDICINE

## 2022-03-31 PROCEDURE — G8427 DOCREV CUR MEDS BY ELIG CLIN: HCPCS | Performed by: FAMILY MEDICINE

## 2022-03-31 RX ORDER — PEN NEEDLE, DIABETIC 30 GX3/16"
NEEDLE, DISPOSABLE MISCELLANEOUS
Qty: 100 EACH | Refills: 3 | Status: SHIPPED | OUTPATIENT
Start: 2022-03-31 | End: 2022-10-06 | Stop reason: SDUPTHER

## 2022-03-31 RX ORDER — BLOOD SUGAR DIAGNOSTIC
1 STRIP MISCELLANEOUS 3 TIMES DAILY
Qty: 150 EACH | Refills: 1 | Status: SHIPPED | OUTPATIENT
Start: 2022-03-31

## 2022-03-31 RX ORDER — FLUOXETINE HYDROCHLORIDE 40 MG/1
40 CAPSULE ORAL DAILY
Qty: 30 CAPSULE | Refills: 2 | Status: SHIPPED | OUTPATIENT
Start: 2022-03-31 | End: 2022-07-21 | Stop reason: SDUPTHER

## 2022-03-31 RX ORDER — ROSUVASTATIN CALCIUM 40 MG/1
40 TABLET, COATED ORAL EVERY EVENING
Qty: 30 TABLET | Refills: 2 | Status: SHIPPED | OUTPATIENT
Start: 2022-03-31 | End: 2022-07-26

## 2022-03-31 RX ORDER — INSULIN GLARGINE 100 [IU]/ML
24 INJECTION, SOLUTION SUBCUTANEOUS NIGHTLY
Qty: 3 PEN | Refills: 3 | Status: SHIPPED | OUTPATIENT
Start: 2022-03-31 | End: 2022-10-06 | Stop reason: SDUPTHER

## 2022-03-31 ASSESSMENT — ENCOUNTER SYMPTOMS
NAUSEA: 0
ABDOMINAL PAIN: 0
SHORTNESS OF BREATH: 0
DIARRHEA: 0
COUGH: 0
BLOOD IN STOOL: 0
SORE THROAT: 0
VOMITING: 0

## 2022-03-31 NOTE — PATIENT INSTRUCTIONS
Patient Education      Go to the ER ASAP if you develop any chest pain, shortness of breath, facial droop, slurred speech, weakness/numbness in your arms or legs or double vision! Learning About Carbohydrate (Carb) Counting and Eating Out When You Have Diabetes  Why plan your meals? Meal planning can be a key part of managing diabetes. Planning meals and snacks with the right balance of carbohydrate, protein, and fat can help you keep yourblood sugar at the target level you set with your doctor. You don't have to eat special foods. You can eat what your family eats, including sweets once in a while. But you do have to pay attention to how oftenyou eat and how much you eat of certain foods. You may want to work with a dietitian or a diabetes educator. They can give you tips and meal ideas and can answer your questions about meal planning. This health professional can also help you reach a healthy weight if that is one ofyour goals. What should you know about eating carbs? Managing the amount of carbohydrate (carbs) you eat is an important part ofhealthy meals when you have diabetes. Carbohydrate is found in many foods.  Learn which foods have carbs. And learn the amounts of carbs in different foods. ? Bread, cereal, pasta, and rice have about 15 grams of carbs in a serving. A serving is 1 slice of bread (1 ounce), ½ cup of cooked cereal, or 1/3 cup of cooked pasta or rice. ? Fruits have 15 grams of carbs in a serving. A serving is 1 small fresh fruit, such as an apple or orange; ½ of a banana; ½ cup of cooked or canned fruit; ½ cup of fruit juice; 1 cup of melon or raspberries; or 2 tablespoons of dried fruit. ? Milk and no-sugar-added yogurt have 15 grams of carbs in a serving. A serving is 1 cup of milk or 3/4 cup (6 oz) of no-sugar-added yogurt. ? Starchy vegetables have 15 grams of carbs in a serving.  A serving is ½ cup of mashed potatoes or sweet potato; 1 cup winter squash; ½ of a small baked potato; ½ cup of cooked beans; or ½ cup cooked corn or green peas.  Learn how much carbs to eat each day and at each meal. A dietitian or certified diabetes educator can teach you how to keep track of the amount of carbs you eat. This is called carbohydrate counting.  If you are not sure how to count carbohydrate grams, use the plate method to plan meals. It is a quick way to make sure that you have a balanced meal. It also can help you manage the amount of carbohydrate you eat at meals. ? Divide your plate by types of foods. Put non-starchy vegetables on half the plate, meat or other protein food on one-quarter of the plate, and a grain or starchy vegetable in the final quarter of the plate. To this you can add a small piece of fruit and 1 cup of milk or yogurt, depending on how many carbs you are supposed to eat at a meal.   Try to eat about the same amount of carbs at each meal. Do not \"save up\" your daily allowance of carbs to eat at one meal.   Proteins have very little or no carbs. Examples of proteins are beef, chicken, turkey, fish, eggs, tofu, cheese, cottage cheese, and peanut butter. How can you eat out and still eat healthy?  Learn to estimate the serving sizes of foods that have carbohydrate. If you measure food at home, it will be easier to estimate the amount in a serving of restaurant food.  If the meal you order has too much carbohydrate (such as potatoes, corn, or baked beans), ask to have a low-carbohydrate food instead. Ask for a salad or non-starchy vegetables like broccoli, cauliflower, green beans, or peppers.  If you eat more carbohydrate at a meal than you had planned, take a walk or do other exercise. This will help lower your blood sugar. What are some tips for eating healthy?  Limit saturated fat, such as the fat from meat and dairy products. This is a healthy choice because people who have diabetes are at higher risk of heart disease.  So choose lean cuts of meat and nonfat or low-fat dairy products. Use olive or canola oil instead of butter or shortening when cooking.  Don't skip meals. Your blood sugar may drop too low if you skip meals and take insulin or certain medicines for diabetes.  Check with your doctor before you drink alcohol. Alcohol can cause your blood sugar to drop too low. Alcohol can also cause a bad reaction if you take certain diabetes medicines. Follow-up care is a key part of your treatment and safety. Be sure to make and go to all appointments, and call your doctor if you are having problems. It's also a good idea to know your test results and keep alist of the medicines you take. Where can you learn more? Go to https://AuthenticlickpeWeole Energyeb.VideoGenie. org and sign in to your [employer] account. Enter M590 in the U.Gene.us box to learn more about \"Learning About Carbohydrate (Carb) Counting and Eating Out When You Have Diabetes. \"     If you do not have an account, please click on the \"Sign Up Now\" link. Current as of: September 8, 2021               Content Version: 13.2  © 4781-6662 Healthwise, Incorporated. Care instructions adapted under license by [facility] ([facility]). If you have questions about a medical condition or this instruction, always ask your healthcare professional. [address]  any warranty or liability for your use of this information.

## 2022-03-31 NOTE — PROGRESS NOTES
Chief Complaint   Patient presents with    Diabetes     follow up visit, medication refill         Teresa Loja is a 62 y.o. female who presents for routine visit regarding diabetes, CAD and depression. Patient has been noncompliant with followup office visits. Patient states that she was arrested for a DUI on [address] and was in shelter for several weeks. Pt did not see Ophthalmology or Neurology as directed    Pt was born extremely premature at 6 months     Pt has a hx of type 1 diabetes Dx at 12 y/o and is on lantus insulin 24 units daily along with a novolog sliding scale and reports a FBS reading of 100 this morning     Pt has a hx of diabetic retinopathy and used to be followed by Opthalmology      Pt is on prozac daily for depression with good control     Surgical hx includes appendectomy and hernia repair     Pt did complete her COVID vaccine series     Patient has a past medical history significant for CAD s/p stent placement x 4 most recently last year and is followed by Cardiology and is on crestor, toprol xl, norvasc, Baby ASA and plavix medications and was recently seen back in 07/2021 and was ordered a cardiac stress test which pt did not do yet     Pt is an exsmoker Quit 2002 and used to abuse alcohol but now only drinks occasionally; Pt is a retired correctional counselor     FHx positive for early CAD      Review of Systems   Constitutional: Negative for fatigue and fever. HENT: Negative for congestion, nosebleeds and sore throat. Eyes:        Positive blurred vision L eye; Negative diplopia   Respiratory: Negative for cough and shortness of breath. Negative OSORIO   Cardiovascular: Negative for chest pain, palpitations and leg swelling. Gastrointestinal: Negative for abdominal pain, blood in stool, diarrhea, nausea and vomiting. Negative melena or indigestion   Endocrine: Negative for polydipsia and polyuria. Genitourinary: Negative for dysuria and hematuria. Musculoskeletal: Positive for arthralgias. Skin: Negative for rash. Neurological: Positive for dizziness (at times). Negative for seizures, syncope, speech difficulty, weakness and headaches. Psychiatric/Behavioral: Negative for dysphoric mood. The patient is not nervous/anxious. Positive short term memory issues over the past 6 months and pt was recently referred to Neurology by her previous PCP but has not been seen yet         Vitals:    03/31/22 1121   BP: 114/68   Pulse: 82   Resp: 16   Temp: 97.8 °F (36.6 °C)   SpO2: 96%         Physical Exam  Vitals reviewed. Constitutional:       General: She is not in acute distress. HENT:      Mouth/Throat:      Mouth: Mucous membranes are moist.      Pharynx: Oropharynx is clear. Eyes:      General: No scleral icterus. Comments: Pink conjunctivae    Neck:      Thyroid: No thyromegaly. Comments: No carotid bruits noted B/L  Cardiovascular:      Heart sounds: Normal heart sounds. No murmur heard. No friction rub. No gallop. Pulmonary:      Effort: Pulmonary effort is normal.      Breath sounds: Normal breath sounds. Abdominal:      Palpations: Abdomen is soft. Tenderness: There is no abdominal tenderness. Musculoskeletal:      Comments: No leg edema noted B/L   Lymphadenopathy:      Cervical: No cervical adenopathy. Skin:     Findings: No rash. Neurological:      Mental Status: She is alert. Comments: Cranial nerves 2 - 12 grossly intact; Muscle strength 5/5 throughout   Psychiatric:         Mood and Affect: Mood normal.         ASSESSMENT AND PLAN    1. Type 1 diabetes mellitus with other specified complication [facility])  Patient clinically stable on present medications and will check fasting bloodwork  - blood glucose test strips (ASCENSIA AUTODISC VI;ONE TOUCH ULTRA TEST VI) strip; 1 each by In Vitro route daily Test as directed,Dispense according to insurance formulary - OneTouch  Dispense: 100 each;  Refill: 3  - insulin aspart (NOVOLOG) 100 UNIT/ML injection vial; Inject 8 Units into the skin 3 times daily (before meals)  Dispense: 10 mL; Refill: 2  - insulin glargine (LANTUS SOLOSTAR) 100 UNIT/ML injection pen; Inject 24 Units into the skin nightly  Dispense: 3 pen; Refill: 3  - Insulin Pen Needle (PEN NEEDLES) 32G X 4 MM MISC; USE ONE NEW NEEDLE DAILY  Dispense: 100 each; Refill: 3  - Insulin Syringe-Needle U-100 (B-D INS SYR ULTRAFINE 1CC/31G) 31G X 5/16\" 1 ML MISC; Inject 1 each into the skin 3 times daily  Dispense: 150 each; Refill: 1  - Comprehensive Metabolic Panel; Future  - TSH with Reflex; Future  - Hemoglobin A1C; Future    2. Depression, unspecified depression type  Patient clinically stable on present medications  - FLUoxetine (PROZAC) 40 MG capsule; Take 1 capsule by mouth daily  Dispense: 30 capsule; Refill: 2    3. Coronary artery disease involving native coronary artery of native heart without angina pectoris  Pt is followed by Cardiology and is clinically stable on present medications and denies any CP or SOB and was told to do cardiac stress test as directed by Cardiology!  - rosuvastatin (CRESTOR) 40 MG tablet; Take 1 tablet by mouth every evening  Dispense: 30 tablet; Refill: 2  - CBC with Auto Differential; Future  - Comprehensive Metabolic Panel; Future  - TSH with Reflex; Future  - Lipid Panel; Future    4. Diabetic retinopathy of both eyes associated with type 1 diabetes mellitus, macular edema presence unspecified, unspecified retinopathy severity ([address]  Pt reports blurred vision in her L eye at times and was told to see Ophthalmology as directed    5. Memory difficulties  May be secondary to her hx of alcohol abuse and pt was told to see Neurology as directed for evaluation. Pt is with a nonfocal neuro exam  - Chrissy Owens MD, Neurology, [facility]  - TSH with Reflex; Jacob Sanchez MD      Return in about 3 months (around 6/30/2022).        Patient Instructions     Patient Education      Go to the ER ASAP if you develop any chest pain, shortness of breath, facial droop, slurred speech, weakness/numbness in your arms or legs or double vision! Learning About Carbohydrate (Carb) Counting and Eating Out When You Have Diabetes  Why plan your meals? Meal planning can be a key part of managing diabetes. Planning meals and snacks with the right balance of carbohydrate, protein, and fat can help you keep yourblood sugar at the target level you set with your doctor. You don't have to eat special foods. You can eat what your family eats, including sweets once in a while. But you do have to pay attention to how oftenyou eat and how much you eat of certain foods. You may want to work with a dietitian or a diabetes educator. They can give you tips and meal ideas and can answer your questions about meal planning. This health professional can also help you reach a healthy weight if that is one ofyour goals. What should you know about eating carbs? Managing the amount of carbohydrate (carbs) you eat is an important part ofhealthy meals when you have diabetes. Carbohydrate is found in many foods.  Learn which foods have carbs. And learn the amounts of carbs in different foods. ? Bread, cereal, pasta, and rice have about 15 grams of carbs in a serving. A serving is 1 slice of bread (1 ounce), ½ cup of cooked cereal, or 1/3 cup of cooked pasta or rice. ? Fruits have 15 grams of carbs in a serving. A serving is 1 small fresh fruit, such as an apple or orange; ½ of a banana; ½ cup of cooked or canned fruit; ½ cup of fruit juice; 1 cup of melon or raspberries; or 2 tablespoons of dried fruit. ? Milk and no-sugar-added yogurt have 15 grams of carbs in a serving. A serving is 1 cup of milk or 3/4 cup (6 oz) of no-sugar-added yogurt. ? Starchy vegetables have 15 grams of carbs in a serving.  A serving is ½ cup of mashed potatoes or sweet potato; 1 cup winter squash; ½ of a small baked potato; low-fat dairy products. Use olive or canola oil instead of butter or shortening when cooking.  Don't skip meals. Your blood sugar may drop too low if you skip meals and take insulin or certain medicines for diabetes.  Check with your doctor before you drink alcohol. Alcohol can cause your blood sugar to drop too low. Alcohol can also cause a bad reaction if you take certain diabetes medicines. Follow-up care is a key part of your treatment and safety. Be sure to make and go to all appointments, and call your doctor if you are having problems. It's also a good idea to know your test results and keep alist of the medicines you take. Where can you learn more? Go to [email or web address]: [email or web address] and sign in to your [employer] account. Enter E413 in the [employer] box to learn more about \"Learning About Carbohydrate (Carb) Counting and Eating Out When You Have Diabetes. \"     If you do not have an account, please click on the \"Sign Up Now\" link. Current as of: September 8, 2021               Content Version: 13.2  © 4013-3438 Healthwise, Incorporated. Care instructions adapted under license by [facility] ([facility]). If you have questions about a medical condition or this instruction, always ask your healthcare professional. [address] any warranty or liability for your use of this information.

## 2022-04-10 DIAGNOSIS — F32.A DEPRESSION, UNSPECIFIED DEPRESSION TYPE: ICD-10-CM

## 2022-04-10 RX ORDER — FLUOXETINE HYDROCHLORIDE 40 MG/1
CAPSULE ORAL
Qty: 30 CAPSULE | Refills: 2 | OUTPATIENT
Start: 2022-04-10

## 2022-06-02 ENCOUNTER — TELEMEDICINE (OUTPATIENT)
Dept: PRIMARY CARE CLINIC | Age: 57
End: 2022-06-02
Payer: COMMERCIAL

## 2022-06-02 ENCOUNTER — TELEPHONE (OUTPATIENT)
Dept: ADMINISTRATIVE | Age: 57
End: 2022-06-02

## 2022-06-02 DIAGNOSIS — J45.20 MILD INTERMITTENT ASTHMA WITHOUT COMPLICATION: ICD-10-CM

## 2022-06-02 DIAGNOSIS — J40 BRONCHITIS: Primary | ICD-10-CM

## 2022-06-02 PROCEDURE — 99214 OFFICE O/P EST MOD 30 MIN: CPT | Performed by: FAMILY MEDICINE

## 2022-06-02 PROCEDURE — G8417 CALC BMI ABV UP PARAM F/U: HCPCS | Performed by: FAMILY MEDICINE

## 2022-06-02 PROCEDURE — 1036F TOBACCO NON-USER: CPT | Performed by: FAMILY MEDICINE

## 2022-06-02 PROCEDURE — G8427 DOCREV CUR MEDS BY ELIG CLIN: HCPCS | Performed by: FAMILY MEDICINE

## 2022-06-02 PROCEDURE — 3017F COLORECTAL CA SCREEN DOC REV: CPT | Performed by: FAMILY MEDICINE

## 2022-06-02 RX ORDER — ALBUTEROL SULFATE 90 UG/1
2 AEROSOL, METERED RESPIRATORY (INHALATION) 3 TIMES DAILY
Qty: 18 G | Refills: 0 | Status: SHIPPED | OUTPATIENT
Start: 2022-06-02

## 2022-06-02 RX ORDER — AMOXICILLIN AND CLAVULANATE POTASSIUM 875; 125 MG/1; MG/1
1 TABLET, FILM COATED ORAL 2 TIMES DAILY
Qty: 20 TABLET | Refills: 0 | Status: SHIPPED | OUTPATIENT
Start: 2022-06-02 | End: 2022-06-12

## 2022-06-02 ASSESSMENT — ENCOUNTER SYMPTOMS
VOMITING: 0
SORE THROAT: 1
ABDOMINAL PAIN: 0
NAUSEA: 1
SHORTNESS OF BREATH: 0
COUGH: 1
WHEEZING: 1
DIARRHEA: 0

## 2022-06-02 ASSESSMENT — PATIENT HEALTH QUESTIONNAIRE - PHQ9
8. MOVING OR SPEAKING SO SLOWLY THAT OTHER PEOPLE COULD HAVE NOTICED. OR THE OPPOSITE, BEING SO FIGETY OR RESTLESS THAT YOU HAVE BEEN MOVING AROUND A LOT MORE THAN USUAL: 0
SUM OF ALL RESPONSES TO PHQ9 QUESTIONS 1 & 2: 0
9. THOUGHTS THAT YOU WOULD BE BETTER OFF DEAD, OR OF HURTING YOURSELF: 0
SUM OF ALL RESPONSES TO PHQ QUESTIONS 1-9: 0
6. FEELING BAD ABOUT YOURSELF - OR THAT YOU ARE A FAILURE OR HAVE LET YOURSELF OR YOUR FAMILY DOWN: 0
3. TROUBLE FALLING OR STAYING ASLEEP: 0
SUM OF ALL RESPONSES TO PHQ QUESTIONS 1-9: 0
7. TROUBLE CONCENTRATING ON THINGS, SUCH AS READING THE NEWSPAPER OR WATCHING TELEVISION: 0
4. FEELING TIRED OR HAVING LITTLE ENERGY: 0
SUM OF ALL RESPONSES TO PHQ QUESTIONS 1-9: 0
1. LITTLE INTEREST OR PLEASURE IN DOING THINGS: 0
2. FEELING DOWN, DEPRESSED OR HOPELESS: 0
5. POOR APPETITE OR OVEREATING: 0
SUM OF ALL RESPONSES TO PHQ QUESTIONS 1-9: 0
10. IF YOU CHECKED OFF ANY PROBLEMS, HOW DIFFICULT HAVE THESE PROBLEMS MADE IT FOR YOU TO DO YOUR WORK, TAKE CARE OF THINGS AT HOME, OR GET ALONG WITH OTHER PEOPLE: 0

## 2022-06-02 NOTE — TELEPHONE ENCOUNTER
PA COVER MY MEDS  Medication:Albuterol Sulfate  (90 Base)MCG/ACT aerosol       Key:[id number] - PA Case ID: [id number] - Rx #: [id number]  Status:PENDING

## 2022-06-02 NOTE — PROGRESS NOTES
2022    TELEHEALTH EVALUATION -- Audio/Visual (During [facility])    HPI:    Deuce Terrell (:  1965) has requested an audio/video evaluation for the following concern(s):    Patient presents complaining of fatigue, fever 101.7 F and a productive cough x 3 days along with elevated blood sugars around 328 Pt denies any sick contacts and did complete her COVID vaccine series    Pt has a hx of asthma and uses albuterol as needed when exposed to cold weather    Pt did not do bloodwork ordered at last office visit    Pt was born extremely premature at 6 months     Pt has a hx of type 1 diabetes Dx at 12 y/o and is on lantus insulin 24 units daily along with a novolog sliding scale      Pt is on prozac daily for depression with good control     Surgical hx includes appendectomy and hernia repair     Patient has a past medical history significant for CAD s/p stent placement x 4 most recently last year and is followed by Cardiology and is on crestor, toprol xl, norvasc, Baby ASA and plavix medications      Pt is an exsmoker Quit  and used to abuse alcohol but now only drinks occasionally; Pt is a retired correctional counselor     FHx positive for early CAD      Review of Systems   Constitutional: Positive for chills, fatigue and fever. HENT: Positive for postnasal drip and sore throat (mild). Negative for congestion and nosebleeds. Negative loss of taste or smell   Respiratory: Positive for cough (productive) and wheezing (with OSORIO at times). Negative for shortness of breath. Cardiovascular: Negative for chest pain, palpitations and leg swelling. Gastrointestinal: Positive for nausea. Negative for abdominal pain, diarrhea and vomiting. Endocrine: Negative for polydipsia and polyuria. Genitourinary: Negative for dysuria and hematuria. Musculoskeletal: Negative for arthralgias. Skin: Negative for rash. Neurological: Positive for headaches.  Negative for dizziness, seizures and syncope. Psychiatric/Behavioral: Negative for dysphoric mood. The patient is not nervous/anxious. Prior to Visit Medications    Medication Sig Taking?  Authorizing Provider   amoxicillin-clavulanate (AUGMENTIN) 875-125 MG per tablet Take 1 tablet by mouth 2 times daily for 10 days Yes Mike Jose MD   albuterol sulfate HFA (VENTOLIN HFA) 108 (90 Base) MCG/ACT inhaler Inhale 2 puffs into the lungs 3 times daily For 1 week then as needed for wheezing/SOB Yes Mike Jose MD   blood glucose test strips (ASCENSIA AUTODISC VI;ONE TOUCH ULTRA TEST VI) strip 1 each by In Vitro route daily Test as directed,Dispense according to insurance Shae Andrade MD   FLUoxetine (PROZAC) 40 MG capsule Take 1 capsule by mouth daily Yes Mike Jose MD   insulin aspart (NOVOLOG) 100 UNIT/ML injection vial Inject 8 Units into the skin 3 times daily (before meals) Yes Franky Gregg MD   Insulin Pen Needle (PEN NEEDLES) 32G X 4 MM MISC USE ONE NEW NEEDLE DAILY Yes Franky Gregg MD   Insulin Syringe-Needle U-100 (B-D INS SYR ULTRAFINE 1CC/31G) 31G X 5/16\" 1 ML MISC Inject 1 each into the skin 3 times daily Yes Mike Jose MD   rosuvastatin (CRESTOR) 40 MG tablet Take 1 tablet by mouth every evening Yes Mike Jose MD   meclizine (ANTIVERT) 25 MG tablet Take 25 mg by mouth 3 times daily as needed  Yes Historical Provider, MD   lisinopril (PRINIVIL;ZESTRIL) 5 MG tablet Take 1 tablet by mouth once daily Yes Lucas Varma MD   albuterol sulfate HFA (PROAIR HFA) 108 (90 Base) MCG/ACT inhaler Inhale 2 puffs into the lungs every 6 hours as needed for Wheezing Yes DESIRAE Rubio CNP   nitroGLYCERIN (NITROSTAT) 0.3 MG SL tablet  Yes Historical Provider, MD   metoprolol (TOPROL-XL) 50 MG XL tablet Take 50 mg by mouth Yes Historical Provider, MD   amLODIPine (NORVASC) 5 MG tablet Take 5 mg by mouth Yes Historical Provider, MD   Blood Glucose Monitoring Suppl (ONE TOUCH ULTRA MINI) W/DEVICE KIT 1 kit by Does not apply route daily as needed Yes Ever Kyaw, MD   aspirin 81 MG tablet Take 81 mg by mouth daily. Yes Historical Provider, MD   clopidogrel (PLAVIX) 75 MG tablet Take 75 mg by mouth daily. Yes Historical Provider, MD   insulin glargine (LANTUS SOLOSTAR) 100 UNIT/ML injection pen Inject 24 Units into the skin nightly  Emily Page MD       Social History     Tobacco Use    Smoking status: Former Smoker     Packs/day: 1.00     Years: 22.00     Pack years: 22.00     Types: Cigarettes     Start date: 1980     Quit date: 2002     Years since quittin.1    Smokeless tobacco: Never Used   Vaping Use    Vaping Use: Never used   Substance Use Topics    Alcohol use: No     Alcohol/week: 0.0 standard drinks    Drug use: No        No Known Allergies    PHYSICAL EXAMINATION: --- Unable to do adequate video secondary to technical difficulties  [ INSTRUCTIONS:  \"[x]\" Indicates a positive item     Vital Signs: (As obtained by patient/caregiver or practitioner observation)    Blood pressure-  Heart rate-    Respiratory rate-    Temperature-  Pulse oximetry-     Constitutional: [x] Appears well-developed and well-nourished [] No apparent distress      [] Abnormal-   Mental status  [x] Alert and awake  [] Oriented to person/place/time []Able to follow commands      Eyes:  EOM    []  Normal  [] Abnormal-  Sclera  []  Normal  [] Abnormal -         Discharge []  None visible  [] Abnormal -    HENT:   [] Normocephalic, atraumatic.   [] Abnormal   [] Mouth/Throat: Mucous membranes are moist.     External Ears [] Normal  [] Abnormal-     Neck: [] No visualized mass     Pulmonary/Chest: [x] Respiratory effort normal.  [x] No visualized signs of difficulty breathing or respiratory distress        [] Abnormal-      Musculoskeletal:   [] Normal gait with no signs of ataxia         [] Normal range of motion of neck        [] Abnormal-       Neurological:        [] No Facial Asymmetry (Cranial nerve 7 motor function) (limited exam to video visit)          [] No gaze palsy        [] Abnormal-         Skin:        [] No significant exanthematous lesions or discoloration noted on facial skin         [] Abnormal-            Psychiatric:       [x] Normal Affect [] No Hallucinations        [] Abnormal-     Other pertinent observable physical exam findings-     ASSESSMENT/PLAN:  1. Bronchitis/ Mild intermittent asthma without complication  Will treat pt with antibiotics along with a bronchodilator and patient was told to go to the ER ASAP if you note no improvement in 3 - 4 days or develop any worsening SOB or chest pain!  - amoxicillin-clavulanate (AUGMENTIN) 875-125 MG per tablet; Take 1 tablet by mouth 2 times daily for 10 days  Dispense: 20 tablet; Refill: 0  - albuterol sulfate HFA (VENTOLIN HFA) 108 (90 Base) MCG/ACT inhaler; Inhale 2 puffs into the lungs 3 times daily For 1 week then as needed for wheezing/SOB  Dispense: 18 g; Refill: 0      Return in about 1 month (around 7/2/2022). Lyssa Crisostomo, was evaluated through a synchronous (real-time) audio-video encounter. The patient (or guardian if applicable) is aware that this is a billable service, which includes applicable co-pays. This Virtual Visit was conducted with patient's (and/or legal guardian's) consent. The visit was conducted pursuant to the emergency declaration under the [facility], [address] [place] [address] authority and the [employer] General Act. Patient identification was verified, and a caregiver was present when appropriate. The patient was located at Home: [address] [place] [address] [address]  [place] [id number]. Provider was located at [facility] (Appt Dept): [address] [place] [address].        Total time spent on this encounter: Not billed by time    --Ralph Louis MD on 6/2/2022 at 12:33 PM    An electronic signature was used to authenticate this note.

## 2022-07-21 DIAGNOSIS — F32.A DEPRESSION, UNSPECIFIED DEPRESSION TYPE: ICD-10-CM

## 2022-07-21 RX ORDER — FLUOXETINE HYDROCHLORIDE 40 MG/1
40 CAPSULE ORAL DAILY
Qty: 30 CAPSULE | Refills: 2 | Status: SHIPPED | OUTPATIENT
Start: 2022-07-21 | End: 2022-10-06 | Stop reason: SDUPTHER

## 2022-07-21 NOTE — TELEPHONE ENCOUNTER
---------------------------------------------------------------------------  --------------,  Name of Medication? rosuvastatin (CRESTOR) 40 MG tablet  Patient-reported dosage and instructions? Take 1 tablet by mouth every   evening  How many days do you have left? 0  Preferred Pharmacy? [facility]  Pharmacy phone number (if available)? [phone]  Additional Information for Provider? pt states not out just getting early   so she hs it   ---------------------------------------------------------------------------  --------------  CALL BACK INFO  What is the best way for the office to contact you? OK to leave message on   voicemail  Preferred Call Back Phone Number? [phone]  ---------------------------------------------------------------------------  --------------  SCRIPT ANSWERS  Relationship to Patient?  Self

## 2022-07-25 DIAGNOSIS — I25.10 CORONARY ARTERY DISEASE INVOLVING NATIVE CORONARY ARTERY OF NATIVE HEART WITHOUT ANGINA PECTORIS: ICD-10-CM

## 2022-07-25 NOTE — TELEPHONE ENCOUNTER
Medication:   Requested Prescriptions     Pending Prescriptions Disp Refills    rosuvastatin (CRESTOR) 40 MG tablet [Pharmacy Med Name: Rosuvastatin Calcium 40 MG Oral Tablet] 30 tablet 0     Sig: TAKE 1 TABLET BY MOUTH ONCE DAILY IN THE EVENING       Last Filled:      Patient Phone Number: [phone] (home)     Last appt: 6/2/2022   Next appt: Visit date not found    Last Lipid:   Lab Results   Component Value Date/Time    CHOL 187 08/03/2021 02:28 PM    TRIG 62 08/03/2021 02:28 PM    HDL 62 08/03/2021 02:28 PM    [address] 08/03/2021 02:28 PM

## 2022-07-26 RX ORDER — ROSUVASTATIN CALCIUM 40 MG/1
TABLET, COATED ORAL
Qty: 30 TABLET | Refills: 1 | Status: SHIPPED | OUTPATIENT
Start: 2022-07-26 | End: 2022-09-15

## 2022-09-14 DIAGNOSIS — I25.10 CORONARY ARTERY DISEASE INVOLVING NATIVE CORONARY ARTERY OF NATIVE HEART WITHOUT ANGINA PECTORIS: ICD-10-CM

## 2022-09-14 NOTE — TELEPHONE ENCOUNTER
Medication:   Requested Prescriptions     Pending Prescriptions Disp Refills    rosuvastatin (CRESTOR) 40 MG tablet [Pharmacy Med Name: Rosuvastatin Calcium 40 MG Oral Tablet] 30 tablet 0     Sig: TAKE 1 TABLET BY MOUTH ONCE DAILY IN THE EVENING       Last Filled:  7/26/2022.  #30 with 1 refill    Patient Phone Number: [phone] (home)     Last appt: 6/2/2022   Next appt: Visit date not found    Last Lipid:   Lab Results   Component Value Date/Time    CHOL 187 08/03/2021 02:28 PM    TRIG 62 08/03/2021 02:28 PM    HDL 62 08/03/2021 02:28 PM    [address] 08/03/2021 02:28 PM

## 2022-09-15 RX ORDER — ROSUVASTATIN CALCIUM 40 MG/1
TABLET, COATED ORAL
Qty: 30 TABLET | Refills: 1 | Status: SHIPPED | OUTPATIENT
Start: 2022-09-15 | End: 2022-10-12 | Stop reason: SDUPTHER

## 2022-09-23 ENCOUNTER — TELEPHONE (OUTPATIENT)
Dept: PRIMARY CARE CLINIC | Age: 57
End: 2022-09-23

## 2022-09-23 NOTE — TELEPHONE ENCOUNTER
Patient called office stating she had been seen virtually with Dr. Lucy Callahan on 06/02 whom then ordered a COVID test, but that Dr. Lucy Callahan told her to stay quarantined until results came back. She has been fighting a tanner with Michel Chen in regards to her classes for her recent DUI, and then she missed court. She would like a letter to give to the Patric Coates stating that she did in fact get seen and that she was in quarantine for 5 days from 06/02-06/07. She would like this sent to Sara Hawley, her . Email: [email or web address]     Please advise if okay to write letter for patient. Routing to Dr. Lucy Callahan.

## 2022-09-23 NOTE — TELEPHONE ENCOUNTER
Patient was seen and treated for bronchitis and asthma and no COVID test was ordered so letter can only state that pt was sick with these conditions during those dates

## 2022-10-06 ENCOUNTER — OFFICE VISIT (OUTPATIENT)
Dept: PRIMARY CARE CLINIC | Age: 57
End: 2022-10-06
Payer: COMMERCIAL

## 2022-10-06 VITALS
TEMPERATURE: 96.8 F | DIASTOLIC BLOOD PRESSURE: 72 MMHG | HEIGHT: 62 IN | WEIGHT: 166 LBS | HEART RATE: 99 BPM | SYSTOLIC BLOOD PRESSURE: 158 MMHG | BODY MASS INDEX: 30.55 KG/M2 | OXYGEN SATURATION: 97 %

## 2022-10-06 DIAGNOSIS — I25.10 CORONARY ARTERY DISEASE INVOLVING NATIVE CORONARY ARTERY OF NATIVE HEART WITHOUT ANGINA PECTORIS: Primary | ICD-10-CM

## 2022-10-06 DIAGNOSIS — E10.69 TYPE 1 DIABETES MELLITUS WITH OTHER SPECIFIED COMPLICATION (HCC): ICD-10-CM

## 2022-10-06 DIAGNOSIS — F32.A DEPRESSION, UNSPECIFIED DEPRESSION TYPE: ICD-10-CM

## 2022-10-06 DIAGNOSIS — E10.319 DIABETIC RETINOPATHY OF BOTH EYES ASSOCIATED WITH TYPE 1 DIABETES MELLITUS, MACULAR EDEMA PRESENCE UNSPECIFIED, UNSPECIFIED RETINOPATHY SEVERITY (HCC): ICD-10-CM

## 2022-10-06 PROCEDURE — 99214 OFFICE O/P EST MOD 30 MIN: CPT | Performed by: FAMILY MEDICINE

## 2022-10-06 PROCEDURE — G8427 DOCREV CUR MEDS BY ELIG CLIN: HCPCS | Performed by: FAMILY MEDICINE

## 2022-10-06 PROCEDURE — 1036F TOBACCO NON-USER: CPT | Performed by: FAMILY MEDICINE

## 2022-10-06 PROCEDURE — 2022F DILAT RTA XM EVC RTNOPTHY: CPT | Performed by: FAMILY MEDICINE

## 2022-10-06 PROCEDURE — G8417 CALC BMI ABV UP PARAM F/U: HCPCS | Performed by: FAMILY MEDICINE

## 2022-10-06 PROCEDURE — 3046F HEMOGLOBIN A1C LEVEL >9.0%: CPT | Performed by: FAMILY MEDICINE

## 2022-10-06 PROCEDURE — G8484 FLU IMMUNIZE NO ADMIN: HCPCS | Performed by: FAMILY MEDICINE

## 2022-10-06 PROCEDURE — 3017F COLORECTAL CA SCREEN DOC REV: CPT | Performed by: FAMILY MEDICINE

## 2022-10-06 PROCEDURE — 36415 COLL VENOUS BLD VENIPUNCTURE: CPT | Performed by: FAMILY MEDICINE

## 2022-10-06 RX ORDER — BLOOD-GLUCOSE METER
1 KIT MISCELLANEOUS 4 TIMES DAILY
Qty: 1 KIT | Refills: 0 | Status: SHIPPED | OUTPATIENT
Start: 2022-10-06

## 2022-10-06 RX ORDER — PEN NEEDLE, DIABETIC 30 GX3/16"
NEEDLE, DISPOSABLE MISCELLANEOUS
Qty: 200 EACH | Refills: 3 | Status: SHIPPED | OUTPATIENT
Start: 2022-10-06

## 2022-10-06 RX ORDER — BLOOD SUGAR DIAGNOSTIC
1 STRIP MISCELLANEOUS 3 TIMES DAILY
Qty: 150 EACH | Refills: 1 | Status: CANCELLED | OUTPATIENT
Start: 2022-10-06

## 2022-10-06 RX ORDER — INSULIN GLARGINE 100 [IU]/ML
24 INJECTION, SOLUTION SUBCUTANEOUS NIGHTLY
Qty: 3 ADJUSTABLE DOSE PRE-FILLED PEN SYRINGE | Refills: 3 | Status: SHIPPED | OUTPATIENT
Start: 2022-10-06 | End: 2022-11-05

## 2022-10-06 RX ORDER — INSULIN ASPART 100 [IU]/ML
INJECTION, SOLUTION INTRAVENOUS; SUBCUTANEOUS
COMMUNITY
Start: 2022-09-06

## 2022-10-06 RX ORDER — FLUOXETINE HYDROCHLORIDE 40 MG/1
40 CAPSULE ORAL DAILY
Qty: 30 CAPSULE | Refills: 2 | Status: SHIPPED | OUTPATIENT
Start: 2022-10-06

## 2022-10-06 RX ORDER — INSULIN ASPART 100 [IU]/ML
8 INJECTION, SOLUTION INTRAVENOUS; SUBCUTANEOUS
Qty: 5 ADJUSTABLE DOSE PRE-FILLED PEN SYRINGE | Refills: 2 | Status: SHIPPED | OUTPATIENT
Start: 2022-10-06

## 2022-10-06 ASSESSMENT — ENCOUNTER SYMPTOMS
SORE THROAT: 0
VOMITING: 0
SHORTNESS OF BREATH: 0
COUGH: 0
ABDOMINAL PAIN: 0
BLOOD IN STOOL: 0
NAUSEA: 0

## 2022-10-06 NOTE — PROGRESS NOTES
Chief Complaint   Patient presents with    Follow-up         Ludwin Marley is a 62 y.o. female who presents for routine visit regarding diabetes, CAD and depression. Patient has been noncompliant with followup office visits. Pt did not see Ophthalmology or Neurology as directed and did not do ordered bloodwork    Pt was born extremely premature at 6 months     Pt has a hx of type 1 diabetes Dx at 12 y/o and is on lantus insulin 24 units daily along with a novolog sliding scale and reports a FBS readings between [de-identified] -120     Pt has a hx of diabetic retinopathy and used to be followed by Opthalmology      Pt is on prozac daily for depression with good control     Surgical hx includes appendectomy and hernia repair     Pt did complete her COVID vaccine series     Patient has a past medical history significant for CAD s/p stent placement x 4 most recently last year and is followed by Cardiology and is on crestor, toprol xl, norvasc, Baby ASA and plavix medications and was recently seen back in 07/2021 and was ordered a cardiac stress test which pt did not do yet     Pt is an exsmoker Quit 2002 and used to abuse alcohol but now only drinks occasionally; Pt is a retired correctional counselor     FHx positive for early CAD      Review of Systems   Constitutional:  Negative for fatigue and fever. HENT:  Negative for congestion, nosebleeds and sore throat. Eyes:         Positive blurred vision L eye; Negative diplopia   Respiratory:  Negative for cough and shortness of breath. Cardiovascular:  Negative for chest pain, palpitations and leg swelling. Gastrointestinal:  Negative for abdominal pain, blood in stool, nausea and vomiting. Negative melena or indigestion   Endocrine: Negative for polydipsia and polyuria. Genitourinary:  Negative for dysuria and hematuria. Musculoskeletal:  Negative for arthralgias. Skin:  Negative for rash.    Neurological:  Negative for dizziness, seizures, syncope, speech difficulty, weakness and headaches. Psychiatric/Behavioral:  Negative for dysphoric mood. The patient is not nervous/anxious. Positive short term memory issues but has improved since last office visit       Vitals:    10/06/22 1352   BP: (!) 158/72   Pulse: 99   Temp: 96.8 °F (36 °C)   SpO2: 97%         Physical Exam  Vitals reviewed. Constitutional:       General: She is not in acute distress. HENT:      Mouth/Throat:      Mouth: Mucous membranes are moist.      Pharynx: Oropharynx is clear. Eyes:      General: No scleral icterus. Comments: Pink conjunctivae    Neck:      Thyroid: No thyromegaly. Comments: No carotid bruits noted B/L  Cardiovascular:      Heart sounds: Normal heart sounds. No murmur heard. No friction rub. No gallop. Pulmonary:      Effort: Pulmonary effort is normal.      Breath sounds: Normal breath sounds. Abdominal:      Palpations: Abdomen is soft. Tenderness: no abdominal tenderness   Musculoskeletal:      Comments: No leg edema noted B/L   Lymphadenopathy:      Cervical: No cervical adenopathy. Skin:     Findings: No rash. Neurological:      Mental Status: She is alert. Comments: Cranial nerves 2 - 12 grossly intact; Muscle strength 5/5 throughout   Psychiatric:         Mood and Affect: Mood normal.       ASSESSMENT AND PLAN    1. Type 1 diabetes mellitus with other specified complication [facility])  Patient clinically stable on present medications and needs to do bloodwork as directed!  - Insulin Pen Needle (PEN NEEDLES) 32G X 4 MM MISC; USE ONE NEW NEEDLE DAILY  Dispense: 200 each; Refill: 3  - Blood Glucose Monitoring Suppl (ONE TOUCH ULTRA MINI) w/Device KIT; 1 kit by Does not apply route in the morning, at noon, in the evening, and at bedtime  Dispense: 1 kit; Refill: 0  - insulin glargine (LANTUS SOLOSTAR) 100 UNIT/ML injection pen; Inject 24 Units into the skin nightly  Dispense: 3 Adjustable Dose Pre-filled Pen Syringe;  Refill: 3  - CBC with Auto Differential  - Comprehensive Metabolic Panel  - Hemoglobin A1C  - TSH with Reflex  - insulin aspart (NOVOLOG FLEXPEN) 100 UNIT/ML injection pen; Inject 8 Units into the skin 3 times daily (before meals)  Dispense: 5 Adjustable Dose Pre-filled Pen Syringe; Refill: 2    2. Depression, unspecified depression type  Patient clinically stable on present medications  - FLUoxetine (PROZAC) 40 MG capsule; Take 1 capsule by mouth daily  Dispense: 30 capsule; Refill: 2    3. Coronary artery disease involving native coronary artery of native heart without angina pectoris  Pt is followed by Cardiology and is clinically stable on present medications and denies any CP or SOB   - CBC with Auto Differential  - Comprehensive Metabolic Panel  - Hemoglobin A1C  - TSH with Reflex    4. Diabetic retinopathy of both eyes associated with type 1 diabetes mellitus, macular edema presence unspecified, unspecified retinopathy severity ([address]  Pt reports blurred vision in her L eye at times and was told to see Ophthalmology as directed!  - Belkys Andrade MD, Ophthalmology, Mila Obrien MD      Return in about 2 months (around 12/6/2022). Patient Instructions     Go to the ER ASAP if you develop any chest pain, shortness of breath, facial droop, slurred speech, weakness/numbness in your arms or legs or double vision!

## 2022-10-07 LAB
A/G RATIO: 1.8 (ref 1.1–2.2)
ALBUMIN SERPL-MCNC: 4.5 G/DL (ref 3.4–5)
ALP BLD-CCNC: 116 U/L (ref 40–129)
ALT SERPL-CCNC: 19 U/L (ref 10–40)
ANION GAP SERPL CALCULATED.3IONS-SCNC: 15 MMOL/L (ref 3–16)
AST SERPL-CCNC: 20 U/L (ref 15–37)
BASOPHILS ABSOLUTE: 0.1 K/UL (ref 0–0.2)
BASOPHILS RELATIVE PERCENT: 1.4 %
BILIRUB SERPL-MCNC: 0.4 MG/DL (ref 0–1)
BUN BLDV-MCNC: 15 MG/DL (ref 7–20)
CALCIUM SERPL-MCNC: 9.8 MG/DL (ref 8.3–10.6)
CHLORIDE BLD-SCNC: 99 MMOL/L (ref 99–110)
CO2: 22 MMOL/L (ref 21–32)
CREAT SERPL-MCNC: 0.7 MG/DL (ref 0.6–1.1)
EOSINOPHILS ABSOLUTE: 0.2 K/UL (ref 0–0.6)
EOSINOPHILS RELATIVE PERCENT: 3 %
ESTIMATED AVERAGE GLUCOSE: 243.2 MG/DL
GFR AFRICAN AMERICAN: >60
GFR NON-AFRICAN AMERICAN: >60
GLUCOSE BLD-MCNC: 308 MG/DL (ref 70–99)
HBA1C MFR BLD: 10.1 %
HCT VFR BLD CALC: 38.5 % (ref 36–48)
HEMOGLOBIN: 13 G/DL (ref 12–16)
LYMPHOCYTES ABSOLUTE: 1.3 K/UL (ref 1–5.1)
LYMPHOCYTES RELATIVE PERCENT: 24.6 %
MCH RBC QN AUTO: 29.6 PG (ref 26–34)
MCHC RBC AUTO-ENTMCNC: 33.8 G/DL (ref 31–36)
MCV RBC AUTO: 87.6 FL (ref 80–100)
MONOCYTES ABSOLUTE: 0.3 K/UL (ref 0–1.3)
MONOCYTES RELATIVE PERCENT: 6.3 %
NEUTROPHILS ABSOLUTE: 3.4 K/UL (ref 1.7–7.7)
NEUTROPHILS RELATIVE PERCENT: 64.7 %
PDW BLD-RTO: 12.2 % (ref 12.4–15.4)
PLATELET # BLD: 333 K/UL (ref 135–450)
PMV BLD AUTO: 8.4 FL (ref 5–10.5)
POTASSIUM SERPL-SCNC: 5 MMOL/L (ref 3.5–5.1)
RBC # BLD: 4.39 M/UL (ref 4–5.2)
SODIUM BLD-SCNC: 136 MMOL/L (ref 136–145)
TOTAL PROTEIN: 7 G/DL (ref 6.4–8.2)
TSH REFLEX: 1.94 UIU/ML (ref 0.27–4.2)
WBC # BLD: 5.2 K/UL (ref 4–11)

## 2022-10-11 ENCOUNTER — TELEPHONE (OUTPATIENT)
Dept: PRIMARY CARE CLINIC | Age: 57
End: 2022-10-11

## 2022-10-11 NOTE — TELEPHONE ENCOUNTER
----- Message from [facility] sent at 10/11/2022  2:45 PM EDT -----  Subject: Refill Request    QUESTIONS  Name of Medication? FLUoxetine (PROZAC) 40 MG capsule  Patient-reported dosage and instructions? Once a day  How many days do you have left? 0  Preferred Pharmacy? [address]  Pharmacy phone number (if available)? [phone]  Additional Information for Provider? Call cell # [phone] if no answer   at land line  ---------------------------------------------------------------------------  --------------  CALL BACK INFO  What is the best way for the office to contact you? OK to leave message on   voicemail  Preferred Call Back Phone Number? [phone]  ---------------------------------------------------------------------------  --------------  SCRIPT ANSWERS  Relationship to Patient?  Self

## 2022-10-11 NOTE — TELEPHONE ENCOUNTER
Meds were sent in at appointment on 10/6/2022. Patient notified and verbalized understanding. No further action is required.

## 2022-10-12 DIAGNOSIS — I25.10 CORONARY ARTERY DISEASE INVOLVING NATIVE CORONARY ARTERY OF NATIVE HEART WITHOUT ANGINA PECTORIS: ICD-10-CM

## 2022-10-12 RX ORDER — ROSUVASTATIN CALCIUM 40 MG/1
TABLET, COATED ORAL
Qty: 30 TABLET | Refills: 2 | Status: SHIPPED | OUTPATIENT
Start: 2022-10-12

## 2022-10-12 RX ORDER — AMLODIPINE BESYLATE 5 MG/1
5 TABLET ORAL DAILY
Qty: 30 TABLET | Refills: 2 | Status: SHIPPED | OUTPATIENT
Start: 2022-10-12

## 2022-10-12 RX ORDER — CLOPIDOGREL BISULFATE 75 MG/1
75 TABLET ORAL DAILY
Qty: 30 TABLET | Refills: 2 | Status: SHIPPED | OUTPATIENT
Start: 2022-10-12

## 2022-10-12 RX ORDER — LISINOPRIL 5 MG/1
TABLET ORAL
Qty: 90 TABLET | Refills: 0 | Status: CANCELLED | OUTPATIENT
Start: 2022-10-12

## 2022-10-12 RX ORDER — METOPROLOL SUCCINATE 50 MG/1
50 TABLET, EXTENDED RELEASE ORAL DAILY
Qty: 30 TABLET | Refills: 2 | Status: SHIPPED | OUTPATIENT
Start: 2022-10-12

## 2022-12-14 ENCOUNTER — TELEPHONE (OUTPATIENT)
Dept: PRIMARY CARE CLINIC | Age: 57
End: 2022-12-14

## 2022-12-14 DIAGNOSIS — F32.A DEPRESSION, UNSPECIFIED DEPRESSION TYPE: ICD-10-CM

## 2022-12-14 RX ORDER — FLUOXETINE HYDROCHLORIDE 40 MG/1
40 CAPSULE ORAL DAILY
Qty: 30 CAPSULE | Refills: 1 | Status: SHIPPED | OUTPATIENT
Start: 2022-12-14

## 2022-12-14 NOTE — TELEPHONE ENCOUNTER
Medication:   Requested Prescriptions     Pending Prescriptions Disp Refills    FLUoxetine (PROZAC) 40 MG capsule 30 capsule 2     Sig: Take 1 capsule by mouth daily        Last Filled:  10/6/2022, #30 with 2 refills    Patient Phone Number: [phone] (home)     Last appt: 10/6/2022   Next appt: 12/21/2022    Last OARRS: No flowsheet data found.

## 2022-12-14 NOTE — TELEPHONE ENCOUNTER
Patient came in to the office and got her appointment days mixed up. The patient said she is completely out of her:    FLUoxetine (PROZAC) 40 MG Capsule  TAKE 1 CAPSULE BY MOUTH DAILY     The patient had a \"No Show\" on 12/7/2022. The patient is re-scheduled for next Wednesday 12/21/2022. The patient requested that we send in her medication since she has an up-coming appointment and she has been out for so long. The patient said she is not doing well and actually said \"She feels like killing someone\". Please Advise.      Patient Contact: [phone]

## 2023-02-02 DIAGNOSIS — I25.10 CORONARY ARTERY DISEASE INVOLVING NATIVE CORONARY ARTERY OF NATIVE HEART WITHOUT ANGINA PECTORIS: ICD-10-CM

## 2023-02-02 DIAGNOSIS — F32.A DEPRESSION, UNSPECIFIED DEPRESSION TYPE: ICD-10-CM

## 2023-02-02 RX ORDER — ROSUVASTATIN CALCIUM 40 MG/1
TABLET, COATED ORAL
Qty: 30 TABLET | Refills: 1 | Status: SHIPPED | OUTPATIENT
Start: 2023-02-02

## 2023-02-02 RX ORDER — AMLODIPINE BESYLATE 5 MG/1
5 TABLET ORAL DAILY
Qty: 30 TABLET | Refills: 1 | Status: SHIPPED | OUTPATIENT
Start: 2023-02-02

## 2023-02-02 RX ORDER — LISINOPRIL 5 MG/1
5 TABLET ORAL DAILY
Qty: 30 TABLET | Refills: 1 | Status: SHIPPED | OUTPATIENT
Start: 2023-02-02

## 2023-02-02 RX ORDER — FLUOXETINE HYDROCHLORIDE 40 MG/1
40 CAPSULE ORAL DAILY
Qty: 30 CAPSULE | Refills: 1 | Status: SHIPPED | OUTPATIENT
Start: 2023-02-02

## 2023-02-02 RX ORDER — CLOPIDOGREL BISULFATE 75 MG/1
75 TABLET ORAL DAILY
Qty: 30 TABLET | Refills: 1 | Status: SHIPPED | OUTPATIENT
Start: 2023-02-02

## 2023-02-02 NOTE — TELEPHONE ENCOUNTER
Patient called to request refills on her a few medications.  The patient is requesting her:    FLUoxetine (PROZAC) 40 MG capsule  Take 1 capsule by mouth daily    amLODIPine (NORVASC) 5 MG tablet  Take 1 tablet by mouth daily    clopidogrel (PLAVIX) 75 MG tablet  Take 1 tablet by mouth daily    lisinopril (PRINIVIL;ZESTRIL) 5 MG tablet  Take 1 tablet by mouth once daily    rosuvastatin (CRESTOR) 40 MG tablet  TAKE 1 TABLET BY MOUTH ONCE DAILY IN THE EVENING    Please Advise    Patient XOMAYSI:[phone]

## 2023-02-02 NOTE — TELEPHONE ENCOUNTER
Medication:   Requested Prescriptions     Pending Prescriptions Disp Refills    amLODIPine (NORVASC) 5 MG tablet 30 tablet 2     Sig: Take 1 tablet by mouth daily    FLUoxetine (PROZAC) 40 MG capsule 30 capsule 2     Sig: Take 1 capsule by mouth daily    clopidogrel (PLAVIX) 75 MG tablet 30 tablet 2     Sig: Take 1 tablet by mouth daily    lisinopril (PRINIVIL;ZESTRIL) 5 MG tablet 30 tablet 2     Sig: Take 1 tablet by mouth daily Take 1 tablet by mouth once daily    rosuvastatin (CRESTOR) 40 MG tablet 30 tablet 2     Sig: TAKE 1 TABLET BY MOUTH ONCE DAILY IN THE EVENING        Last Filled:  prozac 12/24/2022, #30 with one refill     All other meds 10/12/2022, #30 with 2 refills    Patient Phone Number: [phone] (home)     Last appt: 10/6/2022   Next appt: Visit date not found    Last OARRS: No flowsheet data found.

## 2023-02-22 DIAGNOSIS — F32.A DEPRESSION, UNSPECIFIED DEPRESSION TYPE: ICD-10-CM

## 2023-02-22 NOTE — TELEPHONE ENCOUNTER
----- Message from Leigh Duggan sent at 2/22/2023  1:50 PM EST -----  Subject: Refill Request    QUESTIONS  Name of Medication? amLODIPine (NORVASC) 5 MG tablet  Patient-reported dosage and instructions? 5mg tablet. Take one tablet by   mouth in the am   How many days do you have left? 0  [id number]  Pharmacy phone number (if available)? [phone]  ---------------------------------------------------------------------------  --------------,  Name of Medication? metoprolol succinate (TOPROL XL) 50 MG extended   release tablet  Patient-reported dosage and instructions? 50 mg. Tablet extended release,   take one by mouth QD   How many days do you have left? 0  [facility] [id number]  Pharmacy phone number (if available)? [phone]  ---------------------------------------------------------------------------  --------------,  Name of Medication? clopidogrel (PLAVIX) 75 MG tablet  Patient-reported dosage and instructions? 75mg tablet. Take one tablet by   mouth QD   How many days do you have left? 0  [facility] [id number]  Pharmacy phone number (if available)? [phone]  ---------------------------------------------------------------------------  --------------,  Name of Medication? FLUoxetine (PROZAC) 40 MG capsule  Patient-reported dosage and instructions? 40mg capsule. Take one capsule   by mouth QD   How many days do you have left? 0  [facility] [id number]  Pharmacy phone number (if available)? [phone]  ---------------------------------------------------------------------------  --------------,  Name of Medication? lisinopril (PRINIVIL;ZESTRIL) 5 MG tablet  Patient-reported dosage and instructions? 5mg tablet, Take one tablet by   mouth QD   How many days do you have left? 0  [facility] [id number]  Pharmacy phone number (if available)? [phone]  ---------------------------------------------------------------------------  --------------  Nisha MATOS  What is the best way for the office to contact you? OK to leave message on   voicemail  Preferred Call Back Phone Number? [phone]  ---------------------------------------------------------------------------  --------------  SCRIPT ANSWERS  Relationship to Patient?  Self

## 2023-02-22 NOTE — TELEPHONE ENCOUNTER
Medication:   Requested Prescriptions     Pending Prescriptions Disp Refills    amLODIPine (NORVASC) 5 MG tablet 30 tablet 1     Sig: Take 1 tablet by mouth daily    FLUoxetine (PROZAC) 40 MG capsule 30 capsule 1     Sig: Take 1 capsule by mouth daily    clopidogrel (PLAVIX) 75 MG tablet 30 tablet 1     Sig: Take 1 tablet by mouth daily    metoprolol succinate (TOPROL XL) 50 MG extended release tablet 30 tablet 2     Sig: Take 1 tablet by mouth daily    lisinopril (PRINIVIL;ZESTRIL) 5 MG tablet 30 tablet 1     Sig: Take 1 tablet by mouth daily        Last Filled:  10/12/2022, #30 with 2 refills - metoprolol  2/2/2023, #30 with 1 refill - all other meds      Patient Phone Number: [phone] (home)     Last appt: 10/6/2022   Next appt: 2/23/2023    Last OARRS: No flowsheet data found.

## 2023-02-23 RX ORDER — METOPROLOL SUCCINATE 50 MG/1
50 TABLET, EXTENDED RELEASE ORAL DAILY
Qty: 30 TABLET | Refills: 2 | Status: SHIPPED | OUTPATIENT
Start: 2023-02-23

## 2023-02-23 RX ORDER — FLUOXETINE HYDROCHLORIDE 40 MG/1
40 CAPSULE ORAL DAILY
Qty: 30 CAPSULE | Refills: 2 | Status: SHIPPED | OUTPATIENT
Start: 2023-02-23

## 2023-02-23 RX ORDER — LISINOPRIL 5 MG/1
5 TABLET ORAL DAILY
Qty: 30 TABLET | Refills: 2 | Status: SHIPPED | OUTPATIENT
Start: 2023-02-23

## 2023-02-23 RX ORDER — AMLODIPINE BESYLATE 5 MG/1
5 TABLET ORAL DAILY
Qty: 30 TABLET | Refills: 2 | Status: SHIPPED | OUTPATIENT
Start: 2023-02-23

## 2023-02-23 RX ORDER — CLOPIDOGREL BISULFATE 75 MG/1
75 TABLET ORAL DAILY
Qty: 30 TABLET | Refills: 2 | Status: SHIPPED | OUTPATIENT
Start: 2023-02-23

## 2023-02-27 ENCOUNTER — OFFICE VISIT (OUTPATIENT)
Dept: PRIMARY CARE CLINIC | Age: 58
End: 2023-02-27

## 2023-02-27 VITALS
OXYGEN SATURATION: 97 % | SYSTOLIC BLOOD PRESSURE: 118 MMHG | RESPIRATION RATE: 16 BRPM | HEIGHT: 62 IN | WEIGHT: 160 LBS | HEART RATE: 90 BPM | TEMPERATURE: 98 F | BODY MASS INDEX: 29.44 KG/M2 | DIASTOLIC BLOOD PRESSURE: 80 MMHG

## 2023-02-27 DIAGNOSIS — F32.A DEPRESSION, UNSPECIFIED DEPRESSION TYPE: ICD-10-CM

## 2023-02-27 DIAGNOSIS — E10.69 TYPE 1 DIABETES MELLITUS WITH OTHER SPECIFIED COMPLICATION (HCC): Primary | ICD-10-CM

## 2023-02-27 DIAGNOSIS — I25.10 CORONARY ARTERY DISEASE INVOLVING NATIVE CORONARY ARTERY OF NATIVE HEART WITHOUT ANGINA PECTORIS: ICD-10-CM

## 2023-02-27 DIAGNOSIS — E10.319 DIABETIC RETINOPATHY OF BOTH EYES ASSOCIATED WITH TYPE 1 DIABETES MELLITUS, MACULAR EDEMA PRESENCE UNSPECIFIED, UNSPECIFIED RETINOPATHY SEVERITY (HCC): ICD-10-CM

## 2023-02-27 DIAGNOSIS — E10.69 TYPE 1 DIABETES MELLITUS WITH OTHER SPECIFIED COMPLICATION (HCC): ICD-10-CM

## 2023-02-27 LAB — HBA1C MFR BLD: 11.4 %

## 2023-02-27 SDOH — ECONOMIC STABILITY: FOOD INSECURITY: WITHIN THE PAST 12 MONTHS, YOU WORRIED THAT YOUR FOOD WOULD RUN OUT BEFORE YOU GOT MONEY TO BUY MORE.: NEVER TRUE

## 2023-02-27 SDOH — ECONOMIC STABILITY: HOUSING INSECURITY
IN THE LAST 12 MONTHS, WAS THERE A TIME WHEN YOU DID NOT HAVE A STEADY PLACE TO SLEEP OR SLEPT IN A SHELTER (INCLUDING NOW)?: NO

## 2023-02-27 SDOH — ECONOMIC STABILITY: FOOD INSECURITY: WITHIN THE PAST 12 MONTHS, THE FOOD YOU BOUGHT JUST DIDN'T LAST AND YOU DIDN'T HAVE MONEY TO GET MORE.: NEVER TRUE

## 2023-02-27 SDOH — ECONOMIC STABILITY: INCOME INSECURITY: HOW HARD IS IT FOR YOU TO PAY FOR THE VERY BASICS LIKE FOOD, HOUSING, MEDICAL CARE, AND HEATING?: NOT HARD AT ALL

## 2023-02-27 ASSESSMENT — PATIENT HEALTH QUESTIONNAIRE - PHQ9
6. FEELING BAD ABOUT YOURSELF - OR THAT YOU ARE A FAILURE OR HAVE LET YOURSELF OR YOUR FAMILY DOWN: 0
3. TROUBLE FALLING OR STAYING ASLEEP: 0
8. MOVING OR SPEAKING SO SLOWLY THAT OTHER PEOPLE COULD HAVE NOTICED. OR THE OPPOSITE, BEING SO FIGETY OR RESTLESS THAT YOU HAVE BEEN MOVING AROUND A LOT MORE THAN USUAL: 0
SUM OF ALL RESPONSES TO PHQ QUESTIONS 1-9: 1
SUM OF ALL RESPONSES TO PHQ QUESTIONS 1-9: 1
9. THOUGHTS THAT YOU WOULD BE BETTER OFF DEAD, OR OF HURTING YOURSELF: 0
10. IF YOU CHECKED OFF ANY PROBLEMS, HOW DIFFICULT HAVE THESE PROBLEMS MADE IT FOR YOU TO DO YOUR WORK, TAKE CARE OF THINGS AT HOME, OR GET ALONG WITH OTHER PEOPLE: 0
1. LITTLE INTEREST OR PLEASURE IN DOING THINGS: 0
2. FEELING DOWN, DEPRESSED OR HOPELESS: 0
SUM OF ALL RESPONSES TO PHQ9 QUESTIONS 1 & 2: 0
4. FEELING TIRED OR HAVING LITTLE ENERGY: 0
SUM OF ALL RESPONSES TO PHQ QUESTIONS 1-9: 1
7. TROUBLE CONCENTRATING ON THINGS, SUCH AS READING THE NEWSPAPER OR WATCHING TELEVISION: 1
5. POOR APPETITE OR OVEREATING: 0
SUM OF ALL RESPONSES TO PHQ QUESTIONS 1-9: 1

## 2023-02-27 ASSESSMENT — ENCOUNTER SYMPTOMS
NAUSEA: 0
COUGH: 0
VOMITING: 0
BLOOD IN STOOL: 0
SHORTNESS OF BREATH: 0
SORE THROAT: 0
ABDOMINAL PAIN: 0

## 2023-02-27 ASSESSMENT — ANXIETY QUESTIONNAIRES
5. BEING SO RESTLESS THAT IT IS HARD TO SIT STILL: 0
3. WORRYING TOO MUCH ABOUT DIFFERENT THINGS: 0
IF YOU CHECKED OFF ANY PROBLEMS ON THIS QUESTIONNAIRE, HOW DIFFICULT HAVE THESE PROBLEMS MADE IT FOR YOU TO DO YOUR WORK, TAKE CARE OF THINGS AT HOME, OR GET ALONG WITH OTHER PEOPLE: NOT DIFFICULT AT ALL
1. FEELING NERVOUS, ANXIOUS, OR ON EDGE: 1
GAD7 TOTAL SCORE: 3
4. TROUBLE RELAXING: 1
6. BECOMING EASILY ANNOYED OR IRRITABLE: 1
7. FEELING AFRAID AS IF SOMETHING AWFUL MIGHT HAPPEN: 0
2. NOT BEING ABLE TO STOP OR CONTROL WORRYING: 0

## 2023-02-27 NOTE — TELEPHONE ENCOUNTER
Medication:   Requested Prescriptions     Pending Prescriptions Disp Refills    blood glucose test strips (ASCENSIA AUTODISC VI;ONE TOUCH ULTRA TEST VI) strip 100 each 3     Si each by In Vitro route daily Test as directed,Dispense according to insurance formulary - OneTouch        Last Filled:  2022 #100 with 3 refills     Patient Phone Number: [phone] (home)     Last appt: 2023   Next appt: 2023    Last OARRS: No flowsheet data found.

## 2023-02-27 NOTE — PROGRESS NOTES
Chief Complaint   Patient presents with    Follow-up     Pt is here for a diabetes f/u     Medication Refill         Gaetano Goodpasture is a 62 y.o. female who presents  who presents for routine visit regarding diabetes, CAD and depression. Patient has been noncompliant with followup office visits. Pt did not see Ophthalmology or Neurology as directed      Pt was born extremely premature at 6 months    Patient did recent bloodwork which was within normal limits except for signs of uncontrolled diabetes with an elevated glucose 308 and HgBA1c level 10.1 and patient thus needs to increase her lantus insulin dosage to 30 units daily and her novolog insulin dosage to 12 units TID before meals for better control --- Pt was afraid to change her insulin dosages secondary to concerns about hypoglycemia     Pt has a hx of type 1 diabetes Dx at 10 y/o and is on lantus insulin 24 units daily along with a novolog sliding scale     Pt has a hx of diabetic retinopathy and used to be followed by Opthalmology      Pt is on prozac daily for depression with good control     Surgical hx includes appendectomy and hernia repair     Pt did complete her COVID vaccine series     Patient has a past medical history significant for CAD s/p stent placement x 4 most recently last year and is followed by Cardiology and is on crestor, toprol xl, norvasc, Baby ASA and plavix medications     Pt is an exsmoker Quit 2002 and used to abuse alcohol but now only drinks occasionally; Pt is a retired correctional counselor     FHx positive for early CAD      Review of Systems   Constitutional:  Negative for fatigue and fever. HENT:  Negative for congestion, nosebleeds and sore throat. Eyes:         Positive blurred vision at times; Negative diplopia   Respiratory:  Negative for cough and shortness of breath. Cardiovascular:  Negative for chest pain, palpitations and leg swelling.    Gastrointestinal:  Negative for abdominal pain, blood in stool, nausea and vomiting. Negative melena or indigestion   Endocrine: Negative for polydipsia and polyuria. Genitourinary:  Negative for dysuria and hematuria. Musculoskeletal:  Negative for arthralgias. Skin:  Negative for rash. Neurological:  Negative for dizziness, seizures, syncope, speech difficulty, weakness and headaches. Psychiatric/Behavioral:  Negative for dysphoric mood. The patient is not nervous/anxious. Positive short term memory issues but has improved since last office visit       Vitals:    02/27/23 1036   BP: 118/80   Pulse: 90   Resp: 16   Temp: 98 °F (36.7 °C)   SpO2: 97%     HgBA1c = 11.4    Physical Exam  Vitals reviewed. Constitutional:       General: She is not in acute distress. HENT:      Mouth/Throat:      Mouth: Mucous membranes are moist.      Pharynx: Oropharynx is clear. Eyes:      General: No scleral icterus. Comments: Pink conjunctivae    Neck:      Thyroid: No thyromegaly. Comments: No carotid bruits noted B/L  Cardiovascular:      Heart sounds: Normal heart sounds. No murmur heard. No friction rub. No gallop. Pulmonary:      Effort: Pulmonary effort is normal.      Breath sounds: Normal breath sounds. Abdominal:      Palpations: Abdomen is soft. Tenderness: There is no abdominal tenderness. Musculoskeletal:      Comments: No leg edema noted B/L   Lymphadenopathy:      Cervical: No cervical adenopathy. Skin:     Findings: No rash. Neurological:      Mental Status: She is alert. Comments: Cranial nerves 2 - 12 grossly intact; Muscle strength 5/5 throughout   Psychiatric:         Mood and Affect: Mood normal.       ASSESSMENT AND PLAN    1.  Type 1 diabetes mellitus with other specified complication ([address]  Patient uncontrolled with a HgBA1c level 11.4 and was told to follow a stricter diabetic diet; avoiding concentrated sweets and consuming a low carbohydrate diet and will Refer pt to Endocrinology for evaluation  - POCT glycosylated hemoglobin (Hb A1C)  - Caesar King MD, Endocrinology, [facility]    2. Depression, unspecified depression type  Patient clinically stable on present medications     3. Coronary artery disease involving native coronary artery of native heart without angina pectoris  Pt is followed by Cardiology and is clinically stable on present medications and denies any CP or SOB     4. Diabetic retinopathy of both eyes associated with type 1 diabetes mellitus, macular edema presence unspecified, unspecified retinopathy severity ([address]  Patient reports blurred vision at times and needs to see Ophthalmology as directed! - Caesar King MD, Endocrinology, [facility]      Suzi Leiva MD      Return in about 3 months (around 5/27/2023). Patient Instructions     Go to the ER ASAP if you develop any chest pain, shortness of breath, facial droop, slurred speech, weakness/numbness in your arms or legs or double vision!

## 2023-03-24 DIAGNOSIS — E10.69 TYPE 1 DIABETES MELLITUS WITH OTHER SPECIFIED COMPLICATION (HCC): ICD-10-CM

## 2023-03-24 RX ORDER — INSULIN GLARGINE 100 [IU]/ML
INJECTION, SOLUTION SUBCUTANEOUS
Qty: 15 ML | Refills: 2 | Status: SHIPPED | OUTPATIENT
Start: 2023-03-24

## 2023-03-24 NOTE — TELEPHONE ENCOUNTER
Medication:   Requested Prescriptions     Pending Prescriptions Disp Refills    LANTUS SOLOSTAR 100 UNIT/ML injection pen [Pharmacy Med Name: Lantus SoloStar 100 UNIT/ML Subcutaneous Solution Pen-injector] 15 mL 0     Sig: INJECT 24 UNITS SUBCUTANEOUSLY NIGHTLY       Last Filled:  10/6/2022, #3 pens with 3 refills    Patient Phone Number: [phone] (home)     Last appt: 2/27/2023   Next appt: 5/30/2023    Last Labs DM:   Lab Results   Component Value Date/Time    LABA1C 11.4 02/27/2023 11:43 AM

## 2023-05-11 DIAGNOSIS — E10.69 TYPE 1 DIABETES MELLITUS WITH OTHER SPECIFIED COMPLICATION (HCC): ICD-10-CM

## 2023-05-12 RX ORDER — BLOOD SUGAR DIAGNOSTIC
50 STRIP MISCELLANEOUS DAILY
Qty: 50 EACH | Refills: 3 | Status: SHIPPED | OUTPATIENT
Start: 2023-05-12

## 2023-05-30 RX ORDER — INSULIN ASPART 100 [IU]/ML
INJECTION, SOLUTION INTRAVENOUS; SUBCUTANEOUS
Qty: 10 ML | Refills: 0 | Status: SHIPPED | OUTPATIENT
Start: 2023-05-30

## 2023-05-30 NOTE — TELEPHONE ENCOUNTER
Medication:   Requested Prescriptions     Pending Prescriptions Disp Refills    insulin aspart (NOVOLOG) 100 UNIT/ML injection vial [Pharmacy Med Name: Insulin Aspart 100 UNIT/ML Subcutaneous Solution] 10 mL 0     Sig: INJECT 8 UNITS THREE TIMES DAILY BEFORE MEAL(S)       Last Filled: no information available      Patient Phone Number: [phone] (home)     Last appt: 2/27/2023   Next appt: 6/6/2023    Last Labs DM:   Lab Results   Component Value Date/Time    LABA1C 11.4 02/27/2023 11:43 AM

## 2023-06-06 ENCOUNTER — OFFICE VISIT (OUTPATIENT)
Dept: PRIMARY CARE CLINIC | Age: 58
End: 2023-06-06

## 2023-06-06 VITALS
BODY MASS INDEX: 28.35 KG/M2 | OXYGEN SATURATION: 98 % | TEMPERATURE: 96.8 F | HEART RATE: 78 BPM | WEIGHT: 155 LBS | DIASTOLIC BLOOD PRESSURE: 68 MMHG | SYSTOLIC BLOOD PRESSURE: 140 MMHG

## 2023-06-06 DIAGNOSIS — I25.10 CORONARY ARTERY DISEASE INVOLVING NATIVE CORONARY ARTERY OF NATIVE HEART WITHOUT ANGINA PECTORIS: Primary | ICD-10-CM

## 2023-06-06 DIAGNOSIS — E10.69 TYPE 1 DIABETES MELLITUS WITH OTHER SPECIFIED COMPLICATION (HCC): ICD-10-CM

## 2023-06-06 DIAGNOSIS — F32.A DEPRESSION, UNSPECIFIED DEPRESSION TYPE: ICD-10-CM

## 2023-06-06 PROCEDURE — 99213 OFFICE O/P EST LOW 20 MIN: CPT | Performed by: FAMILY MEDICINE

## 2023-06-06 PROCEDURE — 3074F SYST BP LT 130 MM HG: CPT | Performed by: FAMILY MEDICINE

## 2023-06-06 PROCEDURE — 3046F HEMOGLOBIN A1C LEVEL >9.0%: CPT | Performed by: FAMILY MEDICINE

## 2023-06-06 PROCEDURE — 3078F DIAST BP <80 MM HG: CPT | Performed by: FAMILY MEDICINE

## 2023-06-06 RX ORDER — LISINOPRIL 5 MG/1
5 TABLET ORAL DAILY
Qty: 30 TABLET | Refills: 2 | Status: SHIPPED | OUTPATIENT
Start: 2023-06-06

## 2023-06-06 RX ORDER — FLUOXETINE HYDROCHLORIDE 40 MG/1
40 CAPSULE ORAL DAILY
Qty: 30 CAPSULE | Refills: 2 | Status: SHIPPED | OUTPATIENT
Start: 2023-06-06

## 2023-06-06 RX ORDER — INSULIN ASPART 100 [IU]/ML
INJECTION, SOLUTION INTRAVENOUS; SUBCUTANEOUS
Qty: 10 ML | Refills: 2 | Status: SHIPPED | OUTPATIENT
Start: 2023-06-06

## 2023-06-06 RX ORDER — CLOPIDOGREL BISULFATE 75 MG/1
75 TABLET ORAL DAILY
Qty: 30 TABLET | Refills: 2 | Status: SHIPPED | OUTPATIENT
Start: 2023-06-06

## 2023-06-06 RX ORDER — ROSUVASTATIN CALCIUM 40 MG/1
TABLET, COATED ORAL
Qty: 30 TABLET | Refills: 2 | Status: SHIPPED | OUTPATIENT
Start: 2023-06-06

## 2023-06-06 RX ORDER — AMLODIPINE BESYLATE 5 MG/1
5 TABLET ORAL DAILY
Qty: 30 TABLET | Refills: 2 | Status: SHIPPED | OUTPATIENT
Start: 2023-06-06

## 2023-06-06 RX ORDER — INSULIN GLARGINE 100 [IU]/ML
INJECTION, SOLUTION SUBCUTANEOUS
Qty: 15 ML | Refills: 2 | Status: SHIPPED | OUTPATIENT
Start: 2023-06-06

## 2023-06-06 RX ORDER — METOPROLOL SUCCINATE 50 MG/1
50 TABLET, EXTENDED RELEASE ORAL DAILY
Qty: 30 TABLET | Refills: 2 | Status: SHIPPED | OUTPATIENT
Start: 2023-06-06

## 2023-06-06 ASSESSMENT — ENCOUNTER SYMPTOMS
BLOOD IN STOOL: 0
VOMITING: 0
SHORTNESS OF BREATH: 0
COUGH: 0
SORE THROAT: 0
ABDOMINAL PAIN: 0
NAUSEA: 0

## 2023-06-06 NOTE — PROGRESS NOTES
Neurological:  Negative for dizziness, seizures, syncope, speech difficulty, weakness and headaches. Psychiatric/Behavioral:  Negative for dysphoric mood. The patient is not nervous/anxious. Vitals:    06/06/23 1359   BP: (!) 140/68   Pulse: 78   Temp: 96.8 °F (36 °C)   SpO2: 98%         Physical Exam  Vitals reviewed. Constitutional:       General: She is not in acute distress. HENT:      Mouth/Throat:      Mouth: Mucous membranes are moist.      Pharynx: Oropharynx is clear. Eyes:      General: No scleral icterus. Comments: Pink conjunctivae    Neck:      Thyroid: No thyromegaly. Comments: No carotid bruits noted B/L  Cardiovascular:      Heart sounds: Normal heart sounds. No murmur heard. No friction rub. No gallop. Pulmonary:      Effort: Pulmonary effort is normal.      Breath sounds: Normal breath sounds. Abdominal:      Palpations: Abdomen is soft. Tenderness: There is no abdominal tenderness. Musculoskeletal:      Comments: No leg edema noted B/L   Lymphadenopathy:      Cervical: No cervical adenopathy. Skin:     Findings: No rash. Neurological:      Mental Status: She is alert. Comments: Cranial nerves 2 - 12 grossly intact; Muscle strength 5/5 throughout   Psychiatric:         Mood and Affect: Mood normal.       ASSESSMENT AND PLAN    1. Depression, unspecified depression type  Patient clinically stable on present medications  - FLUoxetine (PROZAC) 40 MG capsule; Take 1 capsule by mouth daily  Dispense: 30 capsule; Refill: 2    2. Coronary artery disease involving native coronary artery of native heart without angina pectoris  Pt is followed by Cardiology and is clinically stable on present medications and denies any CP or SOB   - amLODIPine (NORVASC) 5 MG tablet; Take 1 tablet by mouth daily  Dispense: 30 tablet; Refill: 2  - clopidogrel (PLAVIX) 75 MG tablet; Take 1 tablet by mouth daily  Dispense: 30 tablet;  Refill: 2  - metoprolol succinate (TOPROL XL) 50

## 2023-06-29 DIAGNOSIS — E10.69 TYPE 1 DIABETES MELLITUS WITH OTHER SPECIFIED COMPLICATION (HCC): Primary | ICD-10-CM

## 2023-06-30 DIAGNOSIS — F32.A DEPRESSION, UNSPECIFIED DEPRESSION TYPE: ICD-10-CM

## 2023-06-30 RX ORDER — FLUOXETINE HYDROCHLORIDE 40 MG/1
40 CAPSULE ORAL DAILY
Qty: 30 CAPSULE | Refills: 2 | OUTPATIENT
Start: 2023-06-30

## 2023-06-30 NOTE — TELEPHONE ENCOUNTER
Medication:   Requested Prescriptions     Pending Prescriptions Disp Refills    FLUoxetine (PROZAC) 40 MG capsule [Pharmacy Med Name: FLUoxetine HCL 40 MG CAPSULE] 30 capsule 2     Sig: TAKE 1 CAPSULE BY MOUTH DAILY        Last Filled: 06/06/2023 #30 with 2 refills     Patient Phone Number: [phone] (home)     Last appt: 6/6/2023   Next appt: 10/9/2023    Last OARRS: No flowsheet data found.

## 2023-07-03 RX ORDER — INSULIN GLARGINE 100 [IU]/ML
24 INJECTION, SOLUTION SUBCUTANEOUS NIGHTLY
Qty: 2 EACH | Refills: 3 | Status: CANCELLED | OUTPATIENT
Start: 2023-07-03

## 2023-07-03 NOTE — TELEPHONE ENCOUNTER
Spoke to patient, notified her for the cost of the lantus vial. She said she will just get one pen for now. No further action is required for this encounter.

## 2023-07-03 NOTE — TELEPHONE ENCOUNTER
Please let pt know the vial will still be $117.00 per vial  Does pt have insurance coverage?   Please advise

## 2023-07-17 ENCOUNTER — TELEPHONE (OUTPATIENT)
Dept: PRIMARY CARE CLINIC | Age: 58
End: 2023-07-17

## 2023-07-17 NOTE — TELEPHONE ENCOUNTER
Patient requesting a medication refill.     Medication lantus solostar   Dosage  100 unitl/ml injection pen   Frequency  Pharmacy [address]   Next office visit 10/9/23

## 2023-07-17 NOTE — TELEPHONE ENCOUNTER
Patient stated she has come into some money and would like to get her original prescription, Patient was advised to speak with pharmacist about filling solostar RX  No further action is required for this encounter.

## 2023-08-03 LAB
BUN BLDV-MCNC: 18 MG/DL
CALCIUM SERPL-MCNC: 8.9 MG/DL
CHLORIDE BLD-SCNC: 105 MMOL/L
CO2: 23 MMOL/L
CREAT SERPL-MCNC: 0.89 MG/DL
EGFR: 75
GLUCOSE BLD-MCNC: 319 MG/DL
POTASSIUM SERPL-SCNC: 4.4 MMOL/L
SODIUM BLD-SCNC: 136 MMOL/L

## 2023-08-23 RX ORDER — INSULIN LISPRO 100 [IU]/ML
INJECTION, SOLUTION INTRAVENOUS; SUBCUTANEOUS
Qty: 10 ML | Refills: 2 | Status: SHIPPED
Start: 2023-08-23 | End: 2023-10-17

## 2023-08-23 NOTE — TELEPHONE ENCOUNTER
Medication:   Requested Prescriptions     Pending Prescriptions Disp Refills    insulin lispro (HUMALOG) 100 UNIT/ML SOLN injection vial [Pharmacy Med Name: INSULIN LISPRO 100 UNIT/ML VL] 10 mL      Sig: INJECT 8 UNITS UNDER THE SKIN THREE TIMES A DAY BEFORE MEALS       Last Filled: 09/20/2021 #10 mL with 0 refills     Patient Phone Number: [phone] (home)     Last appt: 6/6/2023   Next appt: 10/9/2023    Last Labs DM:   Lab Results   Component Value Date/Time    LABA1C 11.4 02/27/2023 11:43 AM

## 2023-09-05 DIAGNOSIS — F32.A DEPRESSION, UNSPECIFIED DEPRESSION TYPE: ICD-10-CM

## 2023-09-05 NOTE — TELEPHONE ENCOUNTER
Medication:   Requested Prescriptions     Pending Prescriptions Disp Refills    FLUoxetine (PROZAC) 40 MG capsule [Pharmacy Med Name: FLUOXETINE HCL 40 MG CAPSULE] 30 capsule 2     Sig: TAKE 1 CAPSULE BY MOUTH DAILY        Last Filled: 06/06/2023 #30 with 2 refills     Patient Phone Number: [phone] (home)     Last appt: 6/6/2023   Next appt: 10/9/2023    Last OARRS: No flowsheet data found.

## 2023-09-06 RX ORDER — FLUOXETINE HYDROCHLORIDE 40 MG/1
40 CAPSULE ORAL DAILY
Qty: 30 CAPSULE | Refills: 2 | Status: SHIPPED | OUTPATIENT
Start: 2023-09-06 | End: 2023-10-05 | Stop reason: SDUPTHER

## 2023-10-05 DIAGNOSIS — E10.69 TYPE 1 DIABETES MELLITUS WITH OTHER SPECIFIED COMPLICATION (HCC): ICD-10-CM

## 2023-10-05 DIAGNOSIS — F32.A DEPRESSION, UNSPECIFIED DEPRESSION TYPE: ICD-10-CM

## 2023-10-05 DIAGNOSIS — I25.10 CORONARY ARTERY DISEASE INVOLVING NATIVE CORONARY ARTERY OF NATIVE HEART WITHOUT ANGINA PECTORIS: ICD-10-CM

## 2023-10-05 RX ORDER — METOPROLOL SUCCINATE 50 MG/1
50 TABLET, EXTENDED RELEASE ORAL DAILY
Qty: 30 TABLET | Refills: 1 | Status: SHIPPED | OUTPATIENT
Start: 2023-10-05

## 2023-10-05 RX ORDER — INSULIN GLARGINE 100 [IU]/ML
INJECTION, SOLUTION SUBCUTANEOUS
Qty: 15 ML | Refills: 2 | Status: SHIPPED | OUTPATIENT
Start: 2023-10-05

## 2023-10-05 RX ORDER — ASPIRIN 81 MG/1
81 TABLET ORAL DAILY
Qty: 30 TABLET | Refills: 1 | Status: SHIPPED | OUTPATIENT
Start: 2023-10-05

## 2023-10-05 RX ORDER — AMLODIPINE BESYLATE 5 MG/1
5 TABLET ORAL DAILY
Qty: 30 TABLET | Refills: 1 | Status: SHIPPED | OUTPATIENT
Start: 2023-10-05

## 2023-10-05 RX ORDER — CLOPIDOGREL BISULFATE 75 MG/1
75 TABLET ORAL DAILY
Qty: 30 TABLET | Refills: 1 | Status: SHIPPED | OUTPATIENT
Start: 2023-10-05

## 2023-10-05 RX ORDER — FLUOXETINE HYDROCHLORIDE 40 MG/1
40 CAPSULE ORAL DAILY
Qty: 30 CAPSULE | Refills: 1 | Status: SHIPPED | OUTPATIENT
Start: 2023-10-05

## 2023-10-05 RX ORDER — ROSUVASTATIN CALCIUM 40 MG/1
TABLET, COATED ORAL
Qty: 30 TABLET | Refills: 1 | Status: SHIPPED | OUTPATIENT
Start: 2023-10-05

## 2023-10-05 RX ORDER — LISINOPRIL 5 MG/1
5 TABLET ORAL DAILY
Qty: 30 TABLET | Refills: 1 | Status: SHIPPED | OUTPATIENT
Start: 2023-10-05

## 2023-10-05 NOTE — TELEPHONE ENCOUNTER
Pt needs a refill of these medication to hold her off till her next appt         Patient requesting a medication refill. Medication Lisinopril  Dosage  5 mg tab   Frequency Daily   Pharmacy [facility] [address]  Next office visit 10/17/23    Patient requesting a medication refill. Medication Prozac  Dosage  40 mg cap  Frequency Daily   Pharmacy [facility]  Next office visit 10/17/23    Patient requesting a medication refill. Medication Aspirin   Dosage  81 mg tab   Frequency Daily   Pharmacy [facility]  Next office visit 10/17/23    Patient requesting a medication refill. Medication   Dosage   Frequency  Pharmacy [facility]  Next office visit    Patient requesting a medication refill. Medication Plavix   Dosage 75 mg tab   Frequency Daily   Pharmacy [facility]  Next office visit 10/17/23    Patient requesting a medication refill. Medication Rosuvastatin   Dosage  40 mg tab   Frequency  Pharmacy [facility]  Next office visit 10/17/23    Patient requesting a medication refill. Medication Amlodipine   Dosage 5 mg tab   Frequency Daily   Pharmacy [facility] [address]  Next office visit 10/17/23    Patient requesting a medication refill. Medication insulin glargine   Dosage  100 Unit/ML injection pen   Frequency  Pharmacy [facility]  Next office visit 10/17/23    Patient requesting a medication refill.     Medication Toprol XL  Dosage  50 mg extended release tab   Frequency Daily   Pharmacy [facility]  Next office visit 10/17/23

## 2023-10-05 NOTE — TELEPHONE ENCOUNTER
Medication:   Requested Prescriptions     Pending Prescriptions Disp Refills    lisinopril (PRINIVIL;ZESTRIL) 5 MG tablet 30 tablet 0     Sig: Take 1 tablet by mouth daily    FLUoxetine (PROZAC) 40 MG capsule 30 capsule 0     Sig: Take 1 capsule by mouth daily    clopidogrel (PLAVIX) 75 MG tablet 30 tablet 0     Sig: Take 1 tablet by mouth daily    rosuvastatin (CRESTOR) 40 MG tablet 30 tablet 0     Sig: TAKE 1 TABLET BY MOUTH ONCE DAILY IN THE EVENING    amLODIPine (NORVASC) 5 MG tablet 30 tablet 0     Sig: Take 1 tablet by mouth daily    insulin glargine (LANTUS SOLOSTAR) 100 UNIT/ML injection pen 15 mL 2     Sig: INJECT 24 UNITS SUBCUTANEOUSLY NIGHTLY    metoprolol succinate (TOPROL XL) 50 MG extended release tablet 30 tablet 0     Sig: Take 1 tablet by mouth daily    aspirin 81 MG EC tablet 30 tablet 0     Sig: Take 1 tablet by mouth daily        Last Filled: lisinopril-06/06/2023 #30 with 2 refills   Prozac-09/06/2023 #30 with 2 refills-refills remaining   Plavix- 06/06/2023 #30 with 2 refills   Crestor- 06/06/2023 #30 with 2 refills   Amlodipine- 06/06/2023 #30 with 2 refills   Lantus- 06/06/2023 #15 mL with 2 refills   Toprol- 06/06/2023 #30 with 2 refills   Aspirin- historical provider     Patient Phone Number: [phone] (home)     Last appt: 6/6/2023   Next appt: 10/17/2023    Last OARRS:        No data to display

## 2023-10-17 ENCOUNTER — OFFICE VISIT (OUTPATIENT)
Dept: PRIMARY CARE CLINIC | Age: 58
End: 2023-10-17
Payer: MEDICAID

## 2023-10-17 VITALS
TEMPERATURE: 96.5 F | BODY MASS INDEX: 29.08 KG/M2 | RESPIRATION RATE: 16 BRPM | SYSTOLIC BLOOD PRESSURE: 126 MMHG | DIASTOLIC BLOOD PRESSURE: 72 MMHG | HEART RATE: 64 BPM | OXYGEN SATURATION: 99 % | HEIGHT: 62 IN | WEIGHT: 158 LBS

## 2023-10-17 DIAGNOSIS — F32.A DEPRESSION, UNSPECIFIED DEPRESSION TYPE: ICD-10-CM

## 2023-10-17 DIAGNOSIS — I25.10 CORONARY ARTERY DISEASE INVOLVING NATIVE CORONARY ARTERY OF NATIVE HEART WITHOUT ANGINA PECTORIS: ICD-10-CM

## 2023-10-17 DIAGNOSIS — E10.69 TYPE 1 DIABETES MELLITUS WITH OTHER SPECIFIED COMPLICATION (HCC): Primary | ICD-10-CM

## 2023-10-17 LAB
CHP ED QC CHECK: NORMAL
GLUCOSE BLD-MCNC: 74 MG/DL
HBA1C MFR BLD: 8.8 %

## 2023-10-17 PROCEDURE — 83036 HEMOGLOBIN GLYCOSYLATED A1C: CPT | Performed by: FAMILY MEDICINE

## 2023-10-17 PROCEDURE — 3074F SYST BP LT 130 MM HG: CPT | Performed by: FAMILY MEDICINE

## 2023-10-17 PROCEDURE — 3078F DIAST BP <80 MM HG: CPT | Performed by: FAMILY MEDICINE

## 2023-10-17 PROCEDURE — 82962 GLUCOSE BLOOD TEST: CPT | Performed by: FAMILY MEDICINE

## 2023-10-17 PROCEDURE — 99214 OFFICE O/P EST MOD 30 MIN: CPT | Performed by: FAMILY MEDICINE

## 2023-10-17 PROCEDURE — 3052F HG A1C>EQUAL 8.0%<EQUAL 9.0%: CPT | Performed by: FAMILY MEDICINE

## 2023-10-17 ASSESSMENT — ENCOUNTER SYMPTOMS
COUGH: 0
ABDOMINAL PAIN: 0
BLOOD IN STOOL: 0
NAUSEA: 0
SHORTNESS OF BREATH: 0
VOMITING: 0
SORE THROAT: 0

## 2023-11-15 ENCOUNTER — COMMUNITY OUTREACH (OUTPATIENT)
Dept: PRIMARY CARE CLINIC | Age: 58
End: 2023-11-15

## 2023-11-15 NOTE — PROGRESS NOTES
Patient's HM shows they are overdue for Labs, Mammogram and Cervical Cancer Screening. Care Everywhere and  files searched. HM updated with BMP and Pap.

## 2024-02-19 DIAGNOSIS — F32.A DEPRESSION, UNSPECIFIED DEPRESSION TYPE: ICD-10-CM

## 2024-02-19 DIAGNOSIS — I25.10 CORONARY ARTERY DISEASE INVOLVING NATIVE CORONARY ARTERY OF NATIVE HEART WITHOUT ANGINA PECTORIS: ICD-10-CM

## 2024-02-20 RX ORDER — CLOPIDOGREL BISULFATE 75 MG/1
75 TABLET ORAL DAILY
Qty: 30 TABLET | Refills: 1 | OUTPATIENT
Start: 2024-02-20

## 2024-02-20 RX ORDER — METOPROLOL SUCCINATE 50 MG/1
50 TABLET, EXTENDED RELEASE ORAL DAILY
Qty: 30 TABLET | Refills: 1 | OUTPATIENT
Start: 2024-02-20

## 2024-02-20 RX ORDER — ROSUVASTATIN CALCIUM 40 MG/1
TABLET, COATED ORAL
Qty: 30 TABLET | Refills: 1 | OUTPATIENT
Start: 2024-02-20

## 2024-02-20 RX ORDER — FLUOXETINE HYDROCHLORIDE 40 MG/1
40 CAPSULE ORAL DAILY
Qty: 30 CAPSULE | Refills: 1 | OUTPATIENT
Start: 2024-02-20

## 2024-02-22 DIAGNOSIS — I25.10 CORONARY ARTERY DISEASE INVOLVING NATIVE CORONARY ARTERY OF NATIVE HEART WITHOUT ANGINA PECTORIS: ICD-10-CM

## 2024-02-23 DIAGNOSIS — I25.10 CORONARY ARTERY DISEASE INVOLVING NATIVE CORONARY ARTERY OF NATIVE HEART WITHOUT ANGINA PECTORIS: ICD-10-CM

## 2024-02-23 NOTE — TELEPHONE ENCOUNTER
Medication:   Requested Prescriptions     Pending Prescriptions Disp Refills    amLODIPine (NORVASC) 5 MG tablet 30 tablet 1     Sig: Take 1 tablet by mouth daily        Last Filled: 10/05/2023 #30 with 1 refill     Patient Phone Number: [phone] (home) [phone] (work)    Last appt: 10/17/2023   Next appt: 2/26/2024    Last OARRS:        No data to display

## 2024-02-23 NOTE — TELEPHONE ENCOUNTER
Medication:   Requested Prescriptions     Pending Prescriptions Disp Refills    rosuvastatin (CRESTOR) 40 MG tablet [Pharmacy Med Name: ROSUVASTATIN CALCIUM 40 MG TAB] 30 tablet 1     Sig: TAKE ONE TABLET BY MOUTH EVERY EVENING    metoprolol succinate (TOPROL XL) 50 MG extended release tablet [Pharmacy Med Name: METOPROLOL SUCC ER 50 MG TAB] 30 tablet 1     Sig: TAKE 1 TABLET BY MOUTH DAILY        Last Filled: 10/05/2023 #30 with 1 refill     Patient Phone Number: [phone] (home) [phone] (work)    Last appt: 10/17/2023   Next appt: 02/26/2024  Last OARRS:        No data to display

## 2024-02-24 RX ORDER — ROSUVASTATIN CALCIUM 40 MG/1
TABLET, COATED ORAL
Qty: 30 TABLET | Refills: 2 | Status: SHIPPED | OUTPATIENT
Start: 2024-02-24

## 2024-02-24 RX ORDER — AMLODIPINE BESYLATE 5 MG/1
5 TABLET ORAL DAILY
Qty: 30 TABLET | Refills: 2 | Status: SHIPPED | OUTPATIENT
Start: 2024-02-24

## 2024-02-24 RX ORDER — METOPROLOL SUCCINATE 50 MG/1
50 TABLET, EXTENDED RELEASE ORAL DAILY
Qty: 30 TABLET | Refills: 2 | Status: SHIPPED | OUTPATIENT
Start: 2024-02-24

## 2024-03-20 DIAGNOSIS — E10.69 TYPE 1 DIABETES MELLITUS WITH OTHER SPECIFIED COMPLICATION (HCC): ICD-10-CM

## 2024-03-21 DIAGNOSIS — I25.10 CORONARY ARTERY DISEASE INVOLVING NATIVE CORONARY ARTERY OF NATIVE HEART WITHOUT ANGINA PECTORIS: ICD-10-CM

## 2024-03-21 NOTE — TELEPHONE ENCOUNTER
Medication:   Requested Prescriptions     Pending Prescriptions Disp Refills    insulin aspart (NOVOLOG) 100 UNIT/ML injection vial [Pharmacy Med Name: INSULIN ASPART 100 UNIT/ML VIAL] 10 mL 2     Sig: INJECT 8 UNITS UNDER THE SKIN THREE TIMES A DAY BEFORE MEALS - DISCARD VIAL 28 DAYS AFTER OPENING        Last Filled:      Patient Phone Number: [phone] (home) [phone] (work)    Last appt: 10/17/2023   Next appt: Return in about 4 months (around 2/17/2024).     Last OARRS:        No data to display

## 2024-03-22 NOTE — TELEPHONE ENCOUNTER
Medication:   Requested Prescriptions     Pending Prescriptions Disp Refills    lisinopril (PRINIVIL;ZESTRIL) 5 MG tablet [Pharmacy Med Name: LISINOPRIL 5 MG TABLET] 30 tablet 1     Sig: TAKE 1 TABLET BY MOUTH DAILY        Last Filled: 10/05/2023 #30 with 1 refill     Patient Phone Number: [phone] (home) [phone] (work)    Last appt: 10/17/2023   Next appt: Visit date not found    Last OARRS:        No data to display

## 2024-03-22 NOTE — TELEPHONE ENCOUNTER
Tried to call patient x 2 , however the patients phone went straight to . Was unable to LVM due to patients VMB being full.

## 2024-03-26 RX ORDER — INSULIN ASPART 100 [IU]/ML
INJECTION, SOLUTION INTRAVENOUS; SUBCUTANEOUS
Qty: 10 ML | Refills: 2 | OUTPATIENT
Start: 2024-03-26

## 2024-03-26 RX ORDER — LISINOPRIL 5 MG/1
5 TABLET ORAL DAILY
Qty: 30 TABLET | Refills: 1 | OUTPATIENT
Start: 2024-03-26

## 2024-04-01 DIAGNOSIS — I25.10 CORONARY ARTERY DISEASE INVOLVING NATIVE CORONARY ARTERY OF NATIVE HEART WITHOUT ANGINA PECTORIS: ICD-10-CM

## 2024-04-02 DIAGNOSIS — I25.10 CORONARY ARTERY DISEASE INVOLVING NATIVE CORONARY ARTERY OF NATIVE HEART WITHOUT ANGINA PECTORIS: ICD-10-CM

## 2024-04-02 RX ORDER — LISINOPRIL 5 MG/1
5 TABLET ORAL DAILY
Qty: 30 TABLET | Refills: 1 | OUTPATIENT
Start: 2024-04-02

## 2024-04-02 RX ORDER — METOPROLOL SUCCINATE 50 MG/1
50 TABLET, EXTENDED RELEASE ORAL DAILY
Qty: 90 TABLET | Refills: 0 | Status: SHIPPED | OUTPATIENT
Start: 2024-04-02

## 2024-04-02 NOTE — TELEPHONE ENCOUNTER
Medication:   Requested Prescriptions     Pending Prescriptions Disp Refills    lisinopril (PRINIVIL;ZESTRIL) 5 MG tablet [Pharmacy Med Name: LISINOPRIL 5 MG TABLET] 30 tablet 1     Sig: TAKE 1 TABLET BY MOUTH DAILY        Last Filled: 10/05/2023 #30 with 1 refill     Patient Phone Number: [phone] (home) [phone] (work)    Last appt: 10/17/2023   Next appt: Visit date not found    Last OARRS:        No data to display

## 2024-04-02 NOTE — TELEPHONE ENCOUNTER
Medication:   Requested Prescriptions     Pending Prescriptions Disp Refills    metoprolol succinate (TOPROL XL) 50 MG extended release tablet [Pharmacy Med Name: METOPROLOL SUCC ER 50 MG TAB] 90 tablet 0     Sig: TAKE 1 TABLET BY MOUTH DAILY        Last Filled:  2/24/24    Patient Phone Number: [phone] (home) [phone] (work)    Last appt: 10/17/2023   Next appt: Visit date not found    Last OARRS:        No data to display

## 2024-04-11 DIAGNOSIS — E10.69 TYPE 1 DIABETES MELLITUS WITH OTHER SPECIFIED COMPLICATION (HCC): ICD-10-CM

## 2024-04-12 RX ORDER — INSULIN ASPART 100 [IU]/ML
INJECTION, SOLUTION INTRAVENOUS; SUBCUTANEOUS
Qty: 10 ML | Refills: 2 | OUTPATIENT
Start: 2024-04-12

## 2024-05-23 DIAGNOSIS — E10.69 TYPE 1 DIABETES MELLITUS WITH OTHER SPECIFIED COMPLICATION (HCC): ICD-10-CM

## 2024-05-23 RX ORDER — INSULIN ASPART 100 [IU]/ML
INJECTION, SOLUTION INTRAVENOUS; SUBCUTANEOUS
Qty: 10 ML | Refills: 2 | OUTPATIENT
Start: 2024-05-23

## 2024-05-23 NOTE — TELEPHONE ENCOUNTER
Patient has been notified that she needs to come in for an appointment. Patient has not been seen in the office since 06/06/2023. Rx is being denied.

## 2024-05-26 ENCOUNTER — TELEPHONE (OUTPATIENT)
Dept: PRIMARY CARE CLINIC | Age: 59
End: 2024-05-26

## 2024-05-26 DIAGNOSIS — E10.69 TYPE 1 DIABETES MELLITUS WITH OTHER SPECIFIED COMPLICATION (HCC): Primary | ICD-10-CM

## 2024-05-26 RX ORDER — INSULIN ASPART 100 [IU]/ML
INJECTION, SOLUTION INTRAVENOUS; SUBCUTANEOUS
Qty: 10 ML | Refills: 2 | Status: SHIPPED | OUTPATIENT
Start: 2024-05-26

## 2024-06-06 ENCOUNTER — TELEMEDICINE (OUTPATIENT)
Dept: PRIMARY CARE CLINIC | Age: 59
End: 2024-06-06

## 2024-06-06 DIAGNOSIS — F32.A DEPRESSION, UNSPECIFIED DEPRESSION TYPE: ICD-10-CM

## 2024-06-06 DIAGNOSIS — E10.69 TYPE 1 DIABETES MELLITUS WITH OTHER SPECIFIED COMPLICATION (HCC): ICD-10-CM

## 2024-06-06 DIAGNOSIS — I25.10 CORONARY ARTERY DISEASE INVOLVING NATIVE CORONARY ARTERY OF NATIVE HEART WITHOUT ANGINA PECTORIS: ICD-10-CM

## 2024-06-06 DIAGNOSIS — Z20.822 ENCOUNTER FOR LABORATORY TESTING FOR COVID-19 VIRUS: Primary | ICD-10-CM

## 2024-06-06 DIAGNOSIS — J02.9 PHARYNGITIS, UNSPECIFIED ETIOLOGY: ICD-10-CM

## 2024-06-06 LAB
Lab: NORMAL
QC PASS/FAIL: NORMAL
SARS-COV-2 RDRP RESP QL NAA+PROBE: NEGATIVE

## 2024-06-06 RX ORDER — AMOXICILLIN 500 MG/1
500 CAPSULE ORAL 3 TIMES DAILY
Qty: 30 CAPSULE | Refills: 0 | Status: SHIPPED | OUTPATIENT
Start: 2024-06-06 | End: 2024-06-16

## 2024-06-06 RX ORDER — INSULIN GLARGINE 100 [IU]/ML
INJECTION, SOLUTION SUBCUTANEOUS
Qty: 15 ML | Refills: 2 | Status: SHIPPED | OUTPATIENT
Start: 2024-06-06

## 2024-06-06 ASSESSMENT — ENCOUNTER SYMPTOMS
ABDOMINAL PAIN: 0
VOMITING: 0
SORE THROAT: 1
SHORTNESS OF BREATH: 0
BLOOD IN STOOL: 0
NAUSEA: 0
COUGH: 0

## 2024-06-06 NOTE — PROGRESS NOTES
2024    TELEHEALTH EVALUATION -- Audio/Visual    HPI:    Zuleima Petty (:  1965) has requested an audio/video evaluation for the following concern(s):    Pt reports sore throat, fatigue and loss of taste x 3 days    Pt did not see Ophthalmology or Neurology as directed. Patient has been noncompliant with followup office visits.      Pt was born extremely premature at 6 months     Patient did not see Endocrinology as directed and has been taking lantus insulin 24 units daily and novolog insulin 12 units TID before meals and reports elevated blood sugar readings over the past 3 days     Pt has a hx of type 1 diabetes Dx at 6 y/o      Pt has a hx of diabetic retinopathy and used to be followed by Ophthalmology      Pt is on prozac daily for depression with good control     Surgical hx includes appendectomy and hernia repair     Pt did complete her COVID vaccine series     Patient has a past medical history significant for CAD s/p stent placement x 4 and is followed by Cardiology and is on crestor, toprol xl, norvasc, Baby ASA and plavix medications      Pt is an exsmoker Quit  and used to abuse alcohol in the past; Pt is a retired correctional counselor     FHx positive for early CAD      Review of Systems   Constitutional:  Positive for fatigue. Negative for fever.   HENT:  Positive for sore throat. Negative for congestion and nosebleeds.    Eyes:         Negative blurred vision or diplopia   Respiratory:  Negative for cough and shortness of breath.    Cardiovascular:  Negative for chest pain, palpitations and leg swelling.   Gastrointestinal:  Negative for abdominal pain, blood in stool, nausea and vomiting.        Negative melena or indigestion   Endocrine: Positive for polyuria (with recent viral URI). Negative for polydipsia.   Genitourinary:  Negative for dysuria and hematuria.   Musculoskeletal:  Negative for arthralgias.   Skin:  Negative for rash.   Neurological:  Positive for headaches.

## 2024-06-07 RX ORDER — LISINOPRIL 5 MG/1
5 TABLET ORAL DAILY
Qty: 30 TABLET | Refills: 0 | Status: SHIPPED | OUTPATIENT
Start: 2024-06-07

## 2024-06-07 NOTE — TELEPHONE ENCOUNTER
Medication:   Requested Prescriptions     Pending Prescriptions Disp Refills    lisinopril (PRINIVIL;ZESTRIL) 5 MG tablet [Pharmacy Med Name: LISINOPRIL 5 MG TABLET] 30 tablet 1     Sig: TAKE 1 TABLET BY MOUTH DAILY        Last Filled:  10/05/2023 # 30 refills 1    Patient Phone Number: [phone] (home) [phone] (work)    Last appt: 6/6/2024   Next appt: Visit date not found    Last OARRS:        No data to display

## 2024-06-17 DIAGNOSIS — I25.10 CORONARY ARTERY DISEASE INVOLVING NATIVE CORONARY ARTERY OF NATIVE HEART WITHOUT ANGINA PECTORIS: ICD-10-CM

## 2024-06-17 RX ORDER — CLOPIDOGREL BISULFATE 75 MG/1
75 TABLET ORAL DAILY
Qty: 30 TABLET | Refills: 1 | OUTPATIENT
Start: 2024-06-17

## 2024-06-24 DIAGNOSIS — F32.A DEPRESSION, UNSPECIFIED DEPRESSION TYPE: ICD-10-CM

## 2024-06-24 RX ORDER — FLUOXETINE HYDROCHLORIDE 40 MG/1
40 CAPSULE ORAL DAILY
Qty: 30 CAPSULE | Refills: 1 | OUTPATIENT
Start: 2024-06-24

## 2024-06-28 ENCOUNTER — TELEPHONE (OUTPATIENT)
Dept: PRIMARY CARE CLINIC | Age: 59
End: 2024-06-28

## 2024-06-28 DIAGNOSIS — F32.A DEPRESSION, UNSPECIFIED DEPRESSION TYPE: ICD-10-CM

## 2024-06-28 RX ORDER — FLUOXETINE HYDROCHLORIDE 40 MG/1
40 CAPSULE ORAL DAILY
Qty: 30 CAPSULE | Refills: 1 | OUTPATIENT
Start: 2024-06-28

## 2024-06-28 NOTE — TELEPHONE ENCOUNTER
Pt wants dismissal letter sent to her again. She states she did not get the first letter.  Sending letter out. Mail will go out Monday. Pt gave the address of [address] [place], [place] [address] [address].    No further action required.

## 2024-07-10 ENCOUNTER — COMMUNITY OUTREACH (OUTPATIENT)
Dept: PRIMARY CARE CLINIC | Age: 59
End: 2024-07-10

## 2024-07-27 DIAGNOSIS — I25.10 CORONARY ARTERY DISEASE INVOLVING NATIVE CORONARY ARTERY OF NATIVE HEART WITHOUT ANGINA PECTORIS: ICD-10-CM

## 2024-07-27 RX ORDER — CLOPIDOGREL BISULFATE 75 MG/1
75 TABLET ORAL DAILY
Qty: 30 TABLET | Refills: 1 | OUTPATIENT
Start: 2024-07-27

## 2024-08-06 DIAGNOSIS — I25.10 CORONARY ARTERY DISEASE INVOLVING NATIVE CORONARY ARTERY OF NATIVE HEART WITHOUT ANGINA PECTORIS: ICD-10-CM

## 2024-08-06 DIAGNOSIS — F32.A DEPRESSION, UNSPECIFIED DEPRESSION TYPE: ICD-10-CM

## 2024-08-06 RX ORDER — AMLODIPINE BESYLATE 5 MG/1
5 TABLET ORAL DAILY
Qty: 90 TABLET | OUTPATIENT
Start: 2024-08-06

## 2024-08-06 RX ORDER — CLOPIDOGREL BISULFATE 75 MG/1
75 TABLET ORAL DAILY
Qty: 30 TABLET | Refills: 1 | OUTPATIENT
Start: 2024-08-06

## 2024-08-06 RX ORDER — ROSUVASTATIN CALCIUM 40 MG/1
TABLET, COATED ORAL
Qty: 90 TABLET | OUTPATIENT
Start: 2024-08-06

## 2024-08-06 RX ORDER — FLUOXETINE HYDROCHLORIDE 40 MG/1
40 CAPSULE ORAL DAILY
Qty: 30 CAPSULE | Refills: 1 | OUTPATIENT
Start: 2024-08-06

## 2024-08-06 RX ORDER — METOPROLOL SUCCINATE 50 MG/1
50 TABLET, EXTENDED RELEASE ORAL DAILY
Qty: 90 TABLET | Refills: 0 | OUTPATIENT
Start: 2024-08-06

## 2024-08-07 DIAGNOSIS — I25.10 CORONARY ARTERY DISEASE INVOLVING NATIVE CORONARY ARTERY OF NATIVE HEART WITHOUT ANGINA PECTORIS: ICD-10-CM

## 2024-08-07 DIAGNOSIS — F32.A DEPRESSION, UNSPECIFIED DEPRESSION TYPE: ICD-10-CM

## 2024-08-07 RX ORDER — CLOPIDOGREL BISULFATE 75 MG/1
75 TABLET ORAL DAILY
Qty: 30 TABLET | Refills: 1 | OUTPATIENT
Start: 2024-08-07

## 2024-08-07 RX ORDER — FLUOXETINE HYDROCHLORIDE 40 MG/1
40 CAPSULE ORAL DAILY
Qty: 30 CAPSULE | Refills: 1 | OUTPATIENT
Start: 2024-08-07

## 2024-08-07 RX ORDER — ROSUVASTATIN CALCIUM 40 MG/1
TABLET, COATED ORAL
Qty: 30 TABLET | Refills: 2 | OUTPATIENT
Start: 2024-08-07

## 2024-09-24 ENCOUNTER — TELEPHONE (OUTPATIENT)
Dept: PRIMARY CARE CLINIC | Age: 59
End: 2024-09-24

## 2024-09-24 DIAGNOSIS — E10.69 TYPE 1 DIABETES MELLITUS WITH OTHER SPECIFIED COMPLICATION (HCC): Primary | ICD-10-CM

## 2024-09-24 RX ORDER — INSULIN GLARGINE 100 [IU]/ML
INJECTION, SOLUTION SUBCUTANEOUS
Qty: 15 ML | Refills: 2 | Status: SHIPPED | OUTPATIENT
Start: 2024-09-24

## 2024-09-24 RX ORDER — INSULIN ASPART 100 [IU]/ML
INJECTION, SOLUTION INTRAVENOUS; SUBCUTANEOUS
Qty: 10 ML | Refills: 2 | Status: SHIPPED | OUTPATIENT
Start: 2024-09-24

## 2024-10-03 ENCOUNTER — TELEPHONE (OUTPATIENT)
Dept: PRIMARY CARE CLINIC | Age: 59
End: 2024-10-03

## 2024-10-03 NOTE — TELEPHONE ENCOUNTER
Pt called to make an appt. Pt made aware she has been dismissed from this practice back in June due to a no-show history.    Gave pt the provider search line of [phone].

## 2024-10-04 ENCOUNTER — OFFICE VISIT (OUTPATIENT)
Dept: PRIMARY CARE CLINIC | Age: 59
End: 2024-10-04

## 2024-10-04 VITALS
BODY MASS INDEX: 29.79 KG/M2 | SYSTOLIC BLOOD PRESSURE: 144 MMHG | OXYGEN SATURATION: 100 % | HEART RATE: 72 BPM | WEIGHT: 162.9 LBS | DIASTOLIC BLOOD PRESSURE: 78 MMHG

## 2024-10-04 DIAGNOSIS — I25.10 CORONARY ARTERY DISEASE INVOLVING NATIVE CORONARY ARTERY OF NATIVE HEART WITHOUT ANGINA PECTORIS: Primary | ICD-10-CM

## 2024-10-04 DIAGNOSIS — Z23 NEED FOR INFLUENZA VACCINATION: ICD-10-CM

## 2024-10-04 DIAGNOSIS — Z12.4 ENCOUNTER FOR PAPANICOLAOU SMEAR OF CERVIX: ICD-10-CM

## 2024-10-04 DIAGNOSIS — E10.69 TYPE 1 DIABETES MELLITUS WITH OTHER SPECIFIED COMPLICATION (HCC): ICD-10-CM

## 2024-10-04 DIAGNOSIS — Z12.31 ENCOUNTER FOR SCREENING MAMMOGRAM FOR MALIGNANT NEOPLASM OF BREAST: ICD-10-CM

## 2024-10-04 DIAGNOSIS — F32.A DEPRESSION, UNSPECIFIED DEPRESSION TYPE: ICD-10-CM

## 2024-10-04 DIAGNOSIS — E78.5 DYSLIPIDEMIA: ICD-10-CM

## 2024-10-04 LAB — HBA1C MFR BLD: 9.6 %

## 2024-10-04 RX ORDER — LISINOPRIL 10 MG/1
10 TABLET ORAL DAILY
Qty: 30 TABLET | Refills: 0 | Status: SHIPPED | OUTPATIENT
Start: 2024-10-04 | End: 2024-11-03

## 2024-10-04 RX ORDER — NITROGLYCERIN 0.3 MG/1
TABLET SUBLINGUAL
Qty: 30 TABLET | Refills: 1 | Status: SHIPPED | OUTPATIENT
Start: 2024-10-04

## 2024-10-04 RX ORDER — FLUOXETINE 40 MG/1
40 CAPSULE ORAL DAILY
Qty: 30 CAPSULE | Refills: 1 | Status: SHIPPED | OUTPATIENT
Start: 2024-10-04

## 2024-10-04 RX ORDER — INSULIN GLARGINE 100 [IU]/ML
INJECTION, SOLUTION SUBCUTANEOUS
Qty: 15 ML | Refills: 2 | Status: SHIPPED | OUTPATIENT
Start: 2024-10-04

## 2024-10-04 SDOH — ECONOMIC STABILITY: INCOME INSECURITY: HOW HARD IS IT FOR YOU TO PAY FOR THE VERY BASICS LIKE FOOD, HOUSING, MEDICAL CARE, AND HEATING?: NOT HARD AT ALL

## 2024-10-04 SDOH — ECONOMIC STABILITY: FOOD INSECURITY: WITHIN THE PAST 12 MONTHS, YOU WORRIED THAT YOUR FOOD WOULD RUN OUT BEFORE YOU GOT MONEY TO BUY MORE.: NEVER TRUE

## 2024-10-04 SDOH — ECONOMIC STABILITY: FOOD INSECURITY: WITHIN THE PAST 12 MONTHS, THE FOOD YOU BOUGHT JUST DIDN'T LAST AND YOU DIDN'T HAVE MONEY TO GET MORE.: NEVER TRUE

## 2024-10-04 ASSESSMENT — PATIENT HEALTH QUESTIONNAIRE - PHQ9

## 2024-10-04 NOTE — PROGRESS NOTES
[facility]   Establish care visit   10/4/2024    Zuleima Petty (:  1965) is a 59 y.o. female, here to establish care.    Chief Complaint   Patient presents with    New Patient     Needs prescriptions  Been having trouble with short term memory loss (especially numbers)        ASSESSMENT/ PLAN  1. Depression, unspecified depression type  - FLUoxetine (PROZAC) 40 MG capsule; Take 1 capsule by mouth daily  Dispense: 30 capsule; Refill: 1    2. Type 1 diabetes mellitus with other specified complication (HCC)  - insulin glargine (LANTUS SOLOSTAR) 100 UNIT/ML injection pen; INJECT 24 UNITS SUBCUTANEOUSLY NIGHTLY  Dispense: 15 mL; Refill: 2  - AFL - Libby oBwling MD, Endocrinology, [facility]  - POCT glycosylated hemoglobin (Hb A1C)  - MICROALBUMIN / CREATININE URINE RATIO; Future  -  DIABETES FOOT EXAM    3. Coronary artery disease involving native coronary artery of native heart without angina pectoris  - nitroGLYCERIN (NITROSTAT) 0.3 MG SL tablet; For up to 3 doses or 3 times  Dispense: 30 tablet; Refill: 1  - lisinopril (PRINIVIL;ZESTRIL) 10 MG tablet; Take 1 tablet by mouth daily  Dispense: 30 tablet; Refill: 0  - CBC with Auto Differential; Future  - Comprehensive Metabolic Panel; Future  - TSH; Future  - T4, Free; Future    4. Dyslipidemia  - Lipid Panel; Future    5. Encounter for screening mammogram for malignant neoplasm of breast  - [facility] ROBERT DIGITAL SCREEN BILATERAL; Future    6. Encounter for Papanicolaou smear of cervix  - AFL - Kell Rivero MD, Gynecology, [facility]    7. Need for influenza vaccination  - Influenza, FLUCELVAX Trivalent, (age 6 mo+) IM, Preservative Free, 0.5mL       No follow-ups on file.    HPI  Patient is here to establish care; she is a type 1 diabetes patient and stated she recently had a reaction to insulin which has affected her short term memory.    ROS  Review of Systems   Constitutional: Negative.    HENT: Negative.

## 2024-10-09 DIAGNOSIS — I25.10 CORONARY ARTERY DISEASE INVOLVING NATIVE CORONARY ARTERY OF NATIVE HEART WITHOUT ANGINA PECTORIS: ICD-10-CM

## 2024-10-09 RX ORDER — CLOPIDOGREL BISULFATE 75 MG/1
75 TABLET ORAL DAILY
Qty: 30 TABLET | Refills: 1 | Status: SHIPPED | OUTPATIENT
Start: 2024-10-09

## 2024-10-09 RX ORDER — AMLODIPINE BESYLATE 5 MG/1
5 TABLET ORAL DAILY
Qty: 30 TABLET | Refills: 2 | Status: SHIPPED | OUTPATIENT
Start: 2024-10-09

## 2024-10-09 RX ORDER — ROSUVASTATIN CALCIUM 40 MG/1
TABLET, COATED ORAL
Qty: 30 TABLET | Refills: 2 | Status: SHIPPED | OUTPATIENT
Start: 2024-10-09

## 2024-10-09 NOTE — TELEPHONE ENCOUNTER
Medication:   Requested Prescriptions     Pending Prescriptions Disp Refills    amLODIPine (NORVASC) 5 MG tablet 30 tablet 2     Sig: Take 1 tablet by mouth daily    clopidogrel (PLAVIX) 75 MG tablet 30 tablet 1     Sig: Take 1 tablet by mouth daily    rosuvastatin (CRESTOR) 40 MG tablet 30 tablet 2     Sig: TAKE ONE TABLET BY MOUTH EVERY EVENING      Patient Phone Number: [phone] (home) [phone] (work)    Last appt: 10/4/2024   Next appt: Visit date not found

## 2024-11-20 ENCOUNTER — TELEPHONE (OUTPATIENT)
Dept: PRIMARY CARE CLINIC | Age: 59
End: 2024-11-20

## 2024-11-20 NOTE — TELEPHONE ENCOUNTER
Attempted to call pt to have her get blood work done then set up an appointment to discuss.   Unable to leave a VM

## 2024-11-30 DIAGNOSIS — I25.10 CORONARY ARTERY DISEASE INVOLVING NATIVE CORONARY ARTERY OF NATIVE HEART WITHOUT ANGINA PECTORIS: ICD-10-CM

## 2024-12-02 NOTE — TELEPHONE ENCOUNTER
Medication:   Requested Prescriptions     Pending Prescriptions Disp Refills    lisinopril (PRINIVIL;ZESTRIL) 10 MG tablet [Pharmacy Med Name: LISINOPRIL 10 MG TABLET] 30 tablet 0     Sig: TAKE 1 TABLET BY MOUTH DAILY        Last Filled:  10/4/24 #30 0 refills    Patient Phone Number: [phone] (home) [phone] (work)    Last appt: 10/4/2024   Next appt: Visit date not found    Last OARRS:        No data to display

## 2024-12-03 RX ORDER — LISINOPRIL 10 MG/1
10 TABLET ORAL DAILY
Qty: 30 TABLET | Refills: 0 | Status: SHIPPED | OUTPATIENT
Start: 2024-12-03 | End: 2025-01-02

## 2024-12-27 NOTE — TELEPHONE ENCOUNTER
LVM for patient to return call to office. Patient called today with regards to the following prescription request: Current    Provider: Shiva Clements MD  Medication: HYDROcodone-acetaminophen (NORCO) 5-325 MG per tablet   How do you take your medication: 1 tablet by mouth daily  Supply Requested: Refill    Medication dosage/(s) was verified with patient and is current: Yes  Pharmacy was loaded and verified Yes    For additional information, or if you need to contact the caller at the following number:    Contact Phone Number:   Mobile [phone]       Patient states that it is okay to leave a detailed message.    Preferred time for callback: Anytime    Advised Patient that refill processing may take up to 2 business day, and that the pharmacy will contact them when their prescription is ready for pickup.

## 2025-01-04 DIAGNOSIS — I25.10 CORONARY ARTERY DISEASE INVOLVING NATIVE CORONARY ARTERY OF NATIVE HEART WITHOUT ANGINA PECTORIS: ICD-10-CM

## 2025-01-04 DIAGNOSIS — F32.A DEPRESSION, UNSPECIFIED DEPRESSION TYPE: ICD-10-CM

## 2025-01-04 RX ORDER — METOPROLOL SUCCINATE 50 MG/1
50 TABLET, EXTENDED RELEASE ORAL DAILY
Qty: 90 TABLET | Refills: 0 | OUTPATIENT
Start: 2025-01-04

## 2025-01-05 RX ORDER — FLUOXETINE 40 MG/1
40 CAPSULE ORAL DAILY
Qty: 30 CAPSULE | Refills: 1 | Status: SHIPPED | OUTPATIENT
Start: 2025-01-05

## 2025-01-08 DIAGNOSIS — F32.A DEPRESSION, UNSPECIFIED DEPRESSION TYPE: ICD-10-CM

## 2025-01-08 RX ORDER — FLUOXETINE 40 MG/1
40 CAPSULE ORAL DAILY
Qty: 30 CAPSULE | Refills: 1 | OUTPATIENT
Start: 2025-01-08

## 2025-01-18 DIAGNOSIS — I25.10 CORONARY ARTERY DISEASE INVOLVING NATIVE CORONARY ARTERY OF NATIVE HEART WITHOUT ANGINA PECTORIS: ICD-10-CM

## 2025-01-20 RX ORDER — ROSUVASTATIN CALCIUM 40 MG/1
TABLET, COATED ORAL
Qty: 90 TABLET | Refills: 1 | Status: SHIPPED | OUTPATIENT
Start: 2025-01-20

## 2025-01-20 RX ORDER — CLOPIDOGREL BISULFATE 75 MG/1
75 TABLET ORAL DAILY
Qty: 60 TABLET | Refills: 1 | Status: SHIPPED | OUTPATIENT
Start: 2025-01-20

## 2025-01-20 NOTE — TELEPHONE ENCOUNTER
Medication:   Requested Prescriptions     Pending Prescriptions Disp Refills    clopidogrel (PLAVIX) 75 MG tablet [Pharmacy Med Name: CLOPIDOGREL 75 MG TABLET] 60 tablet      Sig: TAKE 1 TABLET BY MOUTH DAILY    rosuvastatin (CRESTOR) 40 MG tablet [Pharmacy Med Name: ROSUVASTATIN CALCIUM 40 MG TAB] 90 tablet      Sig: TAKE ONE TABLET BY MOUTH EVERY EVENING        Last Filled:  10/9/24    Patient Phone Number: [phone] (home) [phone] (work)    Last appt: 10/4/2024   Next appt: Visit date not found    Last OARRS:        No data to display

## 2025-01-22 ENCOUNTER — TELEPHONE (OUTPATIENT)
Dept: PRIMARY CARE CLINIC | Age: 60
End: 2025-01-22

## 2025-02-12 ENCOUNTER — OFFICE VISIT (OUTPATIENT)
Dept: PRIMARY CARE CLINIC | Age: 60
End: 2025-02-12
Payer: MEDICAID

## 2025-02-12 VITALS
WEIGHT: 168 LBS | DIASTOLIC BLOOD PRESSURE: 77 MMHG | SYSTOLIC BLOOD PRESSURE: 170 MMHG | BODY MASS INDEX: 30.91 KG/M2 | TEMPERATURE: 98.3 F | RESPIRATION RATE: 14 BRPM | OXYGEN SATURATION: 99 % | HEART RATE: 86 BPM | HEIGHT: 62 IN

## 2025-02-12 DIAGNOSIS — F32.A DEPRESSION, UNSPECIFIED DEPRESSION TYPE: ICD-10-CM

## 2025-02-12 DIAGNOSIS — E10.69 TYPE 1 DIABETES MELLITUS WITH OTHER SPECIFIED COMPLICATION (HCC): Primary | ICD-10-CM

## 2025-02-12 DIAGNOSIS — I25.10 CORONARY ARTERY DISEASE INVOLVING NATIVE CORONARY ARTERY OF NATIVE HEART WITHOUT ANGINA PECTORIS: ICD-10-CM

## 2025-02-12 DIAGNOSIS — I10 PRIMARY HYPERTENSION: ICD-10-CM

## 2025-02-12 DIAGNOSIS — R42 VERTIGO: ICD-10-CM

## 2025-02-12 LAB — HBA1C MFR BLD: 9.8 %

## 2025-02-12 PROCEDURE — 99214 OFFICE O/P EST MOD 30 MIN: CPT | Performed by: NURSE PRACTITIONER

## 2025-02-12 PROCEDURE — 3078F DIAST BP <80 MM HG: CPT | Performed by: NURSE PRACTITIONER

## 2025-02-12 PROCEDURE — 3046F HEMOGLOBIN A1C LEVEL >9.0%: CPT | Performed by: NURSE PRACTITIONER

## 2025-02-12 PROCEDURE — 83036 HEMOGLOBIN GLYCOSYLATED A1C: CPT | Performed by: NURSE PRACTITIONER

## 2025-02-12 PROCEDURE — 3077F SYST BP >= 140 MM HG: CPT | Performed by: NURSE PRACTITIONER

## 2025-02-12 RX ORDER — FLUOXETINE 40 MG/1
40 CAPSULE ORAL DAILY
Qty: 30 CAPSULE | Refills: 1 | Status: SHIPPED | OUTPATIENT
Start: 2025-02-12

## 2025-02-12 RX ORDER — ASPIRIN 81 MG/1
81 TABLET ORAL DAILY
Qty: 30 TABLET | Refills: 1 | Status: SHIPPED | OUTPATIENT
Start: 2025-02-12

## 2025-02-12 RX ORDER — INSULIN GLARGINE 100 [IU]/ML
INJECTION, SOLUTION SUBCUTANEOUS
Qty: 15 ML | Refills: 2 | Status: SHIPPED | OUTPATIENT
Start: 2025-02-12

## 2025-02-12 RX ORDER — INSULIN ASPART 100 [IU]/ML
INJECTION, SOLUTION INTRAVENOUS; SUBCUTANEOUS
Qty: 10 ML | Refills: 2 | Status: SHIPPED | OUTPATIENT
Start: 2025-02-12

## 2025-02-12 RX ORDER — LISINOPRIL 20 MG/1
20 TABLET ORAL DAILY
Qty: 90 TABLET | Refills: 1 | Status: SHIPPED | OUTPATIENT
Start: 2025-02-12

## 2025-02-12 RX ORDER — MECLIZINE HYDROCHLORIDE 25 MG/1
25 TABLET ORAL 3 TIMES DAILY PRN
Qty: 30 TABLET | Refills: 0 | Status: SHIPPED | OUTPATIENT
Start: 2025-02-12 | End: 2025-02-22

## 2025-02-12 RX ORDER — CLOPIDOGREL BISULFATE 75 MG/1
75 TABLET ORAL DAILY
Qty: 60 TABLET | Refills: 1 | Status: SHIPPED | OUTPATIENT
Start: 2025-02-12

## 2025-02-12 RX ORDER — METOPROLOL SUCCINATE 50 MG/1
50 TABLET, EXTENDED RELEASE ORAL DAILY
Qty: 90 TABLET | Refills: 0 | Status: SHIPPED | OUTPATIENT
Start: 2025-02-12

## 2025-02-12 RX ORDER — ROSUVASTATIN CALCIUM 40 MG/1
TABLET, COATED ORAL
Qty: 90 TABLET | Refills: 1 | Status: SHIPPED | OUTPATIENT
Start: 2025-02-12

## 2025-02-12 RX ORDER — AMLODIPINE BESYLATE 5 MG/1
5 TABLET ORAL DAILY
Qty: 30 TABLET | Refills: 2 | Status: SHIPPED | OUTPATIENT
Start: 2025-02-12

## 2025-02-12 SDOH — ECONOMIC STABILITY: FOOD INSECURITY: WITHIN THE PAST 12 MONTHS, YOU WORRIED THAT YOUR FOOD WOULD RUN OUT BEFORE YOU GOT MONEY TO BUY MORE.: NEVER TRUE

## 2025-02-12 SDOH — ECONOMIC STABILITY: FOOD INSECURITY: WITHIN THE PAST 12 MONTHS, THE FOOD YOU BOUGHT JUST DIDN'T LAST AND YOU DIDN'T HAVE MONEY TO GET MORE.: NEVER TRUE

## 2025-02-12 NOTE — PROGRESS NOTES
Zuleima Petty (:  1965) is a 60 y.o. female,Established patient, here for evaluation of the following chief complaint(s):  Flu verses depression         Assessment & Plan  Type 1 diabetes mellitus with other specified complication (HCC)       Orders:    POCT glycosylated hemoglobin (Hb A1C)    insulin aspart (NOVOLOG) 100 UNIT/ML injection vial; INJECT 12 UNITS THREE TIMES DAILY BEFORE MEAL(S)    insulin glargine (LANTUS SOLOSTAR) 100 UNIT/ML injection pen; INJECT 30 UNITS SUBCUTANEOUSLY NIGHTLY    Coronary artery disease involving native coronary artery of native heart without angina pectoris       Orders:    amLODIPine (NORVASC) 5 MG tablet; Take 1 tablet by mouth daily    clopidogrel (PLAVIX) 75 MG tablet; Take 1 tablet by mouth daily    rosuvastatin (CRESTOR) 40 MG tablet; TAKE ONE TABLET BY MOUTH EVERY EVENING    metoprolol succinate (TOPROL XL) 50 MG extended release tablet; Take 1 tablet by mouth daily    Depression, unspecified depression type       Orders:    FLUoxetine (PROZAC) 40 MG capsule; Take 1 capsule by mouth daily    Primary hypertension       Orders:    lisinopril (PRINIVIL;ZESTRIL) 20 MG tablet; Take 1 tablet by mouth daily    Vertigo       Orders:    meclizine (ANTIVERT) 25 MG tablet; Take 1 tablet by mouth 3 times daily as needed for Dizziness      No follow-ups on file.       Subjective   HPI  Patient presents for medication refill for type 1 diabetes; advised patient that type 1 diabetes needed to be treated by an endocrinologist.  Patient stated that she was treated by an endocrinologist but she needs to figure her way back to them as she lost her insurance form her being in [facility].  She complains of dizziness. She stated she was previously diagnosed with vertigo and stopped taking the medication and wanted a refill of meclizine   Review of Systems   Constitutional: Negative.    HENT: Negative.     Eyes: Negative.    Respiratory: Negative.     Cardiovascular: Negative.

## 2025-02-14 RX ORDER — LISINOPRIL 10 MG/1
10 TABLET ORAL DAILY
Qty: 30 TABLET | Refills: 0 | OUTPATIENT
Start: 2025-02-14

## 2025-02-14 ASSESSMENT — ENCOUNTER SYMPTOMS
RESPIRATORY NEGATIVE: 1
EYES NEGATIVE: 1
GASTROINTESTINAL NEGATIVE: 1

## 2025-02-14 NOTE — TELEPHONE ENCOUNTER
Medication:   Requested Prescriptions     Pending Prescriptions Disp Refills    lisinopril (PRINIVIL;ZESTRIL) 10 MG tablet [Pharmacy Med Name: LISINOPRIL 10 MG TABLET] 30 tablet 0     Sig: TAKE 1 TABLET BY MOUTH DAILY        Last Filled:  02/12/2025 #90 1rf    Duplicate request     Patient Phone Number: [phone] (home) [phone] (work)    Last appt: 2/12/2025   Next appt: 5/13/2025    Last OARRS:        No data to display

## 2025-04-08 ENCOUNTER — TELEPHONE (OUTPATIENT)
Dept: PRIMARY CARE CLINIC | Age: 60
End: 2025-04-08

## 2025-04-08 DIAGNOSIS — I25.10 CORONARY ARTERY DISEASE INVOLVING NATIVE CORONARY ARTERY OF NATIVE HEART WITHOUT ANGINA PECTORIS: ICD-10-CM

## 2025-04-08 NOTE — TELEPHONE ENCOUNTER
Medication and Quantity requested:   amLODIPine (NORVASC) 5 MG tablet      Last Visit  2/12/25    Pharmacy and phone number updated in [facility]:  yes  Judy

## 2025-04-09 RX ORDER — AMLODIPINE BESYLATE 5 MG/1
5 TABLET ORAL DAILY
Qty: 30 TABLET | Refills: 2 | OUTPATIENT
Start: 2025-04-09

## 2025-05-13 ENCOUNTER — OFFICE VISIT (OUTPATIENT)
Dept: PRIMARY CARE CLINIC | Age: 60
End: 2025-05-13
Payer: MEDICAID

## 2025-05-13 VITALS
OXYGEN SATURATION: 100 % | HEART RATE: 67 BPM | WEIGHT: 170.7 LBS | SYSTOLIC BLOOD PRESSURE: 138 MMHG | HEIGHT: 62 IN | TEMPERATURE: 98 F | BODY MASS INDEX: 31.41 KG/M2 | DIASTOLIC BLOOD PRESSURE: 78 MMHG

## 2025-05-13 DIAGNOSIS — E10.69 TYPE 1 DIABETES MELLITUS WITH OTHER SPECIFIED COMPLICATION (HCC): Primary | ICD-10-CM

## 2025-05-13 DIAGNOSIS — R10.11 RIGHT UPPER QUADRANT ABDOMINAL PAIN: ICD-10-CM

## 2025-05-13 DIAGNOSIS — Z12.11 SCREENING FOR COLON CANCER: ICD-10-CM

## 2025-05-13 LAB — HBA1C MFR BLD: 10.5 %

## 2025-05-13 PROCEDURE — 3075F SYST BP GE 130 - 139MM HG: CPT | Performed by: NURSE PRACTITIONER

## 2025-05-13 PROCEDURE — 99214 OFFICE O/P EST MOD 30 MIN: CPT | Performed by: NURSE PRACTITIONER

## 2025-05-13 PROCEDURE — 3078F DIAST BP <80 MM HG: CPT | Performed by: NURSE PRACTITIONER

## 2025-05-13 PROCEDURE — 83036 HEMOGLOBIN GLYCOSYLATED A1C: CPT | Performed by: NURSE PRACTITIONER

## 2025-05-13 PROCEDURE — 3046F HEMOGLOBIN A1C LEVEL >9.0%: CPT | Performed by: NURSE PRACTITIONER

## 2025-05-13 RX ORDER — INSULIN GLARGINE 100 [IU]/ML
INJECTION, SOLUTION SUBCUTANEOUS
Qty: 15 ML | Refills: 2 | Status: SHIPPED | OUTPATIENT
Start: 2025-05-13

## 2025-05-13 RX ORDER — PROCHLORPERAZINE 25 MG/1
1 SUPPOSITORY RECTAL
Qty: 1 EACH | Refills: 1 | Status: SHIPPED | OUTPATIENT
Start: 2025-05-13

## 2025-05-13 RX ORDER — PROCHLORPERAZINE 25 MG/1
1 SUPPOSITORY RECTAL DAILY
Qty: 1 EACH | Refills: 0 | Status: SHIPPED | OUTPATIENT
Start: 2025-05-13

## 2025-05-13 RX ORDER — MECLIZINE HYDROCHLORIDE 25 MG/1
25 TABLET ORAL 3 TIMES DAILY PRN
COMMUNITY

## 2025-05-13 ASSESSMENT — PATIENT HEALTH QUESTIONNAIRE - PHQ9
4. FEELING TIRED OR HAVING LITTLE ENERGY: NOT AT ALL
6. FEELING BAD ABOUT YOURSELF - OR THAT YOU ARE A FAILURE OR HAVE LET YOURSELF OR YOUR FAMILY DOWN: NOT AT ALL
SUM OF ALL RESPONSES TO PHQ QUESTIONS 1-9: 0
7. TROUBLE CONCENTRATING ON THINGS, SUCH AS READING THE NEWSPAPER OR WATCHING TELEVISION: NOT AT ALL
8. MOVING OR SPEAKING SO SLOWLY THAT OTHER PEOPLE COULD HAVE NOTICED. OR THE OPPOSITE, BEING SO FIGETY OR RESTLESS THAT YOU HAVE BEEN MOVING AROUND A LOT MORE THAN USUAL: NOT AT ALL
SUM OF ALL RESPONSES TO PHQ QUESTIONS 1-9: 0
5. POOR APPETITE OR OVEREATING: NOT AT ALL
9. THOUGHTS THAT YOU WOULD BE BETTER OFF DEAD, OR OF HURTING YOURSELF: NOT AT ALL
SUM OF ALL RESPONSES TO PHQ QUESTIONS 1-9: 0
1. LITTLE INTEREST OR PLEASURE IN DOING THINGS: NOT AT ALL
3. TROUBLE FALLING OR STAYING ASLEEP: NOT AT ALL
2. FEELING DOWN, DEPRESSED OR HOPELESS: NOT AT ALL
SUM OF ALL RESPONSES TO PHQ QUESTIONS 1-9: 0

## 2025-05-13 NOTE — PROGRESS NOTES
Zuleima Petty (:  1965) is a 60 y.o. female,Established patient, here for evaluation of the following chief complaint(s):  Diabetes (Pt. Present for diabetes f/u. Last A1C was 9.8 on 25.)         Assessment & Plan  Type 1 diabetes mellitus with other specified complication (HCC)         Orders:    POCT glycosylated hemoglobin (Hb A1C)    [facility] ROBERT DIGITAL DIAGNOSTIC BILATERAL; Future    insulin glargine (LANTUS SOLOSTAR) 100 UNIT/ML injection pen; INJECT 20 UNITS SUBCUTANEOUSLY NIGHTLY    Libby Doe MD, Endocrinology, [place]    Continuous Glucose Sensor (DEXCOM G6 SENSOR) MISC; 1 each by Does not apply route daily    Continuous Glucose  (DEXCOM G6 ) QUAN; 1 each by Does not apply route every 14 days    Screening for colon cancer       Orders:    Orlando Gama MD, Gastroenterology, [facility]    Right upper quadrant abdominal pain       Orders:    US ABDOMEN COMPLETE; Future      No follow-ups on file.       Subjective   HPI  Patient presents for management of type 1 diabetes. She reports that she does not currently have an endocrinologist managing her care. 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 Her most recent HbA1c was 10.5%, up from a prior reading of 9.8%, indicating poor glycemic control.  Patient is currently taking Lantus at night and Novolog with meals. Based on her reported symptoms and glucose patterns, discussed that the nighttime Lantus dose may need to be reduced and the Novolog adjusted to improve control and reduce overnight hypoglycemia. However, the patient expressed fear of hypoglycemia and declined the recommended dose adjustments. She stated she prefers to self-manage and try her own regimen.  Patient was counseled extensively on the risks of continued poor glycemic control and the importance of specialist care. Advised that she

## 2025-05-13 NOTE — ASSESSMENT & PLAN NOTE
Orders:    POCT glycosylated hemoglobin (Hb A1C)    [facility] ROBERT DIGITAL DIAGNOSTIC BILATERAL; Future    insulin glargine (LANTUS SOLOSTAR) 100 UNIT/ML injection pen; INJECT 20 UNITS SUBCUTANEOUSLY NIGHTLY    [facility]ETA - Libby Bowling MD, Endocrinology, [place]    Continuous Glucose Sensor (DEXCOM G6 SENSOR) MISC; 1 each by Does not apply route daily    Continuous Glucose  (DEXCOM G6 ) QUAN; 1 each by Does not apply route every 14 days

## 2025-05-13 NOTE — PATIENT INSTRUCTIONS
What is the Somogyi effect?  The Somogyi (so-MOH-gyee) effect happens when a low blood sugar (hypoglycemia) episode overnight leads to high blood sugar (hyperglycemia) in the morning due to a surge of hormones. It can affect people with diabetes who take insulin.  The effect was named after the doctor who first wrote about it in the 1930s -- Dr. Raghav Donahue. Healthcare providers sometimes call it “rebound hyperglycemia.”  Researchers consider the Somogyi effect a theory, meaning it’s not considered to be a fact, but there’s scientific evidence that supports it. While healthcare providers once thought of the effect as a likely cause of morning high blood sugar, more recent studies involving continuous glucose monitoring (CGM) have disputed the Somogyi effect.  The Somogyi effect is one of multiple possible causes of high blood sugar in the morning, including:  Too little medication, which wears off overnight.  Litzy phenomenon.  Miscalculating your medication dose with your evening meal/food consumption.  Insulin resistance.  What is the difference between the Somogyi effect and litzy phenomenon?  The Somogyi effect and litzy phenomenon are both phenomena that can cause people with diabetes to have high blood sugar in the morning.  Litzy phenomenon happens when hormones your body naturally makes in the early morning (including cortisol and growth hormone) increase your blood sugar.  The Somogyi effect also involves a surge of hormones, but it’s due to a low blood sugar episode overnight. Litzy phenomenon doesn’t happen because of low blood sugar.  Litzy phenomenon is also a much more common cause of high blood sugar in the morning than the Somogyi effect.

## 2025-05-16 ASSESSMENT — ENCOUNTER SYMPTOMS
GASTROINTESTINAL NEGATIVE: 1
RESPIRATORY NEGATIVE: 1
EYES NEGATIVE: 1

## 2025-05-17 DIAGNOSIS — I25.10 CORONARY ARTERY DISEASE INVOLVING NATIVE CORONARY ARTERY OF NATIVE HEART WITHOUT ANGINA PECTORIS: ICD-10-CM

## 2025-05-19 RX ORDER — METOPROLOL SUCCINATE 50 MG/1
50 TABLET, EXTENDED RELEASE ORAL DAILY
Qty: 90 TABLET | Refills: 0 | Status: SHIPPED | OUTPATIENT
Start: 2025-05-19

## 2025-05-19 NOTE — TELEPHONE ENCOUNTER
Medication:   Requested Prescriptions     Pending Prescriptions Disp Refills    metoprolol succinate (TOPROL XL) 50 MG extended release tablet [Pharmacy Med Name: METOPROLOL SUCC ER 50 MG TAB] 90 tablet 0     Sig: TAKE 1 TABLET BY MOUTH DAILY        Last Filled:  2/12/25 #90 0 refill    Patient Phone Number: [phone] (home) [phone] (work)    Last appt: 5/13/2025   Next appt: 8/13/2025    Last OARRS:        No data to display

## 2025-05-21 ENCOUNTER — TELEPHONE (OUTPATIENT)
Dept: PRIMARY CARE CLINIC | Age: 60
End: 2025-05-21

## 2025-05-21 DIAGNOSIS — E10.69 TYPE 1 DIABETES MELLITUS WITH OTHER SPECIFIED COMPLICATION (HCC): Primary | ICD-10-CM

## 2025-05-21 RX ORDER — ACYCLOVIR 400 MG/1
1 TABLET ORAL
Qty: 1 EACH | Refills: 0 | Status: SHIPPED | OUTPATIENT
Start: 2025-05-21

## 2025-05-21 RX ORDER — ACYCLOVIR 400 MG/1
1 TABLET ORAL
Qty: 1 EACH | Refills: 3 | Status: SHIPPED | OUTPATIENT
Start: 2025-05-21

## 2025-05-21 NOTE — TELEPHONE ENCOUNTER
Judy called needing directions for the  Dexcom G6 Sensor that you requested for the patient. [phone]

## 2025-06-13 ENCOUNTER — TELEPHONE (OUTPATIENT)
Dept: PRIMARY CARE CLINIC | Age: 60
End: 2025-06-13

## 2025-06-24 DIAGNOSIS — I25.10 CORONARY ARTERY DISEASE INVOLVING NATIVE CORONARY ARTERY OF NATIVE HEART WITHOUT ANGINA PECTORIS: ICD-10-CM

## 2025-06-24 RX ORDER — AMLODIPINE BESYLATE 5 MG/1
5 TABLET ORAL DAILY
Qty: 30 TABLET | Refills: 2 | Status: SHIPPED | OUTPATIENT
Start: 2025-06-24

## 2025-06-24 NOTE — TELEPHONE ENCOUNTER
Medication:   Requested Prescriptions     Pending Prescriptions Disp Refills    amLODIPine (NORVASC) 5 MG tablet [Pharmacy Med Name: amLODIPine BESYLATE 5 MG TAB] 30 tablet 2     Sig: TAKE 1 TABLET BY MOUTH DAILY        Last Filled:  02/12/25 #30 tablet 2 refill    Patient Phone Number: [phone] (home) [phone] (work)    Last appt: 5/13/2025   Next appt: 8/13/2025    Last OARRS:        No data to display

## 2025-08-04 ENCOUNTER — TELEPHONE (OUTPATIENT)
Dept: PRIMARY CARE CLINIC | Age: 60
End: 2025-08-04